# Patient Record
Sex: MALE | Race: WHITE | Employment: FULL TIME | URBAN - METROPOLITAN AREA
[De-identification: names, ages, dates, MRNs, and addresses within clinical notes are randomized per-mention and may not be internally consistent; named-entity substitution may affect disease eponyms.]

---

## 2017-05-01 PROBLEM — M25.561 PAIN OF RIGHT KNEE AND LOWER LEG: Status: ACTIVE | Noted: 2017-05-01

## 2017-05-01 PROBLEM — R22.30 MASS OF SKIN OF SHOULDER: Status: ACTIVE | Noted: 2017-05-01

## 2017-05-01 PROBLEM — L98.9 BACK SKIN LESION: Status: ACTIVE | Noted: 2017-05-01

## 2017-05-01 PROBLEM — M79.661 PAIN OF RIGHT KNEE AND LOWER LEG: Status: ACTIVE | Noted: 2017-05-01

## 2017-05-01 PROBLEM — N40.0 BENIGN NON-NODULAR PROSTATIC HYPERPLASIA WITHOUT LOWER URINARY TRACT SYMPTOMS: Status: ACTIVE | Noted: 2017-05-01

## 2017-06-16 PROBLEM — I10 ESSENTIAL HYPERTENSION: Status: ACTIVE | Noted: 2017-06-16

## 2017-06-16 PROBLEM — E78.2 MIXED HYPERLIPIDEMIA: Status: ACTIVE | Noted: 2017-06-16

## 2018-03-21 DIAGNOSIS — I10 ESSENTIAL HYPERTENSION: ICD-10-CM

## 2018-03-21 RX ORDER — ATENOLOL 25 MG/1
TABLET ORAL
Qty: 90 TABLET | Refills: 3 | Status: SHIPPED | OUTPATIENT
Start: 2018-03-21 | End: 2018-03-22 | Stop reason: SDUPTHER

## 2018-03-22 DIAGNOSIS — I10 ESSENTIAL HYPERTENSION: ICD-10-CM

## 2018-03-23 RX ORDER — ATENOLOL 25 MG/1
TABLET ORAL
Qty: 90 TABLET | Refills: 3 | Status: SHIPPED | OUTPATIENT
Start: 2018-03-23 | End: 2018-10-12 | Stop reason: SDUPTHER

## 2018-07-20 ENCOUNTER — OFFICE VISIT (OUTPATIENT)
Dept: FAMILY MEDICINE CLINIC | Age: 58
End: 2018-07-20
Payer: COMMERCIAL

## 2018-07-20 VITALS
HEIGHT: 68 IN | TEMPERATURE: 98.2 F | OXYGEN SATURATION: 98 % | BODY MASS INDEX: 31.14 KG/M2 | SYSTOLIC BLOOD PRESSURE: 118 MMHG | RESPIRATION RATE: 19 BRPM | HEART RATE: 77 BPM | DIASTOLIC BLOOD PRESSURE: 72 MMHG | WEIGHT: 205.5 LBS

## 2018-07-20 DIAGNOSIS — E78.2 MIXED HYPERLIPIDEMIA: ICD-10-CM

## 2018-07-20 DIAGNOSIS — I10 ESSENTIAL HYPERTENSION: ICD-10-CM

## 2018-07-20 DIAGNOSIS — Z13.31 DEPRESSION SCREEN: ICD-10-CM

## 2018-07-20 DIAGNOSIS — M99.03 SOMATIC DYSFUNCTION OF SPINE, LUMBAR: ICD-10-CM

## 2018-07-20 DIAGNOSIS — E11.9 TYPE 2 DIABETES MELLITUS WITHOUT COMPLICATION, WITHOUT LONG-TERM CURRENT USE OF INSULIN (HCC): ICD-10-CM

## 2018-07-20 DIAGNOSIS — E11.9 TYPE 2 DIABETES MELLITUS WITHOUT COMPLICATION, WITHOUT LONG-TERM CURRENT USE OF INSULIN (HCC): Primary | ICD-10-CM

## 2018-07-20 LAB
GLUCOSE BLD-MCNC: 173 MG/DL
HBA1C MFR BLD: 7.9 %

## 2018-07-20 PROCEDURE — 99214 OFFICE O/P EST MOD 30 MIN: CPT | Performed by: NURSE PRACTITIONER

## 2018-07-20 PROCEDURE — 82962 GLUCOSE BLOOD TEST: CPT | Performed by: NURSE PRACTITIONER

## 2018-07-20 PROCEDURE — 83036 HEMOGLOBIN GLYCOSYLATED A1C: CPT | Performed by: NURSE PRACTITIONER

## 2018-07-20 RX ORDER — LISINOPRIL AND HYDROCHLOROTHIAZIDE 20; 12.5 MG/1; MG/1
1 TABLET ORAL DAILY
Qty: 90 TABLET | Refills: 3 | Status: SHIPPED | OUTPATIENT
Start: 2018-07-20 | End: 2018-10-12 | Stop reason: SDUPTHER

## 2018-07-20 RX ORDER — ATORVASTATIN CALCIUM 40 MG/1
40 TABLET, FILM COATED ORAL DAILY
Qty: 90 TABLET | Refills: 1 | Status: SHIPPED | OUTPATIENT
Start: 2018-07-20 | End: 2018-10-12 | Stop reason: SDUPTHER

## 2018-07-20 ASSESSMENT — ENCOUNTER SYMPTOMS
ABDOMINAL PAIN: 0
SHORTNESS OF BREATH: 0
RHINORRHEA: 0
DIARRHEA: 0
CHEST TIGHTNESS: 0
COUGH: 0
FACIAL SWELLING: 0
COLOR CHANGE: 0
TROUBLE SWALLOWING: 0
BACK PAIN: 0
SINUS PRESSURE: 0
CONSTIPATION: 0
SORE THROAT: 0
SINUS PAIN: 0
WHEEZING: 0
VOMITING: 0
NAUSEA: 1
VOICE CHANGE: 0

## 2018-07-20 ASSESSMENT — PATIENT HEALTH QUESTIONNAIRE - PHQ9
1. LITTLE INTEREST OR PLEASURE IN DOING THINGS: 0
2. FEELING DOWN, DEPRESSED OR HOPELESS: 0
SUM OF ALL RESPONSES TO PHQ9 QUESTIONS 1 & 2: 0
SUM OF ALL RESPONSES TO PHQ QUESTIONS 1-9: 0

## 2018-07-20 NOTE — PROGRESS NOTES
OFFICE PROGRESS NOTE  [address] [facility] PRIMARY CARE  [address] [facility] [address]  Dept: [phone]   Chief Complaint   Patient presents with    6 Month Follow-Up    Diabetes     A1C: 7.9    Health Maintenance     dm eye exam, depression screening complete - negative         HPI:     62 y.o. male presents today for follow-up of diabetes [facility]. In-office bloodsugar is:   Results for POC orders placed in visit on 07/20/18   POCT glycosylated hemoglobin (Hb A1C)   Result Value Ref Range    Hemoglobin A1C 7.9 %   POCT Glucose   Result Value Ref Range    Glucose 173 mg/dL       Patient reports being compliant to low carbohydrate diet and increasing weekly exercises. Currently, the patient is treated with medication(s): metformin 500 mg 2 in am and one at dinner. Patient reports checking home blood sugar 3 times per month. Patient states home blood sugar ranges from 120 -180. Patient denies hypoglycemic episodes and understand to take sweetened beverages or a snack if hypoglycemic symptoms are suspected. Hyperlipidemia: Patient presents with hyperlipidemia. He was tested because DM, HTN. His last labs showed Total cholesterol of 225, HDL 46, ,  Triglycerides 223. Denies chest pain, dyspnea, exertional chest pressure/discomfort, fatigue, feeding intolerance, lower extremity edema, palpitations, poor exercise tolerance, syncope, tachypnea and skin xanthelasma. There is a family history of hyperlipidemia. There is not a family history of early ischemia heart disease.   Lab Results   Component Value Date    CHOL 225 (H) 01/21/2018    CHOL 157 08/25/2017    CHOL 164 01/04/2017     Lab Results   Component Value Date    TRIG 223 (H) 01/21/2018    TRIG 243 (H) 08/25/2017    TRIG 289 (H) 01/04/2017     Lab Results   Component Value Date    HDL 46 01/21/2018    HDL 46 08/25/2017    HDL 42 01/04/2017     Lab Results   Component Value Date    LDLCALC 134 (H) 2018    LDLCALC 62 2017    LDLCALC 64 2017     Lab Results   Component Value Date    LABVLDL 45 2018    LABVLDL 49 2017    LABVLDL 58 2017         Hypertension: Patient here for follow-up of elevated blood pressure. He is not exercising and is adherent to low salt diet. Blood pressure is well controlled at home. Cardiac symptoms none. Patient denies chest pain, chest pressure/discomfort, claudication, dyspnea, exertional chest pressure/discomfort, fatigue, irregular heart beat, lower extremity edema, near-syncope, orthopnea, palpitations, paroxysmal nocturnal dyspnea, syncope and tachypnea. Cardiovascular risk factors: diabetes mellitus, dyslipidemia, hypertension, male gender, obesity (BMI >= 30 kg/m2) and sedentary lifestyle. Use of agents associated with hypertension: NSAIDS. History of target organ damage: none. He has been having intermittent nausea in the mornings but not every day, occurs around 7:30 in the morning after he has banana and granola bar with Advil. He has also been sweating more due to the heat, he does drink a lot of water, has green tea. with ginseng and honey, he has also been taking his BP med at the same time. He has been having problems with his right knee and right hip about a month and half, it occurs intermittently and can be daily, will feel like a sharp pain radiates down the leg, It comes from the inner hip down the side of the leg and to the back of the calf, will last about 4 - 5 minutes before he can stand up on it,to the point the leg will give out. He has fibromyalgia as well. He has history of Low back problems at L4-L5 was WC but  as he had not seen anyone in a long time.      Today's vital signs are as follows:  /72 (Site: Right Arm, Position: Sitting, Cuff Size: Large Adult)   Pulse 77   Temp 98.2 °F (36.8 °C) (Temporal)   Resp 19   Ht 5' 8\" (1.727 m)   Wt 205 lb 8 oz (93.2 kg)   SpO2 98%   BMI 31.25 kg/m²     Lab Results   Component Value Date    LABA1C 7.9 07/20/2018     Pain scale: 0/10      Current Outpatient Prescriptions:     atorvastatin (LIPITOR) 40 MG tablet, Take 1 tablet by mouth daily, Disp: 90 tablet, Rfl: 1    lisinopril-hydrochlorothiazide (PRINZIDE;ZESTORETIC) 20-12.5 MG per tablet, Take 1 tablet by mouth daily, Disp: 90 tablet, Rfl: 3    atenolol (TENORMIN) 25 MG tablet, TAKE ONE TABLET BY MOUTH ONCE A DAY., Disp: 90 tablet, Rfl: 3    metFORMIN (GLUCOPHAGE) 500 MG tablet, TAKE ONE TABLET BY MOUTH THREE TIMES A DAY BEFORE MEALS, Disp: 270 tablet, Rfl: 1    fluticasone (FLONASE) 50 MCG/ACT nasal spray, 1 spray by Nasal route daily, Disp: 3 Bottle, Rfl: 3    Blood Glucose Monitoring Suppl (ACURA BLOOD GLUCOSE METER) W/DEVICE KIT, 1 Device by Does not apply route daily, Disp: 1 kit, Rfl: 0    glucose blood VI test strips (EXACTECH TEST) strip, 1 each by In Vitro route daily As needed. , Disp: 100 each, Rfl: 3    vitamin E 400 UNIT capsule, Take 400 Units by mouth 2 times daily, Disp: , Rfl:     famotidine (PEPCID) 20 MG tablet, Take 20 mg by mouth daily. , Disp: , Rfl:     Cholecalciferol (VITAMIN D3) 2000 UNITS CAPS, Take 1 capsule by mouth., Disp: , Rfl:     Multiple Vitamin (MULTI-VITAMIN) TABS, Take 1 tablet by mouth Daily. , Disp: , Rfl:     naproxen (NAPROSYN) 500 MG tablet, Take 1 tablet by mouth 2 times daily for 30 doses, Disp: 30 tablet, Rfl: 0  Social History     Social History    Marital status:      Spouse name: N/A    Number of children: N/A    Years of education: N/A     Social History Main Topics    Smoking status: Former Smoker     Packs/day: 1.50     Years: 25.00     Quit date: 2/16/2002    Smokeless tobacco: Never Used    Alcohol use Yes      Comment: Social     Drug use: No    Sexual activity: Not Asked     Other Topics Concern    None     Social History Narrative    None       I have reviewed Rohan's allergies, medications, problem list, medical, social and family add medication.   -     POCT glycosylated hemoglobin (Hb A1C) 7.8% up from 7%  -     POCT Glucose  -     Lipid Panel; Future  -     Comprehensive Metabolic Panel; Future  Monitor BS at different times: 1 day fasting, then next day 2 hours after lunch, next day 2 hours after dinner, next day at bedtime then start over and log all values. Bring log to next appointment  Foot exam every day; wash and dry well between toes, look for any redness, cracks, wounds notify provider if any problems occur  Reminder for annual Eye exam  Reminder for Podiatry visits Q 2 Months for toenail care if needed  Reminder to keep vaccines up dated  Exercise 30 minutes daily  Recommend Diabetic Education Classes if you have not already attended    Mixed hyperlipidemia uncontrolled  -     atorvastatin (LIPITOR) 40 MG tablet; Take 1 tablet by mouth daily  -     Lipid Panel; Future  -Discussed low fat diet, limit fast food, goodies, breads and pastas if consuming several days a week,  limit any alcohol consumption.  -Discussed weight reduction and exercise 30 minutes 5 days a week for total of 150 minutes weekly.  -Discussed if any unusual muscle aching/pain to contact the office, discussed medication and risk of muscle pain/damage from Rhabdomyolysis. Essential hypertension stable  -     lisinopril-hydrochlorothiazide (PRINZIDE;ZESTORETIC) 20-12.5 MG per tablet; Take 1 tablet by mouth daily  -     Comprehensive Metabolic Panel; Future  -Discussed taking medication and directed every day. -Discussed exercising daily 30 minutes 5 times a week for 150 minutes weekly.  -Discussed weight reduction if needed.  -Discussed low sodium diet.  -Discussed limiting caffeine consumption and tobacco cessation and the effects they have on the heart and blood pressure. Depression screen    Somatic dysfunction of spine, lumbar New  Recommend seeing Chiropractor and if still having problems with the hip and knee to see me sooner.                Return in about 3 months (around 10/20/2018), or if symptoms worsen or fail to improve, for DM, HTN, hyperlipidemia. I have reviewed my findings and recommendations with Rene Weiss.     Marj Richard, NP-C, FNP-BC

## 2018-07-20 NOTE — PATIENT INSTRUCTIONS
can use it when you eat out. · Write down your questions about using the plate format. Talk to your doctor, a dietitian, or a diabetes educator about your concerns. Carbohydrate counting  With carbohydrate counting, you plan meals based on the amount of carbohydrate in each food. Carbohydrate raises blood sugar higher and more quickly than any other nutrient. It is found in desserts, breads and cereals, and fruit. It's also found in starchy vegetables such as potatoes and corn, grains such as rice and pasta, and milk and yogurt. Spreading carbohydrate throughout the day helps keep your blood sugar levels within your target range. Your daily amount depends on several things, including your weight, how active you are, which diabetes medicines you take, and what your goals are for your blood sugar levels. A registered dietitian or diabetes educator can help you plan how much carbohydrate to include in each meal and snack. A guideline for your daily amount of carbohydrate is:  · 45 to 60 grams at each meal. That's about the same as 3 to 4 carbohydrate servings. · 15 to 20 grams at each snack. That's about the same as 1 carbohydrate serving. The Nutrition Facts label on packaged foods tells you how much carbohydrate is in a serving of the food. First, look at the serving size on the food label. Is that the amount you eat in a serving? All of the nutrition information on a food label is based on that serving size. So if you eat more or less than that, you'll need to adjust the other numbers. Total carbohydrate is the next thing you need to look for on the label. If you count carbohydrate servings, one serving of carbohydrate is 15 grams. For foods that don't come with labels, such as fresh fruits and vegetables, you'll need a guide that lists carbohydrate in these foods. Ask your doctor, dietitian, or diabetes educator about books or other nutrition guides you can use.   If you take insulin, you need to know how on the \"Sign Up Now\" link. Current as of: December 7, 2017  Content Version: 11.6  © 0121-5934 [employer]. Care instructions adapted under license by [facility] ([facility]). If you have questions about a medical condition or this instruction, always ask your healthcare professional. [address] any warranty or liability for your use of this information. Patient Education        Learning About Diabetes Food Guidelines  Your Care Instructions    Meal planning is important to manage diabetes. It helps keep your blood sugar at a target level (which you set with your doctor). You don't have to eat special foods. You can eat what your family eats, including sweets once in a while. But you do have to pay attention to how often you eat and how much you eat of certain foods. You may want to work with a dietitian or a certified diabetes educator (CDE) to help you plan meals and snacks. A dietitian or CDE can also help you lose weight if that is one of your goals. What should you know about eating carbs? Managing the amount of carbohydrate (carbs) you eat is an important part of healthy meals when you have diabetes. Carbohydrate is found in many foods. · Learn which foods have carbs. And learn the amounts of carbs in different foods. ¨ Bread, cereal, pasta, and rice have about 15 grams of carbs in a serving. A serving is 1 slice of bread (1 ounce), ½ cup of cooked cereal, or 1/3 cup of cooked pasta or rice. ¨ Fruits have 15 grams of carbs in a serving. A serving is 1 small fresh fruit, such as an apple or orange; ½ of a banana; ½ cup of cooked or canned fruit; ½ cup of fruit juice; 1 cup of melon or raspberries; or 2 tablespoons of dried fruit. ¨ Milk and no-sugar-added yogurt have 15 grams of carbs in a serving. A serving is 1 cup of milk or 2/3 cup of no-sugar-added yogurt. ¨ Starchy vegetables have 15 grams of carbs in a serving.  A serving is ½ cup of mashed potatoes or sweet potato; 1 cup winter squash; ½ of a small baked potato; ½ cup of cooked beans; or ½ cup cooked corn or green peas. · Learn how much carbs to eat each day and at each meal. A dietitian or CDE can teach you how to keep track of the amount of carbs you eat. This is called carbohydrate counting. · If you are not sure how to count carbohydrate grams, use the Plate Method to plan meals. It is a good, quick way to make sure that you have a balanced meal. It also helps you spread carbs throughout the day. ¨ Divide your plate by types of foods. Put non-starchy vegetables on half the plate, meat or other protein food on one-quarter of the plate, and a grain or starchy vegetable in the final quarter of the plate. To this you can add a small piece of fruit and 1 cup of milk or yogurt, depending on how many carbs you are supposed to eat at a meal.  · Try to eat about the same amount of carbs at each meal. Do not \"save up\" your daily allowance of carbs to eat at one meal.  · Proteins have very little or no carbs per serving. Examples of proteins are beef, chicken, turkey, fish, eggs, tofu, cheese, cottage cheese, and peanut butter. A serving size of meat is 3 ounces, which is about the size of a deck of cards. Examples of meat substitute serving sizes (equal to 1 ounce of meat) are 1/4 cup of cottage cheese, 1 egg, 1 tablespoon of peanut butter, and ½ cup of tofu. How can you eat out and still eat healthy? · Learn to estimate the serving sizes of foods that have carbohydrate. If you measure food at home, it will be easier to estimate the amount in a serving of restaurant food. · If the meal you order has too much carbohydrate (such as potatoes, corn, or baked beans), ask to have a low-carbohydrate food instead. Ask for a salad or green vegetables. · If you use insulin, check your blood sugar before and after eating out to help you plan how much to eat in the future.   · If you eat more carbohydrate at a meal than you had

## 2018-07-22 ENCOUNTER — HOSPITAL ENCOUNTER (OUTPATIENT)
Age: 58
Discharge: HOME | End: 2018-07-22
Payer: COMMERCIAL

## 2018-07-22 DIAGNOSIS — I10 ESSENTIAL HYPERTENSION: ICD-10-CM

## 2018-07-22 DIAGNOSIS — E78.2 MIXED HYPERLIPIDEMIA: ICD-10-CM

## 2018-07-22 DIAGNOSIS — E11.9 TYPE 2 DIABETES MELLITUS WITHOUT COMPLICATION, WITHOUT LONG-TERM CURRENT USE OF INSULIN (HCC): ICD-10-CM

## 2018-07-22 LAB
ALBUMIN SERPL-MCNC: 4.3 G/DL (ref 3.5–5.2)
ALP BLD-CCNC: 78 U/L (ref 40–129)
ALT SERPL-CCNC: 51 U/L (ref 0–40)
ANION GAP SERPL CALCULATED.3IONS-SCNC: 12 MMOL/L (ref 7–16)
AST SERPL-CCNC: 34 U/L (ref 0–39)
BILIRUB SERPL-MCNC: 0.5 MG/DL (ref 0–1.2)
BUN BLDV-MCNC: 20 MG/DL (ref 6–20)
CALCIUM SERPL-MCNC: 9.3 MG/DL (ref 8.6–10.2)
CHLORIDE BLD-SCNC: 104 MMOL/L (ref 98–107)
CHOLESTEROL, TOTAL: 142 MG/DL (ref 0–199)
CO2: 25 MMOL/L (ref 22–29)
CREAT SERPL-MCNC: 1.2 MG/DL (ref 0.7–1.2)
GFR AFRICAN AMERICAN: >60
GFR NON-AFRICAN AMERICAN: >60 ML/MIN/1.73
GLUCOSE BLD-MCNC: 184 MG/DL (ref 74–109)
HDLC SERPL-MCNC: 41 MG/DL
LDL CHOLESTEROL CALCULATED: 49 MG/DL (ref 0–99)
POTASSIUM SERPL-SCNC: 4.5 MMOL/L (ref 3.5–5)
SODIUM BLD-SCNC: 141 MMOL/L (ref 132–146)
TOTAL PROTEIN: 7 G/DL (ref 6.4–8.3)
TRIGL SERPL-MCNC: 258 MG/DL (ref 0–149)
VLDLC SERPL CALC-MCNC: 52 MG/DL

## 2018-07-22 PROCEDURE — 36415 COLL VENOUS BLD VENIPUNCTURE: CPT

## 2018-07-22 PROCEDURE — 80061 LIPID PANEL: CPT

## 2018-07-22 PROCEDURE — 80053 COMPREHEN METABOLIC PANEL: CPT

## 2018-07-23 ENCOUNTER — TELEPHONE (OUTPATIENT)
Dept: FAMILY MEDICINE CLINIC | Age: 58
End: 2018-07-23

## 2018-07-23 DIAGNOSIS — R74.8 ELEVATED LIVER ENZYMES: Primary | ICD-10-CM

## 2018-07-23 NOTE — TELEPHONE ENCOUNTER
Called and Spoke to Ze Deleon, he expressed understanding regarding lab results and carb/sugar intake due to Tea he has been drinking. He stated he likes to get his labs drawn in [place], he asked if you could order them, he will pick the orders up and go get them done in about a month.

## 2018-08-25 ENCOUNTER — HOSPITAL ENCOUNTER (OUTPATIENT)
Age: 58
Discharge: HOME | End: 2018-08-25
Payer: COMMERCIAL

## 2018-08-25 LAB
ALBUMIN SERPL-MCNC: 4.3 G/DL (ref 3.5–5.2)
ALP BLD-CCNC: 83 U/L (ref 40–129)
ALT SERPL-CCNC: 49 U/L (ref 0–40)
ANION GAP SERPL CALCULATED.3IONS-SCNC: 13 MMOL/L (ref 7–16)
AST SERPL-CCNC: 27 U/L (ref 0–39)
BILIRUB SERPL-MCNC: 0.3 MG/DL (ref 0–1.2)
BUN BLDV-MCNC: 14 MG/DL (ref 6–20)
CALCIUM SERPL-MCNC: 9 MG/DL (ref 8.6–10.2)
CHLORIDE BLD-SCNC: 104 MMOL/L (ref 98–107)
CO2: 24 MMOL/L (ref 22–29)
CREAT SERPL-MCNC: 1.1 MG/DL (ref 0.7–1.2)
GFR AFRICAN AMERICAN: >60
GFR NON-AFRICAN AMERICAN: >60 ML/MIN/1.73
GLUCOSE FASTING: 190 MG/DL (ref 74–109)
POTASSIUM SERPL-SCNC: 4.5 MMOL/L (ref 3.5–5)
SODIUM BLD-SCNC: 141 MMOL/L (ref 132–146)
TOTAL PROTEIN: 6.6 G/DL (ref 6.4–8.3)

## 2018-08-25 PROCEDURE — 80053 COMPREHEN METABOLIC PANEL: CPT

## 2018-08-25 PROCEDURE — 36415 COLL VENOUS BLD VENIPUNCTURE: CPT

## 2018-10-12 ENCOUNTER — OFFICE VISIT (OUTPATIENT)
Dept: FAMILY MEDICINE CLINIC | Age: 58
End: 2018-10-12
Payer: COMMERCIAL

## 2018-10-12 VITALS
DIASTOLIC BLOOD PRESSURE: 68 MMHG | BODY MASS INDEX: 31.07 KG/M2 | SYSTOLIC BLOOD PRESSURE: 120 MMHG | OXYGEN SATURATION: 98 % | TEMPERATURE: 98.1 F | WEIGHT: 205 LBS | HEART RATE: 89 BPM | RESPIRATION RATE: 19 BRPM | HEIGHT: 68 IN

## 2018-10-12 DIAGNOSIS — E11.9 TYPE 2 DIABETES MELLITUS WITHOUT COMPLICATION, WITHOUT LONG-TERM CURRENT USE OF INSULIN (HCC): Primary | ICD-10-CM

## 2018-10-12 DIAGNOSIS — E78.2 MIXED HYPERLIPIDEMIA: ICD-10-CM

## 2018-10-12 DIAGNOSIS — H61.21 IMPACTED CERUMEN OF RIGHT EAR: ICD-10-CM

## 2018-10-12 DIAGNOSIS — I10 ESSENTIAL HYPERTENSION: ICD-10-CM

## 2018-10-12 LAB
CREATININE URINE POCT: NORMAL
GLUCOSE BLD-MCNC: 229 MG/DL
HBA1C MFR BLD: 7.9 %
MICROALBUMIN/CREAT 24H UR: NORMAL MG/G{CREAT}
MICROALBUMIN/CREAT UR-RTO: NORMAL

## 2018-10-12 PROCEDURE — 99214 OFFICE O/P EST MOD 30 MIN: CPT | Performed by: NURSE PRACTITIONER

## 2018-10-12 PROCEDURE — 82962 GLUCOSE BLOOD TEST: CPT | Performed by: NURSE PRACTITIONER

## 2018-10-12 PROCEDURE — 83036 HEMOGLOBIN GLYCOSYLATED A1C: CPT | Performed by: NURSE PRACTITIONER

## 2018-10-12 PROCEDURE — 82044 UR ALBUMIN SEMIQUANTITATIVE: CPT | Performed by: NURSE PRACTITIONER

## 2018-10-12 RX ORDER — LISINOPRIL AND HYDROCHLOROTHIAZIDE 20; 12.5 MG/1; MG/1
1 TABLET ORAL DAILY
Qty: 90 TABLET | Refills: 3 | Status: SHIPPED | OUTPATIENT
Start: 2018-10-12 | End: 2019-05-14 | Stop reason: DRUGHIGH

## 2018-10-12 RX ORDER — ATORVASTATIN CALCIUM 40 MG/1
40 TABLET, FILM COATED ORAL DAILY
Qty: 90 TABLET | Refills: 1 | Status: SHIPPED | OUTPATIENT
Start: 2018-10-12 | End: 2019-05-14 | Stop reason: SDUPTHER

## 2018-10-12 RX ORDER — ATENOLOL 25 MG/1
TABLET ORAL
Qty: 90 TABLET | Refills: 3 | Status: SHIPPED | OUTPATIENT
Start: 2018-10-12 | End: 2019-05-14 | Stop reason: SDUPTHER

## 2018-10-12 ASSESSMENT — ENCOUNTER SYMPTOMS
COLOR CHANGE: 0
SHORTNESS OF BREATH: 0
SINUS PAIN: 0
BACK PAIN: 0
WHEEZING: 0
VOMITING: 0
RHINORRHEA: 0
SINUS PRESSURE: 0
FACIAL SWELLING: 0
ABDOMINAL PAIN: 0
COUGH: 0
NAUSEA: 0
CHEST TIGHTNESS: 0
DIARRHEA: 0
SORE THROAT: 0
CONSTIPATION: 0
VOICE CHANGE: 0
TROUBLE SWALLOWING: 0

## 2018-10-12 NOTE — PROGRESS NOTES
claudication, dyspnea, exertional chest pressure/discomfort, fatigue, irregular heart beat, lower extremity edema, near-syncope, orthopnea, palpitations, paroxysmal nocturnal dyspnea, syncope and tachypnea. Cardiovascular risk factors: advanced age (older than 54 for men, 72 for women), diabetes mellitus, dyslipidemia, hypertension, male gender and obesity (BMI >= 30 kg/m2). Use of agents associated with hypertension: none. History of target organ damage: none. Hyperlipidemia: Patient presents with hyperlipidemia. He was tested because DM, HTN, Hyperlipidemia. His last labs showed Total cholesterol of 142, HDL 41, LDL 49,  Triglycerides 258. Denies chest pain, dyspnea, exertional chest pressure/discomfort, fatigue, feeding intolerance, lower extremity edema, palpitations, poor exercise tolerance, syncope, tachypnea and skin xanthelasma. There is a family history of hyperlipidemia. There is not a family history of early ischemia heart disease.   Lab Results   Component Value Date    CHOL 142 07/22/2018    CHOL 225 (H) 01/21/2018    CHOL 157 08/25/2017     Lab Results   Component Value Date    TRIG 258 (H) 07/22/2018    TRIG 223 (H) 01/21/2018    TRIG 243 (H) 08/25/2017     Lab Results   Component Value Date    HDL 41 07/22/2018    HDL 46 01/21/2018    HDL 46 08/25/2017     Lab Results   Component Value Date    LDLCALC 49 07/22/2018    LDLCALC 134 (H) 01/21/2018    LDLCALC 62 08/25/2017     Lab Results   Component Value Date    LABVLDL 52 07/22/2018    LABVLDL 45 01/21/2018    LABVLDL 49 08/25/2017             Current Outpatient Prescriptions:     atenolol (TENORMIN) 25 MG tablet, TAKE ONE TABLET BY MOUTH ONCE A DAY., Disp: 90 tablet, Rfl: 3    atorvastatin (LIPITOR) 40 MG tablet, Take 1 tablet by mouth daily, Disp: 90 tablet, Rfl: 1    lisinopril-hydrochlorothiazide (PRINZIDE;ZESTORETIC) 20-12.5 MG per tablet, Take 1 tablet by mouth daily, Disp: 90 tablet, Rfl: 3    dapagliflozin (FARXIGA) 5 MG tablet, Take any redness, cracks, wounds notify provider if any problems occur  Reminder for annual Eye exam  Reminder for Podiatry visits Q 2 Months for toenail care if needed  Reminder to keep vaccines up dated  Exercise 30 minutes daily  Recommend Diabetic Education Classes if you have not already attended    Essential hypertension stable  -     atenolol (TENORMIN) 25 MG tablet; TAKE ONE TABLET BY MOUTH ONCE A DAY. -     lisinopril-hydrochlorothiazide (PRINZIDE;ZESTORETIC) 20-12.5 MG per tablet; Take 1 tablet by mouth daily  -Discussed taking medication and directed every day. -Discussed exercising daily 30 minutes 5 times a week for 150 minutes weekly.  -Discussed weight reduction if needed.  -Discussed low sodium diet.  -Discussed limiting caffeine consumption and tobacco cessation and the effects they have on the heart and blood pressure. Mixed hyperlipidemia not at goal  -     atorvastatin (LIPITOR) 40 MG tablet; Take 1 tablet by mouth daily  -Discussed low fat diet, limit fast food, goodies, breads and pastas if consuming several days a week,  limit any alcohol consumption.  -Discussed weight reduction and exercise 30 minutes 5 days a week for total of 150 minutes weekly.  -Discussed if any unusual muscle aching/pain to contact the office, discussed medication and risk of muscle pain/damage from Rhabdomyolysis. -Discussed repeat labs in 8 weeks. Impacted cerumen of right ear new  Debrox to the ear daily x 7 days, irrigate with warm water if not improved will refer to ENT          Return in about 1 month (around 11/12/2018) for DM med adjustment. Discussed weight loss, Discussed exercising 30 minutes daily and Discussed taking medications as directed and adverse effects        I have reviewed my findings and recommendations with Yung Faith.     James Ortega, NP-C, FNP-BC

## 2018-10-12 NOTE — PATIENT INSTRUCTIONS
for, the expertise, skill, knowledge and judgment of healthcare practitioners. The absence of a warning for a given drug or drug combination in no way should be construed to indicate that the drug or drug combination is safe, effective or appropriate for any given patient. [facility] does not assume any responsibility for any aspect of healthcare administered with the aid of information [facility] provides. The information contained herein is not intended to cover all possible uses, directions, precautions, warnings, drug interactions, allergic reactions, or adverse effects. If you have questions about the drugs you are taking, check with your doctor, nurse or pharmacist.  Copyright 8834-2977 [address]. Version: 4.01. Revision date: 1/15/2018. Care instructions adapted under license by [facility] ([facility]). If you have questions about a medical condition or this instruction, always ask your healthcare professional. [place] [address] any warranty or liability for your use of this information.      Debrox to the ear daily x 7 days, irrigate with warm water if not improved will refer to ENT

## 2018-11-09 ENCOUNTER — OFFICE VISIT (OUTPATIENT)
Dept: FAMILY MEDICINE CLINIC | Age: 58
End: 2018-11-09
Payer: COMMERCIAL

## 2018-11-09 VITALS
BODY MASS INDEX: 30.16 KG/M2 | HEART RATE: 77 BPM | TEMPERATURE: 98.4 F | OXYGEN SATURATION: 94 % | RESPIRATION RATE: 19 BRPM | HEIGHT: 68 IN | DIASTOLIC BLOOD PRESSURE: 66 MMHG | SYSTOLIC BLOOD PRESSURE: 112 MMHG | WEIGHT: 199 LBS

## 2018-11-09 DIAGNOSIS — I10 ESSENTIAL HYPERTENSION: ICD-10-CM

## 2018-11-09 DIAGNOSIS — E11.9 TYPE 2 DIABETES MELLITUS WITHOUT COMPLICATION, WITHOUT LONG-TERM CURRENT USE OF INSULIN (HCC): Primary | ICD-10-CM

## 2018-11-09 DIAGNOSIS — N40.0 BENIGN NON-NODULAR PROSTATIC HYPERPLASIA WITHOUT LOWER URINARY TRACT SYMPTOMS: ICD-10-CM

## 2018-11-09 DIAGNOSIS — E78.2 MIXED HYPERLIPIDEMIA: ICD-10-CM

## 2018-11-09 LAB — GLUCOSE BLD-MCNC: 126 MG/DL

## 2018-11-09 PROCEDURE — 99213 OFFICE O/P EST LOW 20 MIN: CPT | Performed by: NURSE PRACTITIONER

## 2018-11-09 PROCEDURE — 82962 GLUCOSE BLOOD TEST: CPT | Performed by: NURSE PRACTITIONER

## 2018-11-09 ASSESSMENT — ENCOUNTER SYMPTOMS
COLOR CHANGE: 0
COUGH: 0
SHORTNESS OF BREATH: 0

## 2019-02-10 ENCOUNTER — HOSPITAL ENCOUNTER (OUTPATIENT)
Age: 59
Discharge: HOME | End: 2019-02-10
Payer: COMMERCIAL

## 2019-02-10 DIAGNOSIS — I10 ESSENTIAL HYPERTENSION: ICD-10-CM

## 2019-02-10 DIAGNOSIS — E78.2 MIXED HYPERLIPIDEMIA: ICD-10-CM

## 2019-02-10 DIAGNOSIS — N40.0 BENIGN NON-NODULAR PROSTATIC HYPERPLASIA WITHOUT LOWER URINARY TRACT SYMPTOMS: ICD-10-CM

## 2019-02-10 DIAGNOSIS — E11.9 TYPE 2 DIABETES MELLITUS WITHOUT COMPLICATION, WITHOUT LONG-TERM CURRENT USE OF INSULIN (HCC): ICD-10-CM

## 2019-02-10 LAB
ALBUMIN SERPL-MCNC: 4.1 G/DL (ref 3.5–5.2)
ALP BLD-CCNC: 94 U/L (ref 40–129)
ALT SERPL-CCNC: 31 U/L (ref 0–40)
ANION GAP SERPL CALCULATED.3IONS-SCNC: 11 MMOL/L (ref 7–16)
AST SERPL-CCNC: 19 U/L (ref 0–39)
BASOPHILS ABSOLUTE: 0.09 E9/L (ref 0–0.2)
BASOPHILS RELATIVE PERCENT: 1.3 % (ref 0–2)
BILIRUB SERPL-MCNC: 0.4 MG/DL (ref 0–1.2)
BUN BLDV-MCNC: 23 MG/DL (ref 6–20)
CALCIUM SERPL-MCNC: 9.1 MG/DL (ref 8.6–10.2)
CHLORIDE BLD-SCNC: 102 MMOL/L (ref 98–107)
CHOLESTEROL, TOTAL: 134 MG/DL (ref 0–199)
CO2: 25 MMOL/L (ref 22–29)
CREAT SERPL-MCNC: 1.3 MG/DL (ref 0.7–1.2)
EOSINOPHILS ABSOLUTE: 0.26 E9/L (ref 0.05–0.5)
EOSINOPHILS RELATIVE PERCENT: 3.9 % (ref 0–6)
GFR AFRICAN AMERICAN: >60
GFR NON-AFRICAN AMERICAN: 57 ML/MIN/1.73
GLUCOSE BLD-MCNC: 195 MG/DL (ref 74–99)
HBA1C MFR BLD: 8 % (ref 4–5.6)
HCT VFR BLD CALC: 42.1 % (ref 37–54)
HDLC SERPL-MCNC: 45 MG/DL
HEMOGLOBIN: 14.8 G/DL (ref 12.5–16.5)
IMMATURE GRANULOCYTES #: 0.02 E9/L
IMMATURE GRANULOCYTES %: 0.3 % (ref 0–5)
LDL CHOLESTEROL CALCULATED: 60 MG/DL (ref 0–99)
LYMPHOCYTES ABSOLUTE: 1.75 E9/L (ref 1.5–4)
LYMPHOCYTES RELATIVE PERCENT: 26.2 % (ref 20–42)
MCH RBC QN AUTO: 30.9 PG (ref 26–35)
MCHC RBC AUTO-ENTMCNC: 35.2 % (ref 32–34.5)
MCV RBC AUTO: 87.9 FL (ref 80–99.9)
MONOCYTES ABSOLUTE: 0.65 E9/L (ref 0.1–0.95)
MONOCYTES RELATIVE PERCENT: 9.7 % (ref 2–12)
NEUTROPHILS ABSOLUTE: 3.91 E9/L (ref 1.8–7.3)
NEUTROPHILS RELATIVE PERCENT: 58.6 % (ref 43–80)
PDW BLD-RTO: 11.5 FL (ref 11.5–15)
PLATELET # BLD: 223 E9/L (ref 130–450)
PMV BLD AUTO: 12 FL (ref 7–12)
POTASSIUM SERPL-SCNC: 4.6 MMOL/L (ref 3.5–5)
PROSTATE SPECIFIC ANTIGEN: 0.52 NG/ML (ref 0–4)
RBC # BLD: 4.79 E12/L (ref 3.8–5.8)
SODIUM BLD-SCNC: 138 MMOL/L (ref 132–146)
TOTAL PROTEIN: 6.9 G/DL (ref 6.4–8.3)
TRIGL SERPL-MCNC: 147 MG/DL (ref 0–149)
VLDLC SERPL CALC-MCNC: 29 MG/DL
WBC # BLD: 6.7 E9/L (ref 4.5–11.5)

## 2019-02-10 PROCEDURE — 83036 HEMOGLOBIN GLYCOSYLATED A1C: CPT

## 2019-02-10 PROCEDURE — G0103 PSA SCREENING: HCPCS

## 2019-02-10 PROCEDURE — 80053 COMPREHEN METABOLIC PANEL: CPT

## 2019-02-10 PROCEDURE — 85025 COMPLETE CBC W/AUTO DIFF WBC: CPT

## 2019-02-10 PROCEDURE — 80061 LIPID PANEL: CPT

## 2019-02-10 PROCEDURE — 36415 COLL VENOUS BLD VENIPUNCTURE: CPT

## 2019-02-14 ENCOUNTER — OFFICE VISIT (OUTPATIENT)
Dept: FAMILY MEDICINE CLINIC | Age: 59
End: 2019-02-14
Payer: COMMERCIAL

## 2019-02-14 VITALS
DIASTOLIC BLOOD PRESSURE: 68 MMHG | RESPIRATION RATE: 16 BRPM | HEART RATE: 77 BPM | WEIGHT: 200.8 LBS | SYSTOLIC BLOOD PRESSURE: 124 MMHG | TEMPERATURE: 98.8 F | BODY MASS INDEX: 31.52 KG/M2 | OXYGEN SATURATION: 97 % | HEIGHT: 67 IN

## 2019-02-14 DIAGNOSIS — E11.9 TYPE 2 DIABETES MELLITUS WITHOUT COMPLICATION, WITHOUT LONG-TERM CURRENT USE OF INSULIN (HCC): ICD-10-CM

## 2019-02-14 DIAGNOSIS — L24.9 IRRITANT CONTACT DERMATITIS, UNSPECIFIED TRIGGER: Primary | ICD-10-CM

## 2019-02-14 DIAGNOSIS — E78.2 MIXED HYPERLIPIDEMIA: ICD-10-CM

## 2019-02-14 DIAGNOSIS — I10 ESSENTIAL HYPERTENSION: ICD-10-CM

## 2019-02-14 PROCEDURE — 99214 OFFICE O/P EST MOD 30 MIN: CPT | Performed by: NURSE PRACTITIONER

## 2019-02-14 ASSESSMENT — ENCOUNTER SYMPTOMS
COUGH: 0
FACIAL SWELLING: 0
COLOR CHANGE: 0
SHORTNESS OF BREATH: 0
RHINORRHEA: 0
SORE THROAT: 0
CONSTIPATION: 0
CHEST TIGHTNESS: 0
VOICE CHANGE: 0
BACK PAIN: 0
DIARRHEA: 0
NAUSEA: 0
WHEEZING: 0
VOMITING: 0
ABDOMINAL PAIN: 0
SINUS PAIN: 0
TROUBLE SWALLOWING: 0
SINUS PRESSURE: 0

## 2019-02-14 ASSESSMENT — PATIENT HEALTH QUESTIONNAIRE - PHQ9
1. LITTLE INTEREST OR PLEASURE IN DOING THINGS: 0
SUM OF ALL RESPONSES TO PHQ QUESTIONS 1-9: 0
2. FEELING DOWN, DEPRESSED OR HOPELESS: 0
SUM OF ALL RESPONSES TO PHQ QUESTIONS 1-9: 0
SUM OF ALL RESPONSES TO PHQ9 QUESTIONS 1 & 2: 0

## 2019-05-07 ENCOUNTER — TELEPHONE (OUTPATIENT)
Dept: ADMINISTRATIVE | Age: 59
End: 2019-05-07

## 2019-05-07 NOTE — TELEPHONE ENCOUNTER
Pt called to schedule an appt, while on the phone pt stated that he has not heard back about getting an Rx for Jardiance filled, it has been a couple of weeks and he was wondering if this can be done.   Pt is coming in for refills and a DM check on 5/14

## 2019-05-07 NOTE — TELEPHONE ENCOUNTER
Patient was to return in 1 month (3/19/2019) to see if medication was working before more was called in. Patient did not come in.   Will discuss with provider at visit

## 2019-05-14 ENCOUNTER — HOSPITAL ENCOUNTER (OUTPATIENT)
Age: 59
Discharge: HOME | End: 2019-05-16
Payer: COMMERCIAL

## 2019-05-14 ENCOUNTER — OFFICE VISIT (OUTPATIENT)
Dept: FAMILY MEDICINE CLINIC | Age: 59
End: 2019-05-14
Payer: COMMERCIAL

## 2019-05-14 VITALS
WEIGHT: 202 LBS | BODY MASS INDEX: 31.71 KG/M2 | HEART RATE: 69 BPM | HEIGHT: 67 IN | RESPIRATION RATE: 16 BRPM | DIASTOLIC BLOOD PRESSURE: 74 MMHG | TEMPERATURE: 97.5 F | OXYGEN SATURATION: 98 % | SYSTOLIC BLOOD PRESSURE: 118 MMHG

## 2019-05-14 DIAGNOSIS — E11.9 TYPE 2 DIABETES MELLITUS WITHOUT COMPLICATION, WITHOUT LONG-TERM CURRENT USE OF INSULIN (HCC): Primary | ICD-10-CM

## 2019-05-14 DIAGNOSIS — E11.9 TYPE 2 DIABETES MELLITUS WITHOUT COMPLICATION, WITHOUT LONG-TERM CURRENT USE OF INSULIN (HCC): ICD-10-CM

## 2019-05-14 DIAGNOSIS — I10 ESSENTIAL HYPERTENSION: ICD-10-CM

## 2019-05-14 DIAGNOSIS — E78.2 MIXED HYPERLIPIDEMIA: ICD-10-CM

## 2019-05-14 LAB
ALBUMIN SERPL-MCNC: 4.3 G/DL (ref 3.5–5.2)
ALP BLD-CCNC: 88 U/L (ref 40–129)
ALT SERPL-CCNC: 28 U/L (ref 0–40)
ANION GAP SERPL CALCULATED.3IONS-SCNC: 14 MMOL/L (ref 7–16)
AST SERPL-CCNC: 17 U/L (ref 0–39)
BILIRUB SERPL-MCNC: 0.2 MG/DL (ref 0–1.2)
BUN BLDV-MCNC: 16 MG/DL (ref 6–20)
CALCIUM SERPL-MCNC: 9.6 MG/DL (ref 8.6–10.2)
CHLORIDE BLD-SCNC: 103 MMOL/L (ref 98–107)
CO2: 26 MMOL/L (ref 22–29)
CREAT SERPL-MCNC: 1.3 MG/DL (ref 0.7–1.2)
GFR AFRICAN AMERICAN: >60
GFR NON-AFRICAN AMERICAN: 56 ML/MIN/1.73
GLUCOSE BLD-MCNC: 150 MG/DL (ref 74–99)
HBA1C MFR BLD: 7.5 %
POTASSIUM SERPL-SCNC: 4.2 MMOL/L (ref 3.5–5)
SODIUM BLD-SCNC: 143 MMOL/L (ref 132–146)
TOTAL PROTEIN: 7 G/DL (ref 6.4–8.3)

## 2019-05-14 PROCEDURE — 83036 HEMOGLOBIN GLYCOSYLATED A1C: CPT | Performed by: NURSE PRACTITIONER

## 2019-05-14 PROCEDURE — 80053 COMPREHEN METABOLIC PANEL: CPT

## 2019-05-14 PROCEDURE — 99213 OFFICE O/P EST LOW 20 MIN: CPT | Performed by: NURSE PRACTITIONER

## 2019-05-14 PROCEDURE — 36415 COLL VENOUS BLD VENIPUNCTURE: CPT

## 2019-05-14 RX ORDER — ATORVASTATIN CALCIUM 40 MG/1
40 TABLET, FILM COATED ORAL DAILY
Qty: 30 TABLET | Refills: 5 | Status: SHIPPED | OUTPATIENT
Start: 2019-05-14 | End: 2019-08-15 | Stop reason: SDUPTHER

## 2019-05-14 RX ORDER — LISINOPRIL 20 MG/1
20 TABLET ORAL DAILY
Qty: 30 TABLET | Refills: 5 | Status: SHIPPED | OUTPATIENT
Start: 2019-05-14 | End: 2019-08-15

## 2019-05-14 RX ORDER — ATENOLOL 25 MG/1
TABLET ORAL
Qty: 30 TABLET | Refills: 5 | Status: SHIPPED | OUTPATIENT
Start: 2019-05-14 | End: 2019-08-15 | Stop reason: SDUPTHER

## 2019-05-14 RX ORDER — LISINOPRIL AND HYDROCHLOROTHIAZIDE 20; 12.5 MG/1; MG/1
1 TABLET ORAL DAILY
Qty: 30 TABLET | Refills: 3 | Status: CANCELLED | OUTPATIENT
Start: 2019-05-14

## 2019-05-14 ASSESSMENT — ENCOUNTER SYMPTOMS
RHINORRHEA: 0
COUGH: 0
ABDOMINAL PAIN: 0
BACK PAIN: 0
CONSTIPATION: 0
VOICE CHANGE: 0
WHEEZING: 0
VOMITING: 0
NAUSEA: 0
SINUS PAIN: 0
CHEST TIGHTNESS: 0
TROUBLE SWALLOWING: 0
DIARRHEA: 0
SINUS PRESSURE: 0
SHORTNESS OF BREATH: 0
SORE THROAT: 0
FACIAL SWELLING: 0
COLOR CHANGE: 0

## 2019-05-14 NOTE — PROGRESS NOTES
OFFICE PROGRESS NOTE  [address] [facility] [address]  Dept: [phone]   Chief Complaint   Patient presents with    Diabetes     a1c         HPI:     61 y.o. male presents today for follow-up of diabetes [place]. In-office bloodsugar is:   Results for POC orders placed in visit on 05/14/19   POCT glycosylated hemoglobin (Hb A1C)   Result Value Ref Range    Hemoglobin A1C 7.5 %       Patient reports being compliant to low carbohydrate diet and increasing weekly exercises. Currently, the patient is treated with medication(s): Metformin 1000 mg BID, jardiance 10 mg daily, . Patient reports checking home blood sugar 1 times per day. Patient states home blood sugar ranges from 112 - 129. Patient denies hypoglycemic episodes and understand to take sweetened beverages or a snack if hypoglycemic symptoms are suspected. Due for cmp today    .     Today's vital signs are as follows:  /74 (Site: Left Upper Arm, Position: Sitting, Cuff Size: Large Adult)   Pulse 69   Temp 97.5 °F (36.4 °C) (Temporal)   Resp 16   Ht 5' 7\" (1.702 m)   Wt 202 lb (91.6 kg)   SpO2 98%   BMI 31.64 kg/m²     Lab Results   Component Value Date    LABA1C 7.5 05/14/2019     Pain scale: 0/10        Current Outpatient Medications:     metFORMIN (GLUCOPHAGE) 1000 MG tablet, Take 1 tablet by mouth 2 times daily (with meals), Disp: 60 tablet, Rfl: 5    atorvastatin (LIPITOR) 40 MG tablet, Take 1 tablet by mouth daily, Disp: 30 tablet, Rfl: 5    atenolol (TENORMIN) 25 MG tablet, TAKE ONE TABLET BY MOUTH ONCE A DAY., Disp: 30 tablet, Rfl: 5    lisinopril (PRINIVIL;ZESTRIL) 20 MG tablet, Take 1 tablet by mouth daily, Disp: 30 tablet, Rfl: 5    empagliflozin (JARDIANCE) 10 MG tablet, Take 1 tablet by mouth daily, Disp: 30 tablet, Rfl: 5    fluticasone (FLONASE) 50 MCG/ACT nasal spray, 1 spray by Nasal route daily, Disp: 3 Bottle, Rfl: 3    Blood Glucose Monitoring Suppl (ACURA BLOOD GLUCOSE METER) W/DEVICE KIT, 1 Device by Does not apply route daily, Disp: 1 kit, Rfl: 0    glucose blood VI test strips (EXACTECH TEST) strip, 1 each by In Vitro route daily As needed. , Disp: 100 each, Rfl: 3    famotidine (PEPCID) 20 MG tablet, Take 20 mg by mouth daily. , Disp: , Rfl:     Multiple Vitamin (MULTI-VITAMIN) TABS, Take 1 tablet by mouth Daily. , Disp: , Rfl:   Social History     Socioeconomic History    Marital status:      Spouse name: None    Number of children: None    Years of education: None    Highest education level: None   Occupational History    None   Social Needs    Financial resource strain: None    Food insecurity:     Worry: None     Inability: None    Transportation needs:     Medical: None     Non-medical: None   Tobacco Use    Smoking status: Former Smoker     Packs/day: 1.50     Years: 25.00     Pack years: 37.50     Last attempt to quit: 2002     Years since quittin.2    Smokeless tobacco: Never Used   Substance and Sexual Activity    Alcohol use: Yes     Comment: Social     Drug use: No    Sexual activity: None   Lifestyle    Physical activity:     Days per week: None     Minutes per session: None    Stress: None   Relationships    Social connections:     Talks on phone: None     Gets together: None     Attends [employer] service: None     Active member of club or organization: None     Attends meetings of clubs or organizations: None     Relationship status: None    Intimate partner violence:     Fear of current or ex partner: None     Emotionally abused: None     Physically abused: None     Forced sexual activity: None   Other Topics Concern    None   Social History Narrative    None       I have reviewed Rohan's allergies, medications, problem list, medical, social and family history and have updated as needed in the electronic medical record    Review of Systems   Constitutional: Negative for activity change, appetite change, chills, diaphoresis, fatigue, fever and unexpected weight change. HENT: Negative for congestion, dental problem, drooling, ear discharge, ear pain, facial swelling, hearing loss, mouth sores, nosebleeds, postnasal drip, rhinorrhea, sinus pressure, sinus pain, sneezing, sore throat, tinnitus, trouble swallowing and voice change. Eyes: Negative for visual disturbance. Respiratory: Negative for cough, chest tightness, shortness of breath and wheezing. Cardiovascular: Negative for chest pain, palpitations and leg swelling. Gastrointestinal: Negative for abdominal pain, constipation, diarrhea, nausea and vomiting. Endocrine: Negative for cold intolerance, heat intolerance, polydipsia, polyphagia and polyuria. Genitourinary: Negative for difficulty urinating, frequency and urgency. Musculoskeletal: Negative for arthralgias, back pain, gait problem, joint swelling, myalgias, neck pain and neck stiffness. Skin: Negative for color change, pallor, rash and wound. Allergic/Immunologic: Negative for environmental allergies, food allergies and immunocompromised state. Neurological: Negative for dizziness, tremors, seizures, syncope, facial asymmetry, speech difficulty, weakness, light-headedness, numbness and headaches. Hematological: Negative for adenopathy. Does not bruise/bleed easily. Psychiatric/Behavioral: Negative for agitation, behavioral problems, confusion, decreased concentration, dysphoric mood, hallucinations, self-injury, sleep disturbance and suicidal ideas. The patient is not nervous/anxious and is not hyperactive.         OBJECTIVE:     VS:  Wt Readings from Last 3 Encounters:   05/14/19 202 lb (91.6 kg)   02/14/19 200 lb 12.8 oz (91.1 kg)   11/09/18 199 lb (90.3 kg)                       Vitals:    05/14/19 1525 05/14/19 1529 05/14/19 1605   BP: (!) 148/60 132/80 118/74   Site:   Left Upper Arm   Position:   Sitting   Cuff Size:   Large Adult   Pulse: 69     Resp: 16     Temp: 97.5 °F (36.4 °C)     TempSrc: Temporal     SpO2: 98%     Weight: 202 lb (91.6 kg)     Height: 5' 7\" (1.702 m)         General: Alert and oriented to person, place, and time, well developed and well nourished, in no acute distress  SKIN: Warm and dry, intact without any rash, masses or lesions  HEAD: normocephalic, atraumatic  Eyes: sclera/conjunctiva clear, PERRLA, EOMI's intact  ENT: tympanic membranes, external ear and ear canal normal bilaterally, normal hearing, Nose without deformity, nasal mucosa and turbinates normal without polyps   Throat: clear, tongue midline, tonsils 2+, clear drainage with posterior pharyngeal cobblestone pattern, no masses or lesions noted, good dentition  Neck: supple and non-tender without mass, trachea midline, no cervical lymphadenopathy, no bruit, no thyromegaly or nodules  Cardiovascular: regular rate and regular rhythm, normal S1 and S2, no murmurs, rubs, clicks, or gallop. Distal pulses intact, no carotid bruits. No edema  Pulmonary/Chest: clear to auscultation bilaterally, no wheezes, rales or rhonchi, normal air movement, no respiratory distress  Abdomen: soft, non-tender, non-distended, normal bowel sounds, no masses or hepatosplenomegaly  Musculoskeletal: Normal ROM, no joint swelling, deformity or tenderness   Neurologic: reflexes normal and symmetric, no cranial nerve deficit, gait, coordination and speech normal  Extremities: no clubbing, cyanosis, or edema. Psychiatric: Good eye contact, normal mood and affect, answers questions appropriately    ASSESSMENT/PLAN   Filiberto Dukes was seen today for diabetes. Diagnoses and all orders for this visit:    Type 2 diabetes mellitus without complication, without long-term current use of insulin ([facility]) improving  -     POCT glycosylated hemoglobin (Hb A1C) 7.5% today  -     metFORMIN (GLUCOPHAGE) 1000 MG tablet; Take 1 tablet by mouth 2 times daily (with meals)  -     Discontinue: empagliflozin (JARDIANCE) 10 MG tablet;  Take 1 tablet by mouth daily  -     empagliflozin (JARDIANCE) 10 MG tablet; Take 1 tablet by mouth daily  Monitor BS at different times: 1 day fasting, then next day 2 hours after lunch, next day 2 hours after dinner, next day at bedtime then start over and log all values. Bring log to next appointment  Foot exam every day; wash and dry well between toes, look for any redness, cracks, wounds notify provider if any problems occur  Reminder for annual Eye exam  Reminder for Podiatry visits Q 2 Months for toenail care if needed  Reminder to keep vaccines up dated  Exercise 30 minutes daily  Recommend Diabetic Education Classes if you have not already attended    Not addressed today  Essential hypertension  -     atenolol (TENORMIN) 25 MG tablet; TAKE ONE TABLET BY MOUTH ONCE A DAY. -     lisinopril (PRINIVIL;ZESTRIL) 20 MG tablet; Take 1 tablet by mouth daily    Mixed hyperlipidemia  -     atorvastatin (LIPITOR) 40 MG tablet; Take 1 tablet by mouth daily      I will call with labs in the morning. If to continue the metformin or not pending lab results          Return in about 3 months (around 8/14/2019) for DM, HTN. Discussed weight loss, Discussed exercising 30 minutes daily and Discussed taking medications as directed and adverse effects        I have reviewed my findings and recommendations with Bernardino Pierson.     Malina Garay, NP-C, FNP-BC

## 2019-05-14 NOTE — PATIENT INSTRUCTIONS
Patient Education        Diabetes Foot Health: Care Instructions  Your Care Instructions    When you have diabetes, your feet need extra care and attention. Diabetes can damage the nerve endings and blood vessels in your feet, making you less likely to notice when your feet are injured. Diabetes also limits your body's ability to fight infection and get blood to areas that need it. If you get a minor foot injury, it could become an ulcer or a serious infection. With good foot care, you can prevent most of these problems. Caring for your feet can be quick and easy. Most of the care can be done when you are bathing or getting ready for bed. Follow-up care is a key part of your treatment and safety. Be sure to make and go to all appointments, and call your doctor if you are having problems. It's also a good idea to know your test results and keep a list of the medicines you take. How can you care for yourself at home? · Keep your blood sugar close to normal by watching what and how much you eat, monitoring blood sugar, taking medicines if prescribed, and getting regular exercise. · Do not smoke. Smoking affects blood flow and can make foot problems worse. If you need help quitting, talk to your doctor about stop-smoking programs and medicines. These can increase your chances of quitting for good. · Eat a diet that is low in fats. High fat intake can cause fat to build up in your blood vessels and decrease blood flow. · Inspect your feet daily for blisters, cuts, cracks, or sores. If you cannot see well, use a mirror or have someone help you. · Take care of your feet:  ? Wash your feet every day. Use warm (not hot) water. Check the water temperature with your wrists or other part of your body, not your feet. ? Dry your feet well. Pat them dry. Do not rub the skin on your feet too hard. Dry well between your toes.  If the skin on your feet stays moist, bacteria or a fungus can grow, which can lead to infection. ? Keep your skin soft. Use moisturizing skin cream to keep the skin on your feet soft and prevent calluses and cracks. But do not put the cream between your toes, and stop using any cream that causes a rash. ? Clean underneath your toenails carefully. Do not use a sharp object to clean underneath your toenails. Use the blunt end of a nail file or other rounded tool. ? Trim and file your toenails straight across to prevent ingrown toenails. Use a nail clipper, not scissors. Use an emery board to smooth the edges. · Change socks daily. Socks without seams are best, because seams often rub the feet. You can find socks for people with diabetes from specialty catalogs. · Look inside your shoes every day for things like gravel or torn linings, which could cause blisters or sores. · Buy shoes that fit well:  ? Look for shoes that have plenty of space around the toes. This helps prevent bunions and blisters. ? Try on shoes while wearing the kind of socks you will usually wear with the shoes. ? Avoid plastic shoes. They may rub your feet and cause blisters. Good shoes should be made of materials that are flexible and breathable, such as leather or cloth. ? Break in new shoes slowly by wearing them for no more than an hour a day for several days. Take extra time to check your feet for red areas, blisters, or other problems after you wear new shoes. · Do not go barefoot. Do not wear sandals, and do not wear shoes with very thin soles. Thin soles are easy to puncture. They also do not protect your feet from hot pavement or cold weather. · Have your doctor check your feet during each visit. If you have a foot problem, see your doctor. Do not try to treat an early foot problem at home. Home remedies or treatments that you can buy without a prescription (such as corn removers) can be harmful. · Always get early treatment for foot problems.  A minor irritation can lead to a major problem if not properly cared for early. When should you call for help? Call your doctor now or seek immediate medical care if:    · You have a foot sore, an ulcer or break in the skin that is not healing after 4 days, bleeding corns or calluses, or an ingrown toenail.     · You have blue or black areas, which can mean bruising or blood flow problems.     · You have peeling skin or tiny blisters between your toes or cracking or oozing of the skin.     · You have a fever for more than 24 hours and a foot sore.     · You have new numbness or tingling in your feet that does not go away after you move your feet or change positions.     · You have unexplained or unusual swelling of the foot or ankle.    Watch closely for changes in your health, and be sure to contact your doctor if:    · You cannot do proper foot care. Where can you learn more? Go to https://Chutepeshellie.VHX. org and sign in to your [employer] account. Enter A739 in the Everyone Counts box to learn more about \"Diabetes Foot Health: Care Instructions. \"     If you do not have an account, please click on the \"Sign Up Now\" link. Current as of: July 25, 2018  Content Version: 12.0  © 2022-2142 Healthwise, Incorporated. Care instructions adapted under license by [facility] ([facility]). If you have questions about a medical condition or this instruction, always ask your healthcare professional. [place] [address] any warranty or liability for your use of this information. Patient Education        Diabetes and Preventing Falls: Care Instructions  Your Care Instructions    If you are an older adult who has diabetes, you may have a higher risk of falling. Complications of diabetes--such as nerve damage, foot problems, and reduced vision--may increase your risk of a fall. Some of your medicines also may add to your risk. By making your home safer, you can lower your risk of falling.  Doing things to prevent diabetes complications may also help to lower your risk.  You can make your home safer with a few simple measures. Follow-up care is a key part of your treatment and safety. Be sure to make and go to all appointments, and call your doctor if you are having problems. It's also a good idea to know your test results and keep a list of the medicines you take. How can you care for yourself at home? Taking care of yourself  · Keep your blood sugar at a target level (which you set with your doctor). · Exercise regularly to improve your strength, muscle tone, and balance. Walk if you can. Swimming may be a good choice if you cannot walk easily. · Have your vision checked as often as your doctor recommends. It is usually once a year or more often if you have eye problems. · Know the side effects of the medicines you take. Ask your doctor or pharmacist whether the medicines you take can affect your balance. Sleeping pills or sedatives can affect your balance. · Limit the amount of alcohol you drink. Alcohol can impair your balance and other senses. · Have your doctor check your feet during each visit. If you have a foot problem, see your doctor. Preventing falls at home  · Remove raised doorway thresholds, throw rugs, and clutter. Repair loose carpet or raised areas in the floor. · Move furniture and electrical cords to keep them out of walking paths. · Use nonskid floor wax, and wipe up spills right away, especially on ceramic tile floors. · If you use a walker or cane, put rubber tips on it. If you use crutches, clean the bottoms of them regularly with an abrasive pad, such as steel wool. · Keep your house well lit, especially [place], and outside walkways. Use night-lights in areas such as hallways and bathrooms. Add extra light switches or use remote switches (such as switches that go on or off when you clap your hands) to make it easier to turn lights on if you have to get up during the night. · Install sturdy handrails on stairways.  Put grab bars near your shower, bathtub, and toilet. · Store household items on low shelves so that you do not have to climb or reach high. Or use a reaching device that you can get at a medical supply store. If you have to climb for something, use a step stool with handrails, or ask someone to get it for you. · Keep a cordless phone and a flashlight with new batteries by your bed. If possible, put a phone in each of the main rooms of your house, or carry a cell phone in case you fall and cannot reach a phone. Or you can wear a device around your neck or wrist. You push a button that sends a signal for help. · Wear low-heeled shoes that fit well and give your feet good support. Use footwear with nonskid soles. Check the heels and soles of your shoes for wear. Repair or replace worn heels or soles. · Do not wear socks without shoes on wood floors. · Walk on the grass when the sidewalks are slippery. If you live in an area that gets snow and ice in the winter, sprinkle salt on slippery steps and sidewalks. Where can you learn more? Go to https://[employer].UCROO. org and sign in to your [employer] account. Enter L434 in the [facility] box to learn more about \"Diabetes and Preventing Falls: Care Instructions. \"     If you do not have an account, please click on the \"Sign Up Now\" link. Current as of: November 7, 2018  Content Version: 12.0  © 7869-0372 Healthwise, Incorporated. Care instructions adapted under license by [facility] ([facility]). If you have questions about a medical condition or this instruction, always ask your healthcare professional. [place] [address] any warranty or liability for your use of this information.

## 2019-08-15 ENCOUNTER — TELEPHONE (OUTPATIENT)
Dept: FAMILY MEDICINE CLINIC | Age: 59
End: 2019-08-15

## 2019-08-15 ENCOUNTER — OFFICE VISIT (OUTPATIENT)
Dept: FAMILY MEDICINE CLINIC | Age: 59
End: 2019-08-15
Payer: COMMERCIAL

## 2019-08-15 VITALS
WEIGHT: 197.7 LBS | TEMPERATURE: 97.5 F | DIASTOLIC BLOOD PRESSURE: 52 MMHG | SYSTOLIC BLOOD PRESSURE: 100 MMHG | RESPIRATION RATE: 16 BRPM | HEART RATE: 54 BPM | BODY MASS INDEX: 29.96 KG/M2 | HEIGHT: 68 IN

## 2019-08-15 DIAGNOSIS — D12.6 TUBULAR ADENOMA OF COLON: ICD-10-CM

## 2019-08-15 DIAGNOSIS — E11.9 TYPE 2 DIABETES MELLITUS WITHOUT COMPLICATION, WITHOUT LONG-TERM CURRENT USE OF INSULIN (HCC): Primary | ICD-10-CM

## 2019-08-15 DIAGNOSIS — E78.2 MIXED HYPERLIPIDEMIA: ICD-10-CM

## 2019-08-15 DIAGNOSIS — I10 ESSENTIAL HYPERTENSION: ICD-10-CM

## 2019-08-15 LAB
CHP ED QC CHECK: NORMAL
GLUCOSE BLD-MCNC: 162 MG/DL
HBA1C MFR BLD: 7.1 %

## 2019-08-15 PROCEDURE — 99214 OFFICE O/P EST MOD 30 MIN: CPT | Performed by: NURSE PRACTITIONER

## 2019-08-15 PROCEDURE — 82962 GLUCOSE BLOOD TEST: CPT | Performed by: NURSE PRACTITIONER

## 2019-08-15 PROCEDURE — 83036 HEMOGLOBIN GLYCOSYLATED A1C: CPT | Performed by: NURSE PRACTITIONER

## 2019-08-15 RX ORDER — LISINOPRIL 10 MG/1
10 TABLET ORAL DAILY
Qty: 30 TABLET | Refills: 5 | Status: SHIPPED
Start: 2019-08-15 | End: 2020-02-17 | Stop reason: SDUPTHER

## 2019-08-15 RX ORDER — ATENOLOL 25 MG/1
TABLET ORAL
Qty: 30 TABLET | Refills: 6 | Status: SHIPPED
Start: 2019-08-15 | End: 2020-02-17 | Stop reason: SDUPTHER

## 2019-08-15 RX ORDER — LISINOPRIL AND HYDROCHLOROTHIAZIDE 20; 12.5 MG/1; MG/1
TABLET ORAL
Refills: 3 | COMMUNITY
Start: 2019-06-18 | End: 2019-08-15 | Stop reason: DRUGHIGH

## 2019-08-15 RX ORDER — ATORVASTATIN CALCIUM 40 MG/1
40 TABLET, FILM COATED ORAL DAILY
Qty: 30 TABLET | Refills: 6 | Status: SHIPPED
Start: 2019-08-15 | End: 2020-02-17 | Stop reason: SDUPTHER

## 2019-08-15 ASSESSMENT — ENCOUNTER SYMPTOMS
ABDOMINAL PAIN: 0
RHINORRHEA: 0
NAUSEA: 0
CHEST TIGHTNESS: 0
DIARRHEA: 1
COUGH: 0
TROUBLE SWALLOWING: 0
VOICE CHANGE: 0
WHEEZING: 0
VOMITING: 0
CONSTIPATION: 0
SHORTNESS OF BREATH: 0
SINUS PAIN: 0
COLOR CHANGE: 0
BACK PAIN: 0
SINUS PRESSURE: 0
FACIAL SWELLING: 0
SORE THROAT: 0

## 2019-08-15 NOTE — PROGRESS NOTES
OFFICE PROGRESS NOTE  [address] [place] [address] Rd  [address] [facility] [address]  Dept: [phone]   Chief Complaint   Patient presents with    3 Month Follow-Up    Hypertension    Diabetes    Diarrhea     pt believes due to metformin         HPI:     61 y.o. male presents today for follow-up of diabetes [place]. In-office bloodsugar is:   Results for POC orders placed in visit on 08/15/19   POCT glycosylated hemoglobin (Hb A1C)   Result Value Ref Range    Hemoglobin A1C 7.1 %   POCT Glucose   Result Value Ref Range    Glucose 162 mg/dL    QC OK? Patient reports being compliant to low carbohydrate diet and increasing weekly exercises. Currently, the patient is treated with medication(s): metformin 1000 mg BID, Jardiance 10 mg daily. Patient reports checking home blood sugar 2 times per week. Patient states home blood sugar ranges from 140 - 150. Patient denies hypoglycemic episodes and understand to take sweetened beverages or a snack if hypoglycemic symptoms are suspected. Has had diarrhea since increasing the metformin to 1000 mg BID, not in evening but in the mornings, he is taking it with cereal in the morning. He did stop the metformin for a week and had no diarrhea. Hypertension: Patient here for follow-up of elevated blood pressure. He is exercising and is adherent to low salt diet. Blood pressure is well controlled at home. Cardiac symptoms none. Patient denies chest pain, chest pressure/discomfort, claudication, dyspnea, exertional chest pressure/discomfort, fatigue, irregular heart beat, lower extremity edema, near-syncope, orthopnea, palpitations, paroxysmal nocturnal dyspnea, syncope and tachypnea. Cardiovascular risk factors: advanced age (older than 54 for men, 72 for women), diabetes mellitus, dyslipidemia, hypertension, male gender and obesity (BMI >= 30 kg/m2). Use of agents associated with hypertension: none.  History of target organ damage: none. Today's vital signs are as follows:  BP (!) 100/52 (Site: Left Upper Arm, Position: Sitting, Cuff Size: Medium Adult)   Pulse 54   Temp 97.5 °F (36.4 °C) (Temporal)   Resp 16   Ht 5' 8\" (1.727 m)   Wt 197 lb 11.2 oz (89.7 kg)   BMI 30.06 kg/m²     Lab Results   Component Value Date    LABA1C 7.1 08/15/2019     Pain scale: 0/10        Current Outpatient Medications:     lisinopril (PRINIVIL;ZESTRIL) 10 MG tablet, Take 1 tablet by mouth daily, Disp: 30 tablet, Rfl: 5    empagliflozin (JARDIANCE) 10 MG tablet, Take 1 tablet by mouth daily, Disp: 30 tablet, Rfl: 6    atenolol (TENORMIN) 25 MG tablet, TAKE ONE TABLET BY MOUTH ONCE A DAY., Disp: 30 tablet, Rfl: 6    atorvastatin (LIPITOR) 40 MG tablet, Take 1 tablet by mouth daily, Disp: 30 tablet, Rfl: 6    metFORMIN (GLUCOPHAGE) 1000 MG tablet, Take 1 tablet by mouth 2 times daily (with meals), Disp: 60 tablet, Rfl: 5    fluticasone (FLONASE) 50 MCG/ACT nasal spray, 1 spray by Nasal route daily, Disp: 3 Bottle, Rfl: 3    Blood Glucose Monitoring Suppl (ACURA BLOOD GLUCOSE METER) W/DEVICE KIT, 1 Device by Does not apply route daily, Disp: 1 kit, Rfl: 0    glucose blood VI test strips (EXACTECH TEST) strip, 1 each by In Vitro route daily As needed. , Disp: 100 each, Rfl: 3    famotidine (PEPCID) 20 MG tablet, Take 20 mg by mouth daily. , Disp: , Rfl:     Multiple Vitamin (MULTI-VITAMIN) TABS, Take 1 tablet by mouth Daily. , Disp: , Rfl:   Social History     Socioeconomic History    Marital status:      Spouse name: None    Number of children: None    Years of education: None    Highest education level: None   Occupational History    None   Social Needs    Financial resource strain: None    Food insecurity:     Worry: None     Inability: None    Transportation needs:     Medical: None     Non-medical: None   Tobacco Use    Smoking status: Former Smoker     Packs/day: 1.50     Years: 25.00     Pack years: 37.50     Last

## 2019-08-15 NOTE — PATIENT INSTRUCTIONS
with the teeth and gums. If you have any problems with your teeth or gums, see your dentist.  How do you care for your teeth and gums when you have diabetes? · Brush your teeth twice a day. · Floss daily. Make sure to press the floss against your teeth and not your gums. · Check each day for areas where your gums might be red or painful. Be sure to let your dentist know of any sores in your mouth. · See your dentist regularly for professional cleaning of your teeth and to look for gum problems. Many dentists recommend getting checkups twice a year. Remind your dentist that you have diabetes before any work is done. · Don't smoke or use smokeless tobacco. Tobacco use with diabetes can lead to a greater risk of severe gum disease. If you need help quitting, talk to your doctor about stop-smoking programs and medicines. These can increase your chances of quitting for good. Follow-up care is a key part of your treatment and safety. Be sure to make and go to all appointments, and call your doctor if you are having problems. It's also a good idea to know your test results and keep a list of the medicines you take. Where can you learn more? Go to https://FoodzaipepicewActuatedMedical.RegainGo. org and sign in to your [employer] account. Enter H287 in the [employer] box to learn more about \"Learning About Diabetes and Your Teeth. \"     If you do not have an account, please click on the \"Sign Up Now\" link. Current as of: July 25, 2018  Content Version: 12.1  © 7578-2726 Healthwise, Incorporated. Care instructions adapted under license by [facility] ([facility]). If you have questions about a medical condition or this instruction, always ask your healthcare professional. [place] [address] any warranty or liability for your use of this information.          Patient Education        Diabetes and Preventing Falls: Care Instructions  Your Care Instructions    If you are an older adult who has diabetes, you may have a higher risk of falling. Complications of diabetes--such as nerve damage, foot problems, and reduced vision--may increase your risk of a fall. Some of your medicines also may add to your risk. By making your home safer, you can lower your risk of falling. Doing things to prevent diabetes complications may also help to lower your risk. You can make your home safer with a few simple measures. Follow-up care is a key part of your treatment and safety. Be sure to make and go to all appointments, and call your doctor if you are having problems. It's also a good idea to know your test results and keep a list of the medicines you take. How can you care for yourself at home? Taking care of yourself  · Keep your blood sugar at a target level (which you set with your doctor). · Exercise regularly to improve your strength, muscle tone, and balance. Walk if you can. Swimming may be a good choice if you cannot walk easily. · Have your vision checked as often as your doctor recommends. It is usually once a year or more often if you have eye problems. · Know the side effects of the medicines you take. Ask your doctor or pharmacist whether the medicines you take can affect your balance. Sleeping pills or sedatives can affect your balance. · Limit the amount of alcohol you drink. Alcohol can impair your balance and other senses. · Have your doctor check your feet during each visit. If you have a foot problem, see your doctor. Preventing falls at home  · Remove raised doorway thresholds, throw rugs, and clutter. Repair loose carpet or raised areas in the floor. · Move furniture and electrical cords to keep them out of walking paths. · Use nonskid floor wax, and wipe up spills right away, especially on ceramic tile floors. · If you use a walker or cane, put rubber tips on it. If you use crutches, clean the bottoms of them regularly with an abrasive pad, such as steel wool.   · Keep your house well lit, especially stairways, porches, and outside walkways. Use night-lights in areas such as hallways and bathrooms. Add extra light switches or use remote switches (such as switches that go on or off when you clap your hands) to make it easier to turn lights on if you have to get up during the night. · Install sturdy handrails on stairways. Put grab bars near your shower, bathtub, and toilet. · Store household items on low shelves so that you do not have to climb or reach high. Or use a reaching device that you can get at a medical supply store. If you have to climb for something, use a step stool with handrails, or ask someone to get it for you. · Keep a cordless phone and a flashlight with new batteries by your bed. If possible, put a phone in each of the main rooms of your house, or carry a cell phone in case you fall and cannot reach a phone. Or you can wear a device around your neck or wrist. You push a button that sends a signal for help. · Wear low-heeled shoes that fit well and give your feet good support. Use footwear with nonskid soles. Check the heels and soles of your shoes for wear. Repair or replace worn heels or soles. · Do not wear socks without shoes on wood floors. · Walk on the grass when the sidewalks are slippery. If you live in an area that gets snow and ice in the winter, sprinkle salt on slippery steps and sidewalks. Where can you learn more? Go to https://Star.menicolás.Comparameglio.it. org and sign in to your [employer] account. Enter J065 in the [facility] box to learn more about \"Diabetes and Preventing Falls: Care Instructions. \"     If you do not have an account, please click on the \"Sign Up Now\" link. Current as of: November 7, 2018  Content Version: 12.1  © 0808-0558 Healthwise, Incorporated. Care instructions adapted under license by [facility] ([facility]).  If you have questions about a medical condition or this instruction, always ask your healthcare professional. Jose Cruz Espitia feet stays moist, bacteria or a fungus can grow, which can lead to infection. ? Keep your skin soft. Use moisturizing skin cream to keep the skin on your feet soft and prevent calluses and cracks. But do not put the cream between your toes, and stop using any cream that causes a rash. ? Clean underneath your toenails carefully. Do not use a sharp object to clean underneath your toenails. Use the blunt end of a nail file or other rounded tool. ? Trim and file your toenails straight across to prevent ingrown toenails. Use a nail clipper, not scissors. Use an emery board to smooth the edges. · Change socks daily. Socks without seams are best, because seams often rub the feet. You can find socks for people with diabetes from specialty catalogs. · Look inside your shoes every day for things like gravel or torn linings, which could cause blisters or sores. · Buy shoes that fit well:  ? Look for shoes that have plenty of space around the toes. This helps prevent bunions and blisters. ? Try on shoes while wearing the kind of socks you will usually wear with the shoes. ? Avoid plastic shoes. They may rub your feet and cause blisters. Good shoes should be made of materials that are flexible and breathable, such as leather or cloth. ? Break in new shoes slowly by wearing them for no more than an hour a day for several days. Take extra time to check your feet for red areas, blisters, or other problems after you wear new shoes. · Do not go barefoot. Do not wear sandals, and do not wear shoes with very thin soles. Thin soles are easy to puncture. They also do not protect your feet from hot pavement or cold weather. · Have your doctor check your feet during each visit. If you have a foot problem, see your doctor. Do not try to treat an early foot problem at home. Home remedies or treatments that you can buy without a prescription (such as corn removers) can be harmful. · Always get early treatment for foot problems.  A

## 2019-09-04 ENCOUNTER — TELEPHONE (OUTPATIENT)
Dept: FAMILY MEDICINE CLINIC | Age: 59
End: 2019-09-04

## 2019-09-04 NOTE — TELEPHONE ENCOUNTER
Called Rohan to discuss consultation appointment with the Gastroenterology clinic. He was unavailable. Left message with wife to have him call me back.      (See letter scanned in Media)

## 2019-09-13 ENCOUNTER — OFFICE VISIT (OUTPATIENT)
Dept: FAMILY MEDICINE CLINIC | Age: 59
End: 2019-09-13
Payer: COMMERCIAL

## 2019-09-13 VITALS
TEMPERATURE: 97.4 F | WEIGHT: 195.6 LBS | BODY MASS INDEX: 29.64 KG/M2 | RESPIRATION RATE: 18 BRPM | OXYGEN SATURATION: 96 % | HEIGHT: 68 IN | HEART RATE: 58 BPM | SYSTOLIC BLOOD PRESSURE: 110 MMHG | DIASTOLIC BLOOD PRESSURE: 74 MMHG

## 2019-09-13 DIAGNOSIS — I10 ESSENTIAL HYPERTENSION: Primary | ICD-10-CM

## 2019-09-13 DIAGNOSIS — R19.7 DIARRHEA, UNSPECIFIED TYPE: ICD-10-CM

## 2019-09-13 PROCEDURE — 99213 OFFICE O/P EST LOW 20 MIN: CPT | Performed by: NURSE PRACTITIONER

## 2019-09-13 ASSESSMENT — ENCOUNTER SYMPTOMS
DIARRHEA: 1
COLOR CHANGE: 0
COUGH: 0
NAUSEA: 0
SHORTNESS OF BREATH: 0
WHEEZING: 0
VOMITING: 0
CONSTIPATION: 0
ABDOMINAL PAIN: 0

## 2019-11-15 ENCOUNTER — OFFICE VISIT (OUTPATIENT)
Dept: FAMILY MEDICINE CLINIC | Age: 59
End: 2019-11-15
Payer: COMMERCIAL

## 2019-11-15 VITALS
TEMPERATURE: 97.4 F | HEART RATE: 57 BPM | SYSTOLIC BLOOD PRESSURE: 128 MMHG | RESPIRATION RATE: 18 BRPM | WEIGHT: 196 LBS | DIASTOLIC BLOOD PRESSURE: 80 MMHG | OXYGEN SATURATION: 98 % | BODY MASS INDEX: 29.7 KG/M2 | HEIGHT: 68 IN

## 2019-11-15 DIAGNOSIS — E11.9 TYPE 2 DIABETES MELLITUS WITHOUT COMPLICATION, WITHOUT LONG-TERM CURRENT USE OF INSULIN (HCC): Primary | ICD-10-CM

## 2019-11-15 DIAGNOSIS — Z12.5 SCREENING PSA (PROSTATE SPECIFIC ANTIGEN): ICD-10-CM

## 2019-11-15 DIAGNOSIS — I10 ESSENTIAL HYPERTENSION: ICD-10-CM

## 2019-11-15 LAB
CHP ED QC CHECK: NORMAL
CREATININE URINE POCT: NORMAL
GLUCOSE BLD-MCNC: 144 MG/DL
HBA1C MFR BLD: 7.4 %
MICROALBUMIN/CREAT 24H UR: NORMAL MG/G{CREAT}
MICROALBUMIN/CREAT UR-RTO: NORMAL

## 2019-11-15 PROCEDURE — 99214 OFFICE O/P EST MOD 30 MIN: CPT | Performed by: NURSE PRACTITIONER

## 2019-11-15 PROCEDURE — 83036 HEMOGLOBIN GLYCOSYLATED A1C: CPT | Performed by: NURSE PRACTITIONER

## 2019-11-15 PROCEDURE — 82962 GLUCOSE BLOOD TEST: CPT | Performed by: NURSE PRACTITIONER

## 2019-11-15 PROCEDURE — 82044 UR ALBUMIN SEMIQUANTITATIVE: CPT | Performed by: NURSE PRACTITIONER

## 2019-11-15 ASSESSMENT — ENCOUNTER SYMPTOMS
SORE THROAT: 0
BACK PAIN: 0
CONSTIPATION: 0
COUGH: 0
DIARRHEA: 0
CHEST TIGHTNESS: 0
ABDOMINAL PAIN: 0
SINUS PRESSURE: 0
RHINORRHEA: 0
VOMITING: 0
FACIAL SWELLING: 0
COLOR CHANGE: 0
NAUSEA: 0
SINUS PAIN: 0
VOICE CHANGE: 0
SHORTNESS OF BREATH: 0
TROUBLE SWALLOWING: 0
WHEEZING: 0

## 2019-12-02 DIAGNOSIS — E11.9 TYPE 2 DIABETES MELLITUS WITHOUT COMPLICATION, WITHOUT LONG-TERM CURRENT USE OF INSULIN (HCC): Primary | ICD-10-CM

## 2019-12-30 ENCOUNTER — OFFICE VISIT (OUTPATIENT)
Dept: FAMILY MEDICINE CLINIC | Age: 59
End: 2019-12-30
Payer: COMMERCIAL

## 2019-12-30 ENCOUNTER — TELEPHONE (OUTPATIENT)
Dept: ADMINISTRATIVE | Age: 59
End: 2019-12-30

## 2019-12-30 VITALS
HEIGHT: 68 IN | OXYGEN SATURATION: 98 % | RESPIRATION RATE: 18 BRPM | WEIGHT: 200.9 LBS | HEART RATE: 67 BPM | BODY MASS INDEX: 30.45 KG/M2 | DIASTOLIC BLOOD PRESSURE: 78 MMHG | SYSTOLIC BLOOD PRESSURE: 120 MMHG | TEMPERATURE: 98.1 F

## 2019-12-30 DIAGNOSIS — K27.9 PEPTIC ULCER: Primary | ICD-10-CM

## 2019-12-30 PROCEDURE — 99214 OFFICE O/P EST MOD 30 MIN: CPT | Performed by: NURSE PRACTITIONER

## 2019-12-30 RX ORDER — PANTOPRAZOLE SODIUM 40 MG/1
40 TABLET, DELAYED RELEASE ORAL
Qty: 30 TABLET | Refills: 1 | Status: SHIPPED
Start: 2019-12-30 | End: 2020-02-17 | Stop reason: ALTCHOICE

## 2019-12-30 RX ORDER — ATORVASTATIN CALCIUM 40 MG/1
40 TABLET, FILM COATED ORAL DAILY
Qty: 30 TABLET | Refills: 6 | Status: CANCELLED | OUTPATIENT
Start: 2019-12-30

## 2019-12-30 ASSESSMENT — ENCOUNTER SYMPTOMS
DIARRHEA: 0
VOICE CHANGE: 0
CHEST TIGHTNESS: 0
ABDOMINAL PAIN: 1
NAUSEA: 0
FACIAL SWELLING: 0
SORE THROAT: 0
VOMITING: 0
SINUS PRESSURE: 0
SINUS PAIN: 0
COLOR CHANGE: 0
SHORTNESS OF BREATH: 0
BACK PAIN: 0
COUGH: 0
CONSTIPATION: 0
TROUBLE SWALLOWING: 0
WHEEZING: 0
RHINORRHEA: 0

## 2020-01-21 ENCOUNTER — OFFICE VISIT (OUTPATIENT)
Dept: FAMILY MEDICINE CLINIC | Age: 60
End: 2020-01-21
Payer: COMMERCIAL

## 2020-01-21 VITALS
HEIGHT: 68 IN | WEIGHT: 205.2 LBS | BODY MASS INDEX: 31.1 KG/M2 | HEART RATE: 72 BPM | OXYGEN SATURATION: 98 % | DIASTOLIC BLOOD PRESSURE: 70 MMHG | SYSTOLIC BLOOD PRESSURE: 124 MMHG

## 2020-01-21 PROBLEM — K27.9 PEPTIC ULCER: Status: ACTIVE | Noted: 2020-01-21

## 2020-01-21 PROCEDURE — G0444 DEPRESSION SCREEN ANNUAL: HCPCS | Performed by: NURSE PRACTITIONER

## 2020-01-21 PROCEDURE — 99213 OFFICE O/P EST LOW 20 MIN: CPT | Performed by: NURSE PRACTITIONER

## 2020-01-21 ASSESSMENT — ENCOUNTER SYMPTOMS
SHORTNESS OF BREATH: 0
VOICE CHANGE: 0
TROUBLE SWALLOWING: 0
BACK PAIN: 0
SINUS PAIN: 0
SINUS PRESSURE: 0
WHEEZING: 0
COLOR CHANGE: 0
NAUSEA: 0
CONSTIPATION: 0
COUGH: 0
RHINORRHEA: 0
VOMITING: 0
ABDOMINAL PAIN: 0
CHEST TIGHTNESS: 0
DIARRHEA: 0
SORE THROAT: 0
FACIAL SWELLING: 0

## 2020-01-21 ASSESSMENT — PATIENT HEALTH QUESTIONNAIRE - PHQ9
SUM OF ALL RESPONSES TO PHQ9 QUESTIONS 1 & 2: 0
2. FEELING DOWN, DEPRESSED OR HOPELESS: 0
SUM OF ALL RESPONSES TO PHQ QUESTIONS 1-9: 0
SUM OF ALL RESPONSES TO PHQ QUESTIONS 1-9: 0
1. LITTLE INTEREST OR PLEASURE IN DOING THINGS: 0

## 2020-01-21 NOTE — PROGRESS NOTES
OFFICE PROGRESS NOTE  [address] [place] [address] Rd  [address] [facility] [address]  Dept: [phone]   Chief Complaint   Patient presents with    Other     3 week follow up on Peptic Ulcer    Hypertension     check up         HPI:     Here today for follow up on peptic ulcer and starting the Protonix, He isn't having any more abdominal / epigastric pain, He stopped the ibuprofen. He is doing really well and now will try to wean off the Protonix. He hasn't needed the Gaviscon for the last 2 weeks. Follow up on hypertension since discontinuing the hydrochlorothiazide and starting the Jardiance for his diabetes. His blood pressure is doing well. He is going to go back to [employer] as he has gained 10 pounds in the last 2 1/2 months due to not exercising, has been watching sodium. Tolerating medication well. Current Outpatient Medications:     pantoprazole (PROTONIX) 40 MG tablet, Take 1 tablet by mouth every morning (before breakfast), Disp: 30 tablet, Rfl: 1    empagliflozin (JARDIANCE) 25 MG tablet, Take 25 mg by mouth daily, Disp: 30 tablet, Rfl: 6    lisinopril (PRINIVIL;ZESTRIL) 10 MG tablet, Take 1 tablet by mouth daily, Disp: 30 tablet, Rfl: 5    atenolol (TENORMIN) 25 MG tablet, TAKE ONE TABLET BY MOUTH ONCE A DAY., Disp: 30 tablet, Rfl: 6    atorvastatin (LIPITOR) 40 MG tablet, Take 1 tablet by mouth daily, Disp: 30 tablet, Rfl: 6    fluticasone (FLONASE) 50 MCG/ACT nasal spray, 1 spray by Nasal route daily, Disp: 3 Bottle, Rfl: 3    Blood Glucose Monitoring Suppl (ACURA BLOOD GLUCOSE METER) W/DEVICE KIT, 1 Device by Does not apply route daily, Disp: 1 kit, Rfl: 0    glucose blood VI test strips (EXACTECH TEST) strip, 1 each by In Vitro route daily As needed. , Disp: 100 each, Rfl: 3    Multiple Vitamin (MULTI-VITAMIN) TABS, Take 1 tablet by mouth Daily. , Disp: , Rfl:   Social History     Socioeconomic History    Marital status:  Cardiovascular: Negative for chest pain, palpitations and leg swelling. Gastrointestinal: Negative for abdominal pain, constipation, diarrhea, nausea and vomiting. Endocrine: Negative for cold intolerance, heat intolerance, polydipsia, polyphagia and polyuria. Genitourinary: Negative for difficulty urinating, frequency and urgency. Musculoskeletal: Negative for arthralgias, back pain, gait problem, joint swelling, myalgias, neck pain and neck stiffness. Skin: Negative for color change, pallor, rash and wound. Allergic/Immunologic: Negative for environmental allergies, food allergies and immunocompromised state. Neurological: Negative for dizziness, tremors, seizures, syncope, facial asymmetry, speech difficulty, weakness, light-headedness, numbness and headaches. Hematological: Negative for adenopathy. Does not bruise/bleed easily. Psychiatric/Behavioral: Negative for agitation, behavioral problems, confusion, decreased concentration, dysphoric mood, hallucinations, self-injury, sleep disturbance and suicidal ideas. The patient is not nervous/anxious and is not hyperactive.         OBJECTIVE:     VS:  Wt Readings from Last 3 Encounters:   01/21/20 205 lb 3.2 oz (93.1 kg)   12/30/19 200 lb 14.4 oz (91.1 kg)   11/15/19 196 lb (88.9 kg)                       Vitals:    01/21/20 1021   BP: 124/70   Site: Left Upper Arm   Position: Sitting   Cuff Size: Medium Adult   Pulse: 72   SpO2: 98%   Weight: 205 lb 3.2 oz (93.1 kg)   Height: 5' 8\" (1.727 m)       General: Alert and oriented to person, place, and time, well developed and well nourished, in no acute distress  SKIN: Warm and dry, intact without any rash, masses or lesions  HEAD: normocephalic, atraumatic  Eyes: sclera/conjunctiva clear, PERRLA, EOMI's intact  Neck: supple and non-tender without mass, trachea midline, no cervical lymphadenopathy, no bruit, no thyromegaly or nodules  Cardiovascular: regular rate and regular rhythm, normal S1 and S2, no murmurs, rubs, clicks, or gallop. Distal pulses intact, no carotid bruits. No edema  Pulmonary/Chest: clear to auscultation bilaterally, no wheezes, rales or rhonchi, normal air movement, no respiratory distress  Abdomen: soft, non-tender, non-distended, normal bowel sounds, no masses or hepatosplenomegaly  Musculoskeletal: Normal ROM, no joint swelling, deformity or tenderness   Neurologic: reflexes normal and symmetric, no cranial nerve deficit, gait, coordination and speech normal  Extremities: no clubbing, cyanosis, or edema. Psychiatric: Good eye contact, normal mood and affect, answers questions appropriately    ASSESSMENT/PLAN   Peg Dejuan was seen today for other and hypertension. Diagnoses and all orders for this visit:    Peptic ulcer Improving  Will try to wean off the Pantoprazole slowly  Work on weight loss    Essential hypertension stable  Continue current medication  -Discussed taking medication and directed every day. -Discussed exercising daily 30 minutes 5 times a week for 150 minutes weekly.  -Discussed weight reduction if needed.  -Discussed low sodium diet.  -Discussed limiting caffeine consumption and tobacco cessation and the effects they have on the heart and blood pressure. Depression screening  -     ME DEPRESSION SCREEN ANNUAL                Return for keep appt as scheduled. Discussed weight loss, Discussed exercising 30 minutes daily and Discussed taking medications as directed and adverse effects        I have reviewed my findings and recommendations with Cate Rader.     Figueroa Oconnor, NP-C, FNP-BC

## 2020-02-15 ENCOUNTER — HOSPITAL ENCOUNTER (OUTPATIENT)
Age: 60
Discharge: HOME | End: 2020-02-17
Payer: COMMERCIAL

## 2020-02-15 LAB
ALBUMIN SERPL-MCNC: 4.3 G/DL (ref 3.5–5.2)
ALP BLD-CCNC: 111 U/L (ref 40–129)
ALT SERPL-CCNC: 46 U/L (ref 0–40)
ANION GAP SERPL CALCULATED.3IONS-SCNC: 14 MMOL/L (ref 7–16)
AST SERPL-CCNC: 32 U/L (ref 0–39)
BASOPHILS ABSOLUTE: 0.12 E9/L (ref 0–0.2)
BASOPHILS RELATIVE PERCENT: 1.7 % (ref 0–2)
BILIRUB SERPL-MCNC: 0.7 MG/DL (ref 0–1.2)
BUN BLDV-MCNC: 21 MG/DL (ref 6–20)
CALCIUM SERPL-MCNC: 9.3 MG/DL (ref 8.6–10.2)
CHLORIDE BLD-SCNC: 104 MMOL/L (ref 98–107)
CHOLESTEROL, TOTAL: 163 MG/DL (ref 0–199)
CO2: 22 MMOL/L (ref 22–29)
CREAT SERPL-MCNC: 1.3 MG/DL (ref 0.7–1.2)
EOSINOPHILS ABSOLUTE: 0.27 E9/L (ref 0.05–0.5)
EOSINOPHILS RELATIVE PERCENT: 3.7 % (ref 0–6)
GFR AFRICAN AMERICAN: >60
GFR NON-AFRICAN AMERICAN: 56 ML/MIN/1.73
GLUCOSE BLD-MCNC: 199 MG/DL (ref 74–99)
HBA1C MFR BLD: 8.6 % (ref 4–5.6)
HCT VFR BLD CALC: 47.7 % (ref 37–54)
HDLC SERPL-MCNC: 49 MG/DL
HEMOGLOBIN: 15.4 G/DL (ref 12.5–16.5)
IMMATURE GRANULOCYTES #: 0.04 E9/L
IMMATURE GRANULOCYTES %: 0.6 % (ref 0–5)
LDL CHOLESTEROL CALCULATED: 85 MG/DL (ref 0–99)
LYMPHOCYTES ABSOLUTE: 1.98 E9/L (ref 1.5–4)
LYMPHOCYTES RELATIVE PERCENT: 27.3 % (ref 20–42)
MCH RBC QN AUTO: 29.5 PG (ref 26–35)
MCHC RBC AUTO-ENTMCNC: 32.3 % (ref 32–34.5)
MCV RBC AUTO: 91.4 FL (ref 80–99.9)
MONOCYTES ABSOLUTE: 0.65 E9/L (ref 0.1–0.95)
MONOCYTES RELATIVE PERCENT: 9 % (ref 2–12)
NEUTROPHILS ABSOLUTE: 4.18 E9/L (ref 1.8–7.3)
NEUTROPHILS RELATIVE PERCENT: 57.7 % (ref 43–80)
PDW BLD-RTO: 12.7 FL (ref 11.5–15)
PLATELET # BLD: 210 E9/L (ref 130–450)
PMV BLD AUTO: 12.7 FL (ref 7–12)
POTASSIUM SERPL-SCNC: 4.3 MMOL/L (ref 3.5–5)
PROSTATE SPECIFIC ANTIGEN: 0.3 NG/ML (ref 0–4)
RBC # BLD: 5.22 E12/L (ref 3.8–5.8)
SODIUM BLD-SCNC: 140 MMOL/L (ref 132–146)
TOTAL PROTEIN: 7 G/DL (ref 6.4–8.3)
TRIGL SERPL-MCNC: 144 MG/DL (ref 0–149)
VLDLC SERPL CALC-MCNC: 29 MG/DL
WBC # BLD: 7.2 E9/L (ref 4.5–11.5)

## 2020-02-15 PROCEDURE — 80061 LIPID PANEL: CPT

## 2020-02-15 PROCEDURE — 85025 COMPLETE CBC W/AUTO DIFF WBC: CPT

## 2020-02-15 PROCEDURE — 83036 HEMOGLOBIN GLYCOSYLATED A1C: CPT

## 2020-02-15 PROCEDURE — 80053 COMPREHEN METABOLIC PANEL: CPT

## 2020-02-15 PROCEDURE — G0103 PSA SCREENING: HCPCS

## 2020-02-15 PROCEDURE — 36415 COLL VENOUS BLD VENIPUNCTURE: CPT

## 2020-02-17 ENCOUNTER — OFFICE VISIT (OUTPATIENT)
Dept: FAMILY MEDICINE CLINIC | Age: 60
End: 2020-02-17
Payer: COMMERCIAL

## 2020-02-17 ENCOUNTER — TELEPHONE (OUTPATIENT)
Dept: FAMILY MEDICINE CLINIC | Age: 60
End: 2020-02-17

## 2020-02-17 VITALS
TEMPERATURE: 98 F | RESPIRATION RATE: 14 BRPM | SYSTOLIC BLOOD PRESSURE: 128 MMHG | BODY MASS INDEX: 31.25 KG/M2 | HEIGHT: 68 IN | OXYGEN SATURATION: 98 % | HEART RATE: 72 BPM | DIASTOLIC BLOOD PRESSURE: 80 MMHG | WEIGHT: 206.2 LBS

## 2020-02-17 PROBLEM — L24.9 IRRITANT CONTACT DERMATITIS: Status: RESOLVED | Noted: 2019-02-14 | Resolved: 2020-02-17

## 2020-02-17 PROBLEM — M79.605 LOW BACK PAIN RADIATING TO LEFT LOWER EXTREMITY: Status: ACTIVE | Noted: 2020-02-17

## 2020-02-17 PROBLEM — K27.9 PEPTIC ULCER: Status: RESOLVED | Noted: 2020-01-21 | Resolved: 2020-02-17

## 2020-02-17 PROBLEM — M54.50 LOW BACK PAIN RADIATING TO LEFT LOWER EXTREMITY: Status: ACTIVE | Noted: 2020-02-17

## 2020-02-17 PROCEDURE — 99214 OFFICE O/P EST MOD 30 MIN: CPT | Performed by: NURSE PRACTITIONER

## 2020-02-17 PROCEDURE — 3052F HG A1C>EQUAL 8.0%<EQUAL 9.0%: CPT | Performed by: NURSE PRACTITIONER

## 2020-02-17 RX ORDER — METFORMIN HYDROCHLORIDE 500 MG/1
500 TABLET, EXTENDED RELEASE ORAL
Qty: 30 TABLET | Refills: 5 | Status: SHIPPED
Start: 2020-02-17 | End: 2020-04-01 | Stop reason: SDUPTHER

## 2020-02-17 RX ORDER — LISINOPRIL 10 MG/1
10 TABLET ORAL DAILY
Qty: 90 TABLET | Refills: 3 | Status: SHIPPED
Start: 2020-02-17 | End: 2021-02-19 | Stop reason: SDUPTHER

## 2020-02-17 RX ORDER — ATORVASTATIN CALCIUM 40 MG/1
40 TABLET, FILM COATED ORAL DAILY
Qty: 90 TABLET | Refills: 3 | Status: SHIPPED
Start: 2020-02-17 | End: 2021-02-19 | Stop reason: SDUPTHER

## 2020-02-17 RX ORDER — ATENOLOL 25 MG/1
TABLET ORAL
Qty: 90 TABLET | Refills: 3 | Status: SHIPPED
Start: 2020-02-17 | End: 2021-03-29 | Stop reason: SDUPTHER

## 2020-02-17 ASSESSMENT — ENCOUNTER SYMPTOMS
VOICE CHANGE: 0
CHEST TIGHTNESS: 0
WHEEZING: 0
ABDOMINAL PAIN: 0
NAUSEA: 0
FACIAL SWELLING: 0
RHINORRHEA: 0
SINUS PAIN: 0
CONSTIPATION: 0
TROUBLE SWALLOWING: 0
DIARRHEA: 0
COUGH: 0
SINUS PRESSURE: 0
VOMITING: 0
BACK PAIN: 1
SHORTNESS OF BREATH: 0
COLOR CHANGE: 0
SORE THROAT: 0

## 2020-02-17 NOTE — PATIENT INSTRUCTIONS
Patient Education        metformin  Pronunciation:  met FOR min  Brand:  Fortamet, Glucophage, Glucophage XR, Glumetza, Riomet  What is the most important information I should know about metformin? You should not use this medicine if you have severe kidney disease, metabolic acidosis, or diabetic ketoacidosis (call your doctor for treatment). If you need to have any type of x-ray or CT scan using a dye that is injected into your veins, you may need to temporarily stop taking metformin. You may develop lactic acidosis, a dangerous build-up of lactic acid in your blood. Call your doctor or get emergency medical help if you have unusual muscle pain, trouble breathing, stomach pain, dizziness, feeling cold, or feeling very weak or tired. What is metformin? Metformin is used together with diet and exercise to improve blood sugar control in adults with type 2 diabetes mellitus. Metformin is sometimes used together with insulin or other medications, but metformin is not for treating type 1 diabetes. Metformin may also be used for purposes not listed in this medication guide. What should I discuss with my healthcare provider before taking metformin? You should not use metformin if you are allergic to it, or if you have:  · severe kidney disease; or  · metabolic acidosis or diabetic ketoacidosis (call your doctor for treatment). If you need to have surgery or any type of x-ray or CT scan using a dye that is injected into your veins, you may need to temporarily stop taking metformin. Be sure your caregivers know ahead of time that you are using this medication. Tell your doctor if you have ever had:  · kidney disease (your kidney function may need to be checked before you take this medicine);  · high ketone levels in your blood or urine;  · heart disease, congestive heart failure;  · liver disease; or  · if you also use insulin, or other oral diabetes medications.   You may develop lactic acidosis, a dangerous severe hypoglycemia. Be sure your family or close friends know how to give you this injection in an emergency. Blood sugar levels can be affected by stress, illness, surgery, exercise, alcohol use, or skipping meals. Ask your doctor before changing your dose or medication schedule. Metformin is only part of a complete treatment program that may also include diet, exercise, weight control, blood sugar testing, and special medical care. Follow your doctor's instructions very closely. Store at room temperature away from moisture, heat, and light. Your doctor may have you take extra vitamin B12 while you are taking metformin. Take only the amount of vitamin B12 that your doctor has prescribed. What happens if I miss a dose? Take the medicine as soon as you can, but skip the missed dose if it is almost time for your next dose. Do not take two doses at one time. What happens if I overdose? Seek emergency medical attention or call the Poison Help line at 1-836.857.9023. An overdose can cause severe hypoglycemia or lactic acidosis. What should I avoid while taking metformin? Avoid drinking alcohol. It lowers blood sugar and may increase your risk of lactic acidosis. What are the possible side effects of metformin? Get emergency medical help if you have signs of an allergic reaction:  hives; difficult breathing; swelling of your face, lips, tongue, or throat. Some people using metformin develop lactic acidosis, which can be fatal. Get emergency medical help if you have even mild symptoms such as:  · unusual muscle pain;  · feeling cold;  · trouble breathing;  · feeling dizzy, light-headed, tired, or very weak;  · stomach pain, vomiting; or  · slow or irregular heart rate. Common side effects may include:  · low blood sugar;  · nausea, upset stomach; or  · diarrhea. This is not a complete list of side effects and others may occur. Call your doctor for medical advice about side effects.  You may report side effects to FDA at 9-874-FKE-1649. What other drugs will affect metformin? Many drugs can affect metformin, making this medicine less effective or increasing your risk of lactic acidosis. This includes prescription and over-the-counter medicines, vitamins, and herbal products. Not all possible interactions are listed here. Tell your doctor about all your current medicines and any medicine you start or stop using. Where can I get more information? Your pharmacist can provide more information about metformin. Remember, keep this and all other medicines out of the reach of children, never share your medicines with others, and use this medication only for the indication prescribed. Every effort has been made to ensure that the information provided by [address] is accurate, up-to-date, and complete, but no guarantee is made to that effect. Drug information contained herein may be time sensitive. [facility] information has been compiled for use by healthcare practitioners and consumers in the [place] and therefore FileThis does not warrant that uses outside of the [place] are appropriate, unless specifically indicated otherwise. [facility]'s drug information does not endorse drugs, diagnose patients or recommend therapy. [employer] drug information is an informational resource designed to assist licensed healthcare practitioners in caring for their patients and/or to serve consumers viewing this service as a supplement to, and not a substitute for, the expertise, skill, knowledge and judgment of healthcare practitioners. The absence of a warning for a given drug or drug combination in no way should be construed to indicate that the drug or drug combination is safe, effective or appropriate for any given patient. [facility] does not assume any responsibility for any aspect of healthcare administered with the aid of information [facility] provides.  The information contained herein is not intended to cover all possible uses, directions, precautions, warnings, drug interactions, allergic reactions, or adverse effects. If you have questions about the drugs you are taking, check with your doctor, nurse or pharmacist.  Copyright 2388-6236 [address]: 17.01. Revision date: 9/10/2019. Care instructions adapted under license by [facility] ([facility]). If you have questions about a medical condition or this instruction, always ask your healthcare professional. [place] [address] any warranty or liability for your use of this information. Monitor BS at different times: 1 day fasting, then next day 2 hours after lunch, next day 2 hours after dinner, next day at bedtime then start over and log all values.   Bring log to next appointment  Foot exam every day; wash and dry well between toes, look for any redness, cracks, wounds notify provider if any problems occur  Reminder for annual Eye exam  Reminder for Podiatry visits Q 2 Months for toenail care if needed  Reminder to keep vaccines up dated  Exercise 30 minutes daily  Recommend Diabetic Education Classes if you have not already attended

## 2020-02-17 NOTE — PROGRESS NOTES
OFFICE PROGRESS NOTE  [address] [place] [address] Rd  [address] [id number]  Dept: [phone]   Chief Complaint   Patient presents with   Syed Walters 55 were done 02/15/2020    Hypertension     Pt took bp medication this morning          HPI:     61 y.o. male presents today for follow-up of diabetes [facility]. Lab 2/15/2020  and A1c 8.6% up from 7.4% in November I stopped the metformin due to diarrhea. Patient reports being compliant to low carbohydrate diet and increasing weekly exercises. Currently, the patient is treated with medication(s): Jardiance 25 mg daily. Patient reports not checking home blood sugar. Patient denies hypoglycemic episodes and understand to take sweetened beverages or a snack if hypoglycemic symptoms are suspected. Lab Results   Component Value Date     02/15/2020    K 4.3 02/15/2020     02/15/2020    CO2 22 02/15/2020    BUN 21 (H) 02/15/2020    CREATININE 1.3 (H) 02/15/2020    GLUCOSE 199 (H) 02/15/2020    CALCIUM 9.3 02/15/2020    PROT 7.0 02/15/2020    LABALBU 4.3 02/15/2020    BILITOT 0.7 02/15/2020    ALKPHOS 111 02/15/2020    AST 32 02/15/2020    ALT 46 (H) 02/15/2020    LABGLOM 56 02/15/2020    GFRAA >60 02/15/2020       Hypertension: Patient here for follow-up of elevated blood pressure. He is exercising and is adherent to low salt diet. Blood pressure is well controlled at home. Cardiac symptoms none. Patient denies chest pain, chest pressure/discomfort, claudication, dyspnea, exertional chest pressure/discomfort, fatigue, irregular heart beat, lower extremity edema, near-syncope, orthopnea, palpitations, paroxysmal nocturnal dyspnea, syncope and tachypnea. Cardiovascular risk factors: advanced age (older than 54 for men, 72 for women), diabetes mellitus, dyslipidemia, hypertension, male gender and obesity (BMI >= 30 kg/m2). Use of agents associated with hypertension: none.  History of target seen at L1-2, L2-3, L3-4 or L5-S1. Traversing L5 nerve roots in the   lateral recess. Today's vital signs are as follows:  /80   Pulse 72   Temp 98 °F (36.7 °C)   Resp 14   Ht 5' 8\" (1.727 m)   Wt 206 lb 3.2 oz (93.5 kg)   SpO2 98%   BMI 31.35 kg/m²     Lab Results   Component Value Date    LABA1C 8.6 (H) 02/15/2020       Pain scale: 0/10      Current Outpatient Medications:     lisinopril (PRINIVIL;ZESTRIL) 10 MG tablet, Take 1 tablet by mouth daily, Disp: 90 tablet, Rfl: 3    atenolol (TENORMIN) 25 MG tablet, TAKE ONE TABLET BY MOUTH ONCE A DAY., Disp: 90 tablet, Rfl: 3    atorvastatin (LIPITOR) 40 MG tablet, Take 1 tablet by mouth daily, Disp: 90 tablet, Rfl: 3    metFORMIN (GLUCOPHAGE-XR) 500 MG extended release tablet, Take 1 tablet by mouth daily (with breakfast), Disp: 30 tablet, Rfl: 5    empagliflozin (JARDIANCE) 25 MG tablet, Take 25 mg by mouth daily, Disp: 30 tablet, Rfl: 6    fluticasone (FLONASE) 50 MCG/ACT nasal spray, 1 spray by Nasal route daily, Disp: 3 Bottle, Rfl: 3    Blood Glucose Monitoring Suppl (ACURA BLOOD GLUCOSE METER) W/DEVICE KIT, 1 Device by Does not apply route daily, Disp: 1 kit, Rfl: 0    glucose blood VI test strips (EXACTECH TEST) strip, 1 each by In Vitro route daily As needed. , Disp: 100 each, Rfl: 3    Multiple Vitamin (MULTI-VITAMIN) TABS, Take 1 tablet by mouth Daily. , Disp: , Rfl:   Social History     Socioeconomic History    Marital status:      Spouse name: None    Number of children: None    Years of education: None    Highest education level: None   Occupational History    None   Social Needs    Financial resource strain: None    Food insecurity:     Worry: None     Inability: None    Transportation needs:     Medical: None     Non-medical: None   Tobacco Use    Smoking status: Former Smoker     Packs/day: 1.50     Years: 25.00     Pack years: 37.50     Last attempt to quit: 2002     Years since quittin.0    non-distended, normal bowel sounds, no masses or hepatosplenomegaly  Musculoskeletal: Normal ROM pain in the left SI joint, + straight leg raise to the left side,  no joint swelling, deformity, no pain or tenderness in the left hip on palpation    Neurologic: reflexes normal and symmetric, no cranial nerve deficit, gait, coordination and speech normal  Extremities: no clubbing, cyanosis, or edema. Psychiatric: Good eye contact, normal mood and affect, answers questions appropriately    Foot Exam:  No signs of infection and/or necrosis noted. Sensation intact and equal bilaterally. Monofilament normal bilateral,  Interdigit spaces exhibit no abnormal skin changes and/or surface growth. ASSESSMENT/PLAN   Kristin Holt was seen today for discuss labs and hypertension. Diagnoses and all orders for this visit:    Type 2 diabetes mellitus without complication, without long-term current use of insulin (HCC) worsening up from 7.4% to 8.6%  -     metFORMIN (GLUCOPHAGE-XR) 500 MG extended release tablet; Take 1 tablet by mouth daily (with breakfast)  Continue Jardiance as directed and will resume Metformin  mg daily and if tolerating it he may need to increase to BID. Monitor BS at different times: 1 day fasting, then next day 2 hours after lunch, next day 2 hours after dinner, next day at bedtime then start over and log all values. Bring log to next appointment  Foot exam every day; wash and dry well between toes, look for any redness, cracks, wounds notify provider if any problems occur  Reminder for annual Eye exam  Reminder for Podiatry visits Q 2 Months for toenail care if needed  Reminder to keep vaccines up dated  Exercise 30 minutes daily  Recommend Diabetic Education Classes if you have not already attended    Essential hypertension well controlled  -     lisinopril (PRINIVIL;ZESTRIL) 10 MG tablet;  Take 1 tablet by mouth daily  -     atenolol (TENORMIN) 25 MG tablet; TAKE ONE TABLET BY MOUTH ONCE A DAY.  -Discussed taking medication and directed every day. -Discussed exercising daily 30 minutes 5 times a week for 150 minutes weekly.  -Discussed weight reduction if needed.  -Discussed low sodium diet.  -Discussed limiting caffeine consumption and tobacco cessation and the effects they have on the heart and blood pressure. Mixed hyperlipidemia well controlled  -     atorvastatin (LIPITOR) 40 MG tablet; Take 1 tablet by mouth daily  -Discussed low fat diet, limit fast food, goodies, breads and pastas if consuming several days a week,  limit any alcohol consumption.  -Discussed weight reduction and exercise 30 minutes 5 days a week for total of 150 minutes weekly.  -Discussed if any unusual muscle aching/pain to contact the office, discussed medication and risk of muscle pain/damage from Rhabdomyolysis. Chronic left hip pain worsening  -     XR LUMBAR SPINE (2-3 VIEWS); Future  -     XR HIP LEFT (2-3 VIEWS); Future    Low back pain radiation to left lower extremity  New    XR LUMBAR SPINE (2-3 VIEWS); Future  -     XR HIP LEFT (2-3 VIEWS); Future  Discussed may need to get injection into the back             Return in about 6 weeks (around 3/30/2020) for DM, back and hip. Discussed weight loss, Discussed exercising 30 minutes daily and Discussed taking medications as directed and adverse effects        I have reviewed my findings and recommendations with Gregory Wheat, NP-C, FNP-BC

## 2020-04-01 ENCOUNTER — OFFICE VISIT (OUTPATIENT)
Dept: FAMILY MEDICINE CLINIC | Age: 60
End: 2020-04-01
Payer: COMMERCIAL

## 2020-04-01 VITALS
OXYGEN SATURATION: 98 % | SYSTOLIC BLOOD PRESSURE: 110 MMHG | HEIGHT: 68 IN | HEART RATE: 58 BPM | BODY MASS INDEX: 30.42 KG/M2 | DIASTOLIC BLOOD PRESSURE: 68 MMHG | RESPIRATION RATE: 18 BRPM | WEIGHT: 200.7 LBS | TEMPERATURE: 98.5 F

## 2020-04-01 PROCEDURE — 82962 GLUCOSE BLOOD TEST: CPT | Performed by: NURSE PRACTITIONER

## 2020-04-01 PROCEDURE — 3052F HG A1C>EQUAL 8.0%<EQUAL 9.0%: CPT | Performed by: NURSE PRACTITIONER

## 2020-04-01 PROCEDURE — 99213 OFFICE O/P EST LOW 20 MIN: CPT | Performed by: NURSE PRACTITIONER

## 2020-04-01 RX ORDER — METFORMIN HYDROCHLORIDE 500 MG/1
500 TABLET, EXTENDED RELEASE ORAL
Qty: 90 TABLET | Refills: 3 | Status: SHIPPED
Start: 2020-04-01 | End: 2020-07-01 | Stop reason: SDUPTHER

## 2020-04-01 RX ORDER — EMPAGLIFLOZIN 25 MG/1
25 TABLET, FILM COATED ORAL DAILY
Qty: 30 TABLET | Refills: 6 | Status: SHIPPED
Start: 2020-04-01 | End: 2020-07-01 | Stop reason: SDUPTHER

## 2020-04-01 ASSESSMENT — ENCOUNTER SYMPTOMS
BACK PAIN: 0
VOMITING: 0
TROUBLE SWALLOWING: 0
WHEEZING: 0
COUGH: 0
CONSTIPATION: 0
RHINORRHEA: 0
SINUS PAIN: 0
FACIAL SWELLING: 0
SINUS PRESSURE: 0
NAUSEA: 0
CHEST TIGHTNESS: 0
ABDOMINAL PAIN: 0
VOICE CHANGE: 0
COLOR CHANGE: 0
SHORTNESS OF BREATH: 0
DIARRHEA: 0
SORE THROAT: 0

## 2020-07-01 ENCOUNTER — OFFICE VISIT (OUTPATIENT)
Dept: FAMILY MEDICINE CLINIC | Age: 60
End: 2020-07-01
Payer: MEDICAID

## 2020-07-01 VITALS
RESPIRATION RATE: 18 BRPM | OXYGEN SATURATION: 99 % | WEIGHT: 202 LBS | TEMPERATURE: 97.1 F | BODY MASS INDEX: 30.62 KG/M2 | HEART RATE: 58 BPM | SYSTOLIC BLOOD PRESSURE: 112 MMHG | DIASTOLIC BLOOD PRESSURE: 72 MMHG | HEIGHT: 68 IN

## 2020-07-01 PROBLEM — M25.70: Status: ACTIVE | Noted: 2020-07-01

## 2020-07-01 LAB
CHP ED QC CHECK: NORMAL
GLUCOSE BLD-MCNC: 170 MG/DL
HBA1C MFR BLD: 8 %

## 2020-07-01 PROCEDURE — 82962 GLUCOSE BLOOD TEST: CPT | Performed by: NURSE PRACTITIONER

## 2020-07-01 PROCEDURE — 83036 HEMOGLOBIN GLYCOSYLATED A1C: CPT | Performed by: NURSE PRACTITIONER

## 2020-07-01 PROCEDURE — 3052F HG A1C>EQUAL 8.0%<EQUAL 9.0%: CPT | Performed by: NURSE PRACTITIONER

## 2020-07-01 PROCEDURE — 3017F COLORECTAL CA SCREEN DOC REV: CPT | Performed by: NURSE PRACTITIONER

## 2020-07-01 PROCEDURE — 1036F TOBACCO NON-USER: CPT | Performed by: NURSE PRACTITIONER

## 2020-07-01 PROCEDURE — G8427 DOCREV CUR MEDS BY ELIG CLIN: HCPCS | Performed by: NURSE PRACTITIONER

## 2020-07-01 PROCEDURE — G8417 CALC BMI ABV UP PARAM F/U: HCPCS | Performed by: NURSE PRACTITIONER

## 2020-07-01 PROCEDURE — 99214 OFFICE O/P EST MOD 30 MIN: CPT | Performed by: NURSE PRACTITIONER

## 2020-07-01 PROCEDURE — 2022F DILAT RTA XM EVC RTNOPTHY: CPT | Performed by: NURSE PRACTITIONER

## 2020-07-01 RX ORDER — NAPROXEN 500 MG/1
500 TABLET ORAL 2 TIMES DAILY WITH MEALS
Qty: 60 TABLET | Refills: 0 | Status: SHIPPED
Start: 2020-07-01 | End: 2021-02-16 | Stop reason: ALTCHOICE

## 2020-07-01 RX ORDER — METFORMIN HYDROCHLORIDE 500 MG/1
500 TABLET, EXTENDED RELEASE ORAL 2 TIMES DAILY WITH MEALS
Qty: 180 TABLET | Refills: 1 | Status: SHIPPED
Start: 2020-07-01 | End: 2020-11-09 | Stop reason: SDUPTHER

## 2020-07-01 RX ORDER — EMPAGLIFLOZIN 25 MG/1
25 TABLET, FILM COATED ORAL DAILY
Qty: 30 TABLET | Refills: 6 | Status: SHIPPED
Start: 2020-07-01 | End: 2020-11-09 | Stop reason: SDUPTHER

## 2020-07-01 ASSESSMENT — ENCOUNTER SYMPTOMS
DIARRHEA: 0
FACIAL SWELLING: 0
ABDOMINAL PAIN: 0
NAUSEA: 0
CONSTIPATION: 0
SORE THROAT: 0
SINUS PAIN: 0
COLOR CHANGE: 0
CHEST TIGHTNESS: 0
VOICE CHANGE: 0
SHORTNESS OF BREATH: 0
VOMITING: 0
RHINORRHEA: 0
COUGH: 0
WHEEZING: 0
SINUS PRESSURE: 0
BACK PAIN: 1
TROUBLE SWALLOWING: 0

## 2020-07-01 NOTE — PROGRESS NOTES
OFFICE PROGRESS NOTE  [address] [place] [address] Rd  [address] [facility] [address] [place] [address]  Dept: [phone]   Chief Complaint   Patient presents with    Diabetes    Hip Pain     left hip x 2months lump on right big toe         HPI:     61 y.o. male presents today for follow-up of diabetes [facility]. In-office bloodsugar is:   Results for POC orders placed in visit on 07/01/20   POCT glycosylated hemoglobin (Hb A1C)   Result Value Ref Range    Hemoglobin A1C 8.0 %   POCT Glucose   Result Value Ref Range    Glucose 170 mg/dL    QC OK? Patient reports being compliant to low carbohydrate diet and increasing weekly exercises. Currently, the patient is treated with medication(s): jardiance 25 mg daily and metformin  mg daily. Patient reports checking home blood sugar 1 - 2  times per day. Patient states home blood sugar ranges from 144- 174 fasting and post prandial 200-210. Patient denies hypoglycemic episodes and understand to take sweetened beverages or a snack if hypoglycemic symptoms are suspected. He is also complaining of continued problems with the left hip but has history of herniated disc at L4-L5, also bulging discs from several years ago. He is having worsening pain in the left side of the lower back which radiates around the hip down the front of the left thigh and he has tingling down the back of the leg to the anterior knee and down the front of the leg to the toes. 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 Nothing really gives him much relief. He did take Naprosyn 500 mg which did help with the pain. His last MRI was in 2017 . In 1991 he had duramorph injection at [facility]. Pain is 6/10 on pain scale but when he does an hour of work goes to 9/10. Location: 200       Exam: XR HIP LEFT (2-3 VIEWS).        Comparison: None.       History: Left hip pain.       Technique: 2 views of the left hip.       Findings:       There is no evidence of fracture or dislocation. There is no   asymmetric joint space narrowing or widening. The visualized pelvis   and sacrum are unremarkable.           Impression   No fracture or dislocation.           READING LOCATION: 200       HISTORY: Chronic left hip pain, back pain.       TECHNIQUE: 4 views of the lumbar spine were obtained. Correlation is   made with the patient's prior MRI of the lumbar spine of 1/30/2017.       FINDINGS: There is no acute compression fracture of the lumbar spine. Degenerative disc and degenerative joint disease is noted at the L4-5   and L5-S1 levels more prominent at the L5-S1 level. No lytic or   blastic lesion is identified. The superior sacroiliac joints are   symmetrical bilaterally.           Impression   1. Degenerative disc and degenerative joint disease at the L4-5 and   L5-S1 levels more prominent at the L5-S1 level. 1. The seminal abnormality is a large disc protrusion/extrusion at   L4-5.   2. It is broad-based and appears to displace the traversing L5 nerve   roots in the lateral recess bilaterally. It is slightly more prominent   on the left. 3. There is mild degeneration and bulging at other levels but no   significant protrusion, extrusion, central canal or foraminal stenosis   seen at L1-2, L2-3, L3-4 or L5-S1. Traversing L5 nerve roots in the   lateral recess. He also has a lump on the right great toe for several months does have history of fracturing the toe several years ago . which is getting worse and now painful to walk on it. He said he used the pumice stone and filed down the callous.        Today's vital signs are as follows:  /72   Pulse 58   Temp 97.1 °F (36.2 °C)   Resp 18   Ht 5' 8\" (1.727 m)   Wt 202 lb (91.6 kg)   SpO2 99%   BMI 30.71 kg/m²     Lab Results   Component Value Date    LABA1C 8.0 07/01/2020         Pain scale:3/10 on foot        Current Outpatient Medications:     metFORMIN (GLUCOPHAGE-XR) 500 MG extended release tablet, Take 1 tablet by mouth 2 times daily (with meals), Disp: 180 tablet, Rfl: 1    empagliflozin (JARDIANCE) 25 MG tablet, Take 25 mg by mouth daily, Disp: 30 tablet, Rfl: 6    naproxen (NAPROSYN) 500 MG tablet, Take 1 tablet by mouth 2 times daily (with meals), Disp: 60 tablet, Rfl: 0    lisinopril (PRINIVIL;ZESTRIL) 10 MG tablet, Take 1 tablet by mouth daily, Disp: 90 tablet, Rfl: 3    atenolol (TENORMIN) 25 MG tablet, TAKE ONE TABLET BY MOUTH ONCE A DAY., Disp: 90 tablet, Rfl: 3    atorvastatin (LIPITOR) 40 MG tablet, Take 1 tablet by mouth daily, Disp: 90 tablet, Rfl: 3    fluticasone (FLONASE) 50 MCG/ACT nasal spray, 1 spray by Nasal route daily, Disp: 3 Bottle, Rfl: 3    Blood Glucose Monitoring Suppl (ACURA BLOOD GLUCOSE METER) W/DEVICE KIT, 1 Device by Does not apply route daily, Disp: 1 kit, Rfl: 0    glucose blood VI test strips (EXACTECH TEST) strip, 1 each by In Vitro route daily As needed. , Disp: 100 each, Rfl: 3    Multiple Vitamin (MULTI-VITAMIN) TABS, Take 1 tablet by mouth Daily. , Disp: , Rfl:   Social History     Socioeconomic History    Marital status:      Spouse name: None    Number of children: None    Years of education: None    Highest education level: None   Occupational History    None   Social Needs    Financial resource strain: None    Food insecurity     Worry: None     Inability: None    Transportation needs     Medical: None     Non-medical: None   Tobacco Use    Smoking status: Former Smoker     Packs/day: 1.50     Years: 25.00     Pack years: 37.50     Last attempt to quit: 2002     Years since quittin.3    Smokeless tobacco: Never Used   Substance and Sexual Activity    Alcohol use: Yes     Comment: Social     Drug use: No    Sexual activity: None   Lifestyle    Physical activity     Days per week: None     Minutes per session: None    Stress: None   Relationships    Social connections     Talks on phone: None     Gets together: None Attends Religion service: None     Active member of club or organization: None     Attends meetings of clubs or organizations: None     Relationship status: None    Intimate partner violence     Fear of current or ex partner: None     Emotionally abused: None     Physically abused: None     Forced sexual activity: None   Other Topics Concern    None   Social History Narrative    None       I have reviewed Rohan's allergies, medications, problem list, medical, social and family history and have updated as needed in the electronic medical record    Review of Systems   Constitutional: Negative for activity change, appetite change, chills, diaphoresis, fatigue, fever and unexpected weight change. HENT: Negative for congestion, dental problem, drooling, ear discharge, ear pain, facial swelling, hearing loss, mouth sores, nosebleeds, postnasal drip, rhinorrhea, sinus pressure, sinus pain, sneezing, sore throat, tinnitus, trouble swallowing and voice change. Eyes: Negative for visual disturbance. Respiratory: Negative for cough, chest tightness, shortness of breath and wheezing. Cardiovascular: Negative for chest pain, palpitations and leg swelling. Gastrointestinal: Negative for abdominal pain, constipation, diarrhea, nausea and vomiting. Endocrine: Negative for cold intolerance, heat intolerance, polydipsia, polyphagia and polyuria. Genitourinary: Negative for difficulty urinating, frequency and urgency. Musculoskeletal: Positive for arthralgias, back pain and gait problem. Negative for joint swelling, myalgias, neck pain and neck stiffness. Skin: Negative for color change, pallor, rash and wound. Allergic/Immunologic: Negative for environmental allergies, food allergies and immunocompromised state. Neurological: Negative for dizziness, tremors, seizures, syncope, facial asymmetry, speech difficulty, weakness, light-headedness, numbness ( left leg tingling) and headaches.    Hematological: Negative for adenopathy. Does not bruise/bleed easily. Psychiatric/Behavioral: Negative for agitation, behavioral problems, confusion, decreased concentration, dysphoric mood, hallucinations, self-injury, sleep disturbance and suicidal ideas. The patient is not nervous/anxious and is not hyperactive. OBJECTIVE:     VS:  Wt Readings from Last 3 Encounters:   07/01/20 202 lb (91.6 kg)   04/01/20 200 lb 11.2 oz (91 kg)   02/17/20 206 lb 3.2 oz (93.5 kg)                       Vitals:    07/01/20 0904   BP: 112/72   Pulse: 58   Resp: 18   Temp: 97.1 °F (36.2 °C)   SpO2: 99%   Weight: 202 lb (91.6 kg)   Height: 5' 8\" (1.727 m)       General: Alert and oriented to person, place, and time, well developed and well nourished, in no acute distress  SKIN: Warm and dry, intact without any rash, masses or lesions  HEAD: normocephalic, atraumatic  Eyes: sclera/conjunctiva clear, PERRLA, EOMI's intact  ENT: tympanic membranes, external ear and ear canal normal bilaterally, normal hearing, Nose without deformity, nasal mucosa and turbinates normal without polyps     Neck: supple and non-tender without mass, trachea midline, no cervical lymphadenopathy, no bruit, no thyromegaly or nodules  Cardiovascular: regular rate and regular rhythm, normal S1 and S2,  no murmurs, rubs, clicks, or gallop. Distal pulses intact, no carotid bruits. No edema  Pulmonary/Chest: clear to auscultation bilaterally, no wheezes, rales or rhonchi, normal air movement, no respiratory distress  Abdomen: soft, non-tender, non-distended, normal bowel sounds, no masses or hepatosplenomegaly  Musculoskeletal: Normal ROM with tenderness along the left lower back over the SI joint and L4-L5 , no joint swelling, deformity. There is a dime sized firm mass at the medial aspect of the right great toe    Neurologic: reflexes normal and symmetric, no cranial nerve deficit, gait, coordination and speech normal  Extremities: no clubbing, cyanosis, or edema. Psychiatric: Good eye contact, normal mood and affect, answers questions appropriately    ASSESSMENT/PLAN   Maeola Hodgkins was seen today for diabetes and hip pain. Diagnoses and all orders for this visit:    Type 2 diabetes mellitus without complication, without long-term current use of insulin ([facility]) improving down from 8.6% to 8.0%  -     POCT glycosylated hemoglobin (Hb A1C)  -     POCT Glucose  -  Increase the metFORMIN (GLUCOPHAGE-XR) 500 MG extended release tablet; Take 1 tablet by mouth 2 times daily (with meals)  -     empagliflozin (JARDIANCE) 25 MG tablet; Take 25 mg by mouth daily  Monitor BS at different times: 1 day fasting, then next day 2 hours after lunch, next day 2 hours after dinner, next day at bedtime then start over and log all values. Bring log to next appointment  Foot exam every day; wash and dry well between toes, look for any redness, cracks, wounds notify provider if any problems occur  Reminder for annual Eye exam  Reminder for Podiatry visits Q 2 Months for toenail care if needed  Reminder to keep vaccines up dated  Exercise 30 minutes daily  Recommend Diabetic Education Classes if you have not already attended    Low back pain radiating to left lower extremity worsening  -     Amb External Referral To Physical Therapy  -     naproxen (NAPROSYN) 500 MG tablet; Take 1 tablet by mouth 2 times daily (with meals)  Discussed he will need to complete 6 weeks of therapy prior to MRI he would like to go to [place] on [address]. Chronic left hip pain worsening  -     Amb External Referral To Physical Therapy  -     naproxen (NAPROSYN) 500 MG tablet; Take 1 tablet by mouth 2 times daily (with meals)  Discussed most recent imaging is normal so most likely coming from his back. Bone callus New  -     Ambulatory referral to Podiatry                Return in about 3 months (around 10/1/2020) for DM, HTN, hyperlipidemia.      Discussed weight loss, Discussed exercising 30 minutes daily and Discussed taking medications as directed and adverse effects        I have reviewed my findings and recommendations with Mariely Del Toro.     Trinity Bowden, NP-C, FNP-BC

## 2020-07-01 NOTE — PATIENT INSTRUCTIONS
Patient Education        Learning About Relief for Back Pain  What is back strain? Back strain is an injury that happens when you overstretch, or pull, a muscle in your back. You may hurt your back in an accident or when you exercise or lift something. Most back pain gets better with rest and time. You can take care of yourself at home to help your back heal.  What can you do first to relieve back pain? When you first feel back pain, try these steps:  · Walk. Take a short walk (10 to 20 minutes) on a level surface (no slopes, hills, or stairs) every 2 to 3 hours. Walk only distances you can manage without pain, especially leg pain. · Relax. Find a comfortable position for rest. Some people are comfortable on the floor or a medium-firm bed with a small pillow under their head and another under their knees. Some people prefer to lie on their side with a pillow between their knees. Don't stay in one position for too long. · Try heat or ice. Try using a heating pad on a low or medium setting, or take a warm shower, for 15 to 20 minutes every 2 to 3 hours. Or you can buy single-use heat wraps that last up to 8 hours. You can also try an ice pack for 10 to 15 minutes every 2 to 3 hours. You can use an ice pack or a bag of frozen vegetables wrapped in a thin towel. There is not strong evidence that either heat or ice will help, but you can try them to see if they help. You may also want to try switching between heat and cold. · Take pain medicine exactly as directed. ? If the doctor gave you a prescription medicine for pain, take it as prescribed. ? If you are not taking a prescription pain medicine, ask your doctor if you can take an over-the-counter medicine. What else can you do? · Stretch and exercise. Exercises that increase flexibility may relieve your pain and make it easier for your muscles to keep your spine in a good, neutral position. And don't forget to keep walking. · Do self-massage.  You can for yourself at home? · Sit or lie in positions that are most comfortable and that reduce your pain. Try one of these positions when you lie down:  ? Lie on your back with your knees bent and supported by large pillows. ? Lie on the floor with your legs on the seat of a sofa or chair. ? Lie on your side with your knees and hips bent and a pillow between your legs. ? Lie on your stomach if it does not make pain worse. · Do not sit up in bed, and avoid soft couches and twisted positions. Bed rest can help relieve pain at first, but it delays healing. Avoid bed rest after the first day. · Change positions every 30 minutes. If you must sit for long periods of time, take breaks from sitting. Get up and walk around, or lie flat. · Try using a heating pad on a low or medium setting, for 15 to 20 minutes every 2 or 3 hours. Try a warm shower in place of one session with the heating pad. You can also buy single-use heat wraps that last up to 8 hours. You can also try ice or cold packs on your back for 10 to 20 minutes at a time, several times a day. (Put a thin cloth between the ice pack and your skin.) This reduces pain and makes it easier to be active and exercise. · Take pain medicines exactly as directed. ? If the doctor gave you a prescription medicine for pain, take it as prescribed. ? If you are not taking a prescription pain medicine, ask your doctor if you can take an over-the-counter medicine. When should you call for help? [id number] anytime you think you may need emergency care. For example, call if:  · You are unable to move a leg at all. · You have back pain with severe belly pain. · You have symptoms of a heart attack. These may include:  ? Chest pain or pressure, or a strange feeling in the chest.  ? Sweating. ? Shortness of breath. ? Nausea or vomiting. ? Pain, pressure, or a strange feeling in the back, neck, jaw, or upper belly or in one or both shoulders or arms.   ? Lightheadedness or sudden weakness. ? A fast or irregular heartbeat. After you call 911, the  may tell you to chew 1 adult-strength or 2 to 4 low-dose aspirin. Wait for an ambulance. Do not try to drive yourself. Call your doctor now or seek immediate medical care if:  · You have new or worse symptoms in your arms, legs, chest, belly, or buttocks. Symptoms may include:  ? Numbness or tingling. ? Weakness. ? Pain. · You lose bladder or bowel control. · You have back pain and:  ? You have injured your back while lifting or doing some other activity. Call if the pain is severe, has not gone away after 1 or 2 days, and you cannot do your normal daily activities. ? You have had a back injury before that needed treatment. ? Your pain has lasted longer than 4 weeks. ? You have had weight loss you cannot explain. ? You have a fever. ? You are age 48 or older. ? You have cancer now or have had it before. Watch closely for changes in your health, and be sure to contact your doctor if you are not getting better as expected. Where can you learn more? Go to https://Grow.[employer]. org and sign in to your [employer] account. Enter U040 in the [facility] box to learn more about \"Back Pain, Emergency or Urgent Symptoms: Care Instructions. \"     If you do not have an account, please click on the \"Sign Up Now\" link. Current as of: June 26, 2019               Content Version: 12.5  © 0112-2143 Healthwise, Incorporated. Care instructions adapted under license by [facility] [employer] [facility] ([facility]). If you have questions about a medical condition or this instruction, always ask your healthcare professional. [place] [address] any warranty or liability for your use of this information.

## 2020-07-09 ENCOUNTER — OFFICE VISIT (OUTPATIENT)
Dept: PODIATRY | Age: 60
End: 2020-07-09
Payer: MEDICAID

## 2020-07-09 VITALS — WEIGHT: 202 LBS | BODY MASS INDEX: 30.62 KG/M2 | HEIGHT: 68 IN

## 2020-07-09 PROBLEM — L84 CORNS AND CALLUS: Status: ACTIVE | Noted: 2020-07-09

## 2020-07-09 PROBLEM — M89.8X7 EXOSTOSIS OF TOE: Status: ACTIVE | Noted: 2020-07-09

## 2020-07-09 PROCEDURE — 99243 OFF/OP CNSLTJ NEW/EST LOW 30: CPT | Performed by: PODIATRIST

## 2020-07-09 PROCEDURE — G8417 CALC BMI ABV UP PARAM F/U: HCPCS | Performed by: PODIATRIST

## 2020-07-09 PROCEDURE — G8427 DOCREV CUR MEDS BY ELIG CLIN: HCPCS | Performed by: PODIATRIST

## 2020-07-09 NOTE — Clinical Note
[address], [place] Phone: [phone] Fax: [id number], [address]   Cr Osborn <[id number]>  1960 7/9/2020   Dear Chris Burks,  I would like to thank you for the kind referral of Cr Osborn. He presented to the office today for evaluation regarding painful corn/callus to his right great toe. Patient had previous fracture right great toe several years ago. We did discuss x-rays today, which did reveal an exostosis distal phalanx right great toe. We did discuss both conservative versus surgical intervention for treatment. At this time we will maintain conservative treatment options. We will have continued follow-up in the future if additional Podiatric issues arise. If you should have any questions concerning his visit today, please do not hesitate to contact me.   Sincerely,  Kelly Nix DPM

## 2020-07-09 NOTE — PROGRESS NOTES
20     [facility]    : 1960 Sex: male   Age: 61 y.o. Patient was referred by: NAS Wilson  Patient's PCP/Provider is:  SUZIE Ascencio CNP    Subjective:    Patient is seen today for evaluation regarding painful corn/callus to his right great toe. Chief Complaint   Patient presents with    Toe Pain     right great toe       HPI: Patient stated that he has had this issue for several years and it has progressively gotten worse due to his work boots and activities. He has tried OTC remedies and self debridement himself in the past.  He wanted to discuss potential treatment options available at this time. No other additional issues noted at this time. ROS:  Const: Positives and pertinent negatives as per HPI. Musculo: Denies symptoms other than stated above. Neuro: Denies symptoms other than stated above. Skin: Denies symptoms other than stated above.     Current Medications:    Current Outpatient Medications:     metFORMIN (GLUCOPHAGE-XR) 500 MG extended release tablet, Take 1 tablet by mouth 2 times daily (with meals), Disp: 180 tablet, Rfl: 1    empagliflozin (JARDIANCE) 25 MG tablet, Take 25 mg by mouth daily, Disp: 30 tablet, Rfl: 6    naproxen (NAPROSYN) 500 MG tablet, Take 1 tablet by mouth 2 times daily (with meals), Disp: 60 tablet, Rfl: 0    lisinopril (PRINIVIL;ZESTRIL) 10 MG tablet, Take 1 tablet by mouth daily, Disp: 90 tablet, Rfl: 3    atenolol (TENORMIN) 25 MG tablet, TAKE ONE TABLET BY MOUTH ONCE A DAY., Disp: 90 tablet, Rfl: 3    atorvastatin (LIPITOR) 40 MG tablet, Take 1 tablet by mouth daily, Disp: 90 tablet, Rfl: 3    fluticasone (FLONASE) 50 MCG/ACT nasal spray, 1 spray by Nasal route daily, Disp: 3 Bottle, Rfl: 3    Blood Glucose Monitoring Suppl (ACURA BLOOD GLUCOSE METER) W/DEVICE KIT, 1 Device by Does not apply route daily, Disp: 1 kit, Rfl: 0    glucose blood VI test strips (EXACTECH TEST) strip, 1 each by In Vitro route daily As needed. , Disp: 100 each, Rfl: 3    Multiple Vitamin (MULTI-VITAMIN) TABS, Take 1 tablet by mouth Daily. , Disp: , Rfl:     Allergies: Allergies   Allergen Reactions    Lopid [Gemfibrozil] Other (See Comments)     Enlarged prostate       Vitals:    20 0801   Weight: 202 lb (91.6 kg)   Height: 5' 8\" (1.727 m)        Past Medical History:   Diagnosis Date    Cervical radiculopathy     Bilateral upper extemities    Degeneration, intervertebral disc, cervical     C4-C5 through C6-C7 levels    Diabetes mellitus (HCC)     Headache(784.0)     Hyperlipidemia     Hypertension     Low back pain radiating to left lower extremity 2020    Peptic ulcer 2020    Spinal stenosis     Congenital     Type 2 diabetes mellitus without complication, without long-term current use of insulin ([facility] [[email or web address]]ca 75.) 8/15/2016     Family History   Problem Relation Age of Onset    Hypertension Mother     Hypertension Father     Cancer Father         Prostate    Heart Surgery Father     Heart Disease Father     High Cholesterol Sister     High Cholesterol Brother     Heart Disease Brother      Past Surgical History:   Procedure Laterality Date    ANUS SURGERY  2013    Excision Rianna-anal condyloma, anoscopy    BACK SURGERY      epidural    CHOLECYSTECTOMY, LAPAROSCOPIC  2010    LYMPH NODE BIOPSY      benign     Social History     Tobacco Use    Smoking status: Former Smoker     Packs/day: 1.50     Years: 25.00     Pack years: 37.50     Last attempt to quit: 2002     Years since quittin.4    Smokeless tobacco: Never Used   Substance Use Topics    Alcohol use: Yes     Comment: Social     Drug use: No           Diagnostic studies:    Xr Foot Right (min 3 Views)    Result Date: 2020  EXAMINATION: THREE XRAY VIEWS OF THE RIGHT FOOT 2020 8:08 am COMPARISON: None. HISTORY: ORDERING SYSTEM PROVIDED HISTORY: Right foot pain FINDINGS: Mild osteoarthritic changes of the 1st MTP joint.   Foot alignment is maintained. No acute fracture. Soft tissue structures are unremarkable. Minimal calcaneal spur. Mild osteoarthritic changes. No acute abnormality. Procedures:    None    Exam:  VASCULAR: Pedal pulses palpable right foot. Capillary fill time brisk digits 1 through 5 right foot. NEUROLOGICAL: Epicritic sensations intact digital regions right foot. DERMATOLOGICAL: Hyperkeratotic area noted to the plantar medial right great toe IPJ region. No underlying ulcerative area noted after partial thickness, non-excisional debridement. No edema, ecchymosis, abrasions, or any signs of infection noted right great toe. MUSCULOSKELETAL: Hallux interphalangeus deformity noted from previous toe fracture several years ago. Adequate range of motion right great toe IPJ and MTPJ region. Plan Per Assessment  [facility] was seen today for toe pain. Diagnoses and all orders for this visit:    Exostosis of toe    Corns and callus    Right foot pain  -     XR FOOT RIGHT (MIN 3 VIEWS); Future        1. Consultation and management  2. X-ray studies were reviewed with patient in detail today. 3. We did discuss both conservative options including topical moisturizing cream, use of OTC insoles with all existing shoe gear to prevent reoccurrence of corn/callus. We also did discuss surgical intervention regarding the exostosis present distal phalanx right great toe. 4. Patient will be followed up at a later date to discuss further treatment recommendations. He was advised to continue with his good supportive shoe gear in the interim, and advised on appropriate skin care techniques as well. Seen By:    Chris Live DPM    Electronically signed by Chris Live DPM on 7/9/2020 at 8:31 AM      This note was created using voice recognition software. The note was reviewed however may contain grammatical errors.

## 2020-07-09 NOTE — PROGRESS NOTES
Patient is in today for evaluation of right foot/right great toe pain. Patient says his toe has been bothering him for about a year with increased pain the past few months.  pcp is USZIE Bahena - CNP  Last ov 7/1/2020

## 2020-09-10 ENCOUNTER — TELEPHONE (OUTPATIENT)
Dept: ADMINISTRATIVE | Age: 60
End: 2020-09-10

## 2020-09-30 ENCOUNTER — OFFICE VISIT (OUTPATIENT)
Dept: FAMILY MEDICINE CLINIC | Age: 60
End: 2020-09-30
Payer: MEDICAID

## 2020-09-30 VITALS
BODY MASS INDEX: 30.62 KG/M2 | SYSTOLIC BLOOD PRESSURE: 128 MMHG | HEIGHT: 68 IN | WEIGHT: 202 LBS | DIASTOLIC BLOOD PRESSURE: 80 MMHG | OXYGEN SATURATION: 100 % | TEMPERATURE: 98 F | RESPIRATION RATE: 24 BRPM | HEART RATE: 57 BPM

## 2020-09-30 PROCEDURE — 3017F COLORECTAL CA SCREEN DOC REV: CPT | Performed by: NURSE PRACTITIONER

## 2020-09-30 PROCEDURE — 99214 OFFICE O/P EST MOD 30 MIN: CPT | Performed by: NURSE PRACTITIONER

## 2020-09-30 PROCEDURE — 1036F TOBACCO NON-USER: CPT | Performed by: NURSE PRACTITIONER

## 2020-09-30 PROCEDURE — G8417 CALC BMI ABV UP PARAM F/U: HCPCS | Performed by: NURSE PRACTITIONER

## 2020-09-30 PROCEDURE — G8427 DOCREV CUR MEDS BY ELIG CLIN: HCPCS | Performed by: NURSE PRACTITIONER

## 2020-09-30 PROCEDURE — 93000 ELECTROCARDIOGRAM COMPLETE: CPT | Performed by: NURSE PRACTITIONER

## 2020-09-30 ASSESSMENT — ENCOUNTER SYMPTOMS
FACIAL SWELLING: 0
VOMITING: 0
SINUS PRESSURE: 0
SINUS PAIN: 0
NAUSEA: 0
VOICE CHANGE: 0
RHINORRHEA: 0
DIARRHEA: 0
SORE THROAT: 0
ABDOMINAL PAIN: 0
WHEEZING: 0
COLOR CHANGE: 0
COUGH: 1
BACK PAIN: 1
CONSTIPATION: 0
TROUBLE SWALLOWING: 0
CHEST TIGHTNESS: 0
SHORTNESS OF BREATH: 1

## 2020-09-30 NOTE — PATIENT INSTRUCTIONS
Patient Education        Deciding About an MRI for Low Back Pain  Deciding about magnetic resonance imaging (MRI) for low back pain: Overview  What is an MRI? An MRI is a test that uses a magnetic field and pulses of radio wave energy to make pictures of your spine. MRI stands for \"magnetic resonance imaging. \"  For this test, your body is placed inside a special machine that contains a strong magnet. In some cases, a contrast material is used during the MRI scan. This means that you have a chemical injected into your bloodstream, through a vein (IV). The chemical makes certain areas show up better on the MRI pictures. What are enamorado points about this decision? · An MRI is not a standard test for finding the cause of low back pain. A physical exam that includes questions about your medical history is enough to diagnose and treat most cases. · Since most low back pain gets better on its own, it is often best to wait and see if you get better with time. · You may need an MRI right away if your doctor suspects that disease or nerve damage is causing your pain. · MRIs are expensive. Health insurance may cover only part of the cost.  Why might you choose an MRI? · You have had low back pain for at least 6 weeks, and home treatment (pain relievers, exercise, and heat or ice) has not helped. · You are not worried about the cost of an MRI. · You are willing to have surgery if the MRI shows a problem that can be fixed with surgery. Why might you choose not to have an MRI? · A physical exam that includes questions about your medical history is all that is needed to diagnose most cases of low back pain. · An MRI can be done later if treatment is not working. · You are not willing to have surgery even if an MRI showed a problem that surgery could fix. Your decision  Thinking about the facts and your feelings can help you make a decision that is right for you.  Be sure you understand the benefits and risks of your options, and think about what else you need to do before you make the decision. Where can you learn more? Go to https://chpepiceweb.[facility]. org and sign in to your vSocial account. Enter X240 in the [facility] box to learn more about \"Deciding About an MRI for Low Back Pain. \"     If you do not have an account, please click on the \"Sign Up Now\" link. Current as of: March 2, 2020               Content Version: 12.5  © 2006-2020 Healthwise, Incorporated. Care instructions adapted under license by [facility] ([facility]). If you have questions about a medical condition or this instruction, always ask your healthcare professional. [place] [address] any warranty or liability for your use of this information. Patient advised regarding steps to help prevent the spread of COVID-19   SOURCE - https://franko-lety.info/. html      1-Stay home except to get medical care  2-Clean your hands often for atleast 20 seconds, avoid touching: Avoid touching your eyes, nose, and mouth with unwashed hands. 3-Seek medical attention: Seek prompt medical attention if your illness is worsening (e.g., difficulty breathing).   Call you doctor first.  3-Wear a facemask if you are sick               4-Cover your coughs and sneezes

## 2020-09-30 NOTE — PROGRESS NOTES
OFFICE PROGRESS NOTE  [address]  1932 Varsha Gomez [address] [place] [id number]  Dept: [phone]   Chief Complaint   Patient presents with    Chest Pain    Shortness of Breath    Cough         HPI:     Here today for acute onset of chest pain, SOB and fatigue he had mid sternal chest pain started yesterday between 9 - 10 am lasted all day until he went to bed last night. The worse time was the 30 minutes he was driving home from work. He thought about going to the ER but didn't. He had trouble taking a deep breath. He is having SOB which started yesterday he still has SOB today but not like yesterday. He also started with a wet cough but not actually brining anything up for the last week, he has also had some postnasal drip and sinus drainage for the last week as well. He denies any fever, chills, no change in appetite. He is not having any chest pain today. He denies any nausea, no radiation, no diaphoresis. He does see cardiologist Dr Blaise Hanson but can't remember how long its been. He denies any GERD symptoms . He completed 6 weeks of PT for his back and is feeling a little better. Still having problems with the hips. Would like to have MRI now if possible. He said on Sunday it was bad but he took the Naprosyn and felt better. He has continued problems with the left hip but has history of herniated disc at L4-L5, also bulging discs from several years ago. He is having worsening pain in the left side of the lower back which radiates around the hip down the front of the left thigh and he has tingling down the back of the leg to the anterior knee and down the front of the leg to the toes. 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 Nothing really gives him much relief. He did take Naprosyn 500 mg which did help with the pain. His last MRI was in 2017 . In 1991 he had duramorph injection at [facility].        Today's vital signs are as follows:  /80 (Site: Left Upper Arm, Position: Sitting, Cuff Size: Large Adult)   Pulse 57   Temp 98 °F (36.7 °C) (Temporal)   Resp 24   Ht 5' 8\" (1.727 m)   Wt 202 lb (91.6 kg)   SpO2 100%   BMI 30.71 kg/m²     Lab Results   Component Value Date    LABA1C 8.0 07/01/2020       Pain scale: 2/10 in lower back. Current Outpatient Medications:     metFORMIN (GLUCOPHAGE-XR) 500 MG extended release tablet, Take 1 tablet by mouth 2 times daily (with meals), Disp: 180 tablet, Rfl: 1    empagliflozin (JARDIANCE) 25 MG tablet, Take 25 mg by mouth daily, Disp: 30 tablet, Rfl: 6    naproxen (NAPROSYN) 500 MG tablet, Take 1 tablet by mouth 2 times daily (with meals) (Patient taking differently: Take 500 mg by mouth as needed ), Disp: 60 tablet, Rfl: 0    lisinopril (PRINIVIL;ZESTRIL) 10 MG tablet, Take 1 tablet by mouth daily, Disp: 90 tablet, Rfl: 3    atenolol (TENORMIN) 25 MG tablet, TAKE ONE TABLET BY MOUTH ONCE A DAY., Disp: 90 tablet, Rfl: 3    atorvastatin (LIPITOR) 40 MG tablet, Take 1 tablet by mouth daily, Disp: 90 tablet, Rfl: 3    fluticasone (FLONASE) 50 MCG/ACT nasal spray, 1 spray by Nasal route daily, Disp: 3 Bottle, Rfl: 3    Blood Glucose Monitoring Suppl (ACURA BLOOD GLUCOSE METER) W/DEVICE KIT, 1 Device by Does not apply route daily, Disp: 1 kit, Rfl: 0    glucose blood VI test strips (EXACTECH TEST) strip, 1 each by In Vitro route daily As needed. , Disp: 100 each, Rfl: 3    Multiple Vitamin (MULTI-VITAMIN) TABS, Take 1 tablet by mouth Daily. , Disp: , Rfl:   Social History     Socioeconomic History    Marital status:      Spouse name: None    Number of children: None    Years of education: None    Highest education level: None   Occupational History    None   Social Needs    Financial resource strain: None    Food insecurity     Worry: None     Inability: None    Transportation needs     Medical: None     Non-medical: None   Tobacco Use    Smoking status: Former Smoker     Packs/day: 1.50     Years: 25.00     Pack years: 37.50     Last attempt to quit: 2002     Years since quittin.6    Smokeless tobacco: Never Used   Substance and Sexual Activity    Alcohol use: Yes     Comment: Social     Drug use: No    Sexual activity: None   Lifestyle    Physical activity     Days per week: None     Minutes per session: None    Stress: None   Relationships    Social connections     Talks on phone: None     Gets together: None     Attends Church service: None     Active member of club or organization: None     Attends meetings of clubs or organizations: None     Relationship status: None    Intimate partner violence     Fear of current or ex partner: None     Emotionally abused: None     Physically abused: None     Forced sexual activity: None   Other Topics Concern    None   Social History Narrative    None       I have reviewed Rohan's allergies, medications, problem list, medical, social and family history and have updated as needed in the electronic medical record    Review of Systems   Constitutional: Negative for activity change, appetite change, chills, diaphoresis, fatigue, fever and unexpected weight change. HENT: Negative for congestion, dental problem, drooling, ear discharge, ear pain, facial swelling, hearing loss, mouth sores, nosebleeds, postnasal drip, rhinorrhea, sinus pressure, sinus pain, sneezing, sore throat, tinnitus, trouble swallowing and voice change. Eyes: Negative for visual disturbance. Respiratory: Positive for cough and shortness of breath. Negative for chest tightness and wheezing. Cardiovascular: Positive for chest pain. Negative for palpitations and leg swelling. Gastrointestinal: Negative for abdominal pain, constipation, diarrhea, nausea and vomiting. Endocrine: Negative for cold intolerance, heat intolerance, polydipsia, polyphagia and polyuria. Genitourinary: Negative for difficulty urinating, frequency and urgency. Musculoskeletal: Positive for back pain. Negative for arthralgias, gait problem, joint swelling, myalgias, neck pain and neck stiffness. Skin: Negative for color change, pallor, rash and wound. Allergic/Immunologic: Negative for environmental allergies, food allergies and immunocompromised state. Neurological: Positive for numbness. Negative for dizziness, tremors, seizures, syncope, facial asymmetry, speech difficulty, weakness, light-headedness and headaches. Hematological: Negative for adenopathy. Does not bruise/bleed easily. Psychiatric/Behavioral: Negative for agitation, behavioral problems, confusion, decreased concentration, dysphoric mood, hallucinations, self-injury, sleep disturbance and suicidal ideas. The patient is not nervous/anxious and is not hyperactive. OBJECTIVE:     VS:  Wt Readings from Last 3 Encounters:   09/30/20 202 lb (91.6 kg)   07/09/20 202 lb (91.6 kg)   07/01/20 202 lb (91.6 kg)                       Vitals:    09/30/20 0727 09/30/20 0749   BP: (!) 148/70 128/80   Site:  Left Upper Arm   Position:  Sitting   Cuff Size:  Large Adult   Pulse: 57    Resp: 24    Temp: 98 °F (36.7 °C)    TempSrc: Temporal    SpO2: 100%    Weight: 202 lb (91.6 kg)    Height: 5' 8\" (1.727 m)        General: Alert and oriented to person, place, and time, well developed and well nourished, in no acute distress  SKIN: Warm and dry, intact without any rash, masses or lesions  HEAD: normocephalic, atraumatic  Eyes: sclera/conjunctiva clear, PERRLA, EOMI's intact  Neck: supple and non-tender without mass, trachea midline, no cervical lymphadenopathy, no bruit, no thyromegaly or nodules  Cardiovascular: regular rate and regular rhythm, normal S1 and S2,  no murmurs, rubs, clicks, or gallop. Distal pulses intact, no carotid bruits.  No edema  Pulmonary/Chest: clear to auscultation bilaterally, no wheezes, rales or rhonchi, normal air movement, no respiratory distress  Abdomen: soft, non-tender,

## 2020-11-06 ENCOUNTER — OFFICE VISIT (OUTPATIENT)
Dept: FAMILY MEDICINE CLINIC | Age: 60
End: 2020-11-06
Payer: MEDICAID

## 2020-11-06 VITALS
TEMPERATURE: 97.8 F | HEIGHT: 66 IN | HEART RATE: 57 BPM | WEIGHT: 202 LBS | OXYGEN SATURATION: 99 % | SYSTOLIC BLOOD PRESSURE: 132 MMHG | RESPIRATION RATE: 18 BRPM | BODY MASS INDEX: 32.47 KG/M2 | DIASTOLIC BLOOD PRESSURE: 80 MMHG

## 2020-11-06 DIAGNOSIS — E11.9 TYPE 2 DIABETES MELLITUS WITHOUT COMPLICATION, WITHOUT LONG-TERM CURRENT USE OF INSULIN (HCC): ICD-10-CM

## 2020-11-06 LAB
ALBUMIN SERPL-MCNC: 4.8 G/DL (ref 3.5–5.2)
ALP BLD-CCNC: 100 U/L (ref 40–129)
ALT SERPL-CCNC: 41 U/L (ref 0–40)
ANION GAP SERPL CALCULATED.3IONS-SCNC: 18 MMOL/L (ref 7–16)
AST SERPL-CCNC: 21 U/L (ref 0–39)
BILIRUB SERPL-MCNC: 0.5 MG/DL (ref 0–1.2)
BUN BLDV-MCNC: 17 MG/DL (ref 8–23)
CALCIUM SERPL-MCNC: 9.6 MG/DL (ref 8.6–10.2)
CHLORIDE BLD-SCNC: 104 MMOL/L (ref 98–107)
CHP ED QC CHECK: NORMAL
CO2: 20 MMOL/L (ref 22–29)
CREAT SERPL-MCNC: 1.2 MG/DL (ref 0.7–1.2)
CREATININE URINE POCT: 50
GFR AFRICAN AMERICAN: >60
GFR NON-AFRICAN AMERICAN: >60 ML/MIN/1.73
GLUCOSE BLD-MCNC: 165 MG/DL (ref 74–99)
GLUCOSE BLD-MCNC: 237 MG/DL
HBA1C MFR BLD: 8.4 %
MICROALBUMIN/CREAT 24H UR: 10 MG/G{CREAT}
MICROALBUMIN/CREAT UR-RTO: ABNORMAL
POTASSIUM SERPL-SCNC: 4.7 MMOL/L (ref 3.5–5)
SODIUM BLD-SCNC: 142 MMOL/L (ref 132–146)
TOTAL PROTEIN: 7.4 G/DL (ref 6.4–8.3)

## 2020-11-06 PROCEDURE — 3052F HG A1C>EQUAL 8.0%<EQUAL 9.0%: CPT | Performed by: NURSE PRACTITIONER

## 2020-11-06 PROCEDURE — G8427 DOCREV CUR MEDS BY ELIG CLIN: HCPCS | Performed by: NURSE PRACTITIONER

## 2020-11-06 PROCEDURE — G8417 CALC BMI ABV UP PARAM F/U: HCPCS | Performed by: NURSE PRACTITIONER

## 2020-11-06 PROCEDURE — 83036 HEMOGLOBIN GLYCOSYLATED A1C: CPT | Performed by: NURSE PRACTITIONER

## 2020-11-06 PROCEDURE — G8484 FLU IMMUNIZE NO ADMIN: HCPCS | Performed by: NURSE PRACTITIONER

## 2020-11-06 PROCEDURE — 99214 OFFICE O/P EST MOD 30 MIN: CPT | Performed by: NURSE PRACTITIONER

## 2020-11-06 PROCEDURE — 3017F COLORECTAL CA SCREEN DOC REV: CPT | Performed by: NURSE PRACTITIONER

## 2020-11-06 PROCEDURE — 90694 VACC AIIV4 NO PRSRV 0.5ML IM: CPT | Performed by: NURSE PRACTITIONER

## 2020-11-06 PROCEDURE — 2022F DILAT RTA XM EVC RTNOPTHY: CPT | Performed by: NURSE PRACTITIONER

## 2020-11-06 PROCEDURE — 82962 GLUCOSE BLOOD TEST: CPT | Performed by: NURSE PRACTITIONER

## 2020-11-06 PROCEDURE — 82044 UR ALBUMIN SEMIQUANTITATIVE: CPT | Performed by: NURSE PRACTITIONER

## 2020-11-06 PROCEDURE — 1036F TOBACCO NON-USER: CPT | Performed by: NURSE PRACTITIONER

## 2020-11-06 PROCEDURE — 90471 IMMUNIZATION ADMIN: CPT | Performed by: NURSE PRACTITIONER

## 2020-11-06 RX ORDER — ZOSTER VACCINE RECOMBINANT, ADJUVANTED 50 MCG/0.5
0.5 KIT INTRAMUSCULAR SEE ADMIN INSTRUCTIONS
Qty: 0.5 ML | Refills: 1 | Status: SHIPPED | OUTPATIENT
Start: 2020-11-06 | End: 2021-05-05

## 2020-11-06 ASSESSMENT — ENCOUNTER SYMPTOMS
COLOR CHANGE: 0
WHEEZING: 0
VOMITING: 0
RHINORRHEA: 0
NAUSEA: 0
SINUS PRESSURE: 0
COUGH: 0
BACK PAIN: 1
SINUS PAIN: 0
SORE THROAT: 0
VOICE CHANGE: 0
FACIAL SWELLING: 0
ABDOMINAL PAIN: 0
CONSTIPATION: 0
TROUBLE SWALLOWING: 0
SHORTNESS OF BREATH: 0
DIARRHEA: 0
CHEST TIGHTNESS: 0

## 2020-11-06 NOTE — PROGRESS NOTES
of PT for his back and is feeling a little better. Still having problems with the hips. Would like to have MRI now if possible. He said on Sunday it was bad but he took the Naprosyn and felt better. He has continued problems with the left hip but has history of herniated disc at L4-L5, also bulging discs from several years ago. He is having worsening pain in the left side of the lower back which radiates around the hip down the front of the left thigh and he has tingling down the back of the leg to the anterior knee and down the front of the leg to the toes. 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 Nothing really gives him much relief.  He did take Naprosyn 500 mg which did help with the pain. His last MRI was in 2017 . In 1991 he had duramorph injection at [facility]. Degenerative disc disease as described above.         Spinal canal narrowing, moderate at L4-5.         Narrowing of the bilateral lateral recesses at L4-5, contacting left exiting    L4 and left descending L5 nerve roots, and contacting the right descending L5    nerve root.         Foraminal narrowing, severe at left L5-S1, moderate to severe at right L5-S1,    moderate at bilateral L4-5, mild at left L3-4. Due for flu vaccine, shingles vaccine, due for microalbumin    Today's vital signs are as follows:  /80   Pulse 57   Temp 97.8 °F (36.6 °C)   Resp 18   Ht 5' 6\" (1.676 m)   Wt 202 lb (91.6 kg)   SpO2 99%   BMI 32.60 kg/m²     Lab Results   Component Value Date    LABA1C 8.4 11/06/2020     Pain scale: 3/10 today in back. Will worsen when he starts working.          Current Outpatient Medications:     zoster recombinant adjuvanted vaccine (SHINGRIX) 50 MCG/0.5ML SUSR injection, Inject 0.5 mLs into the muscle See Admin Instructions 1 dose now and repeat in 2-6 months, Disp: 0.5 mL, Rfl: 1    metFORMIN (GLUCOPHAGE-XR) 500 MG extended release tablet, Take 1 tablet by mouth 2 times daily (with meals), Disp: 180 tablet, Rfl: 1    empagliflozin (JARDIANCE) 25 MG tablet, Take 25 mg by mouth daily, Disp: 30 tablet, Rfl: 6    naproxen (NAPROSYN) 500 MG tablet, Take 1 tablet by mouth 2 times daily (with meals) (Patient taking differently: Take 500 mg by mouth as needed ), Disp: 60 tablet, Rfl: 0    lisinopril (PRINIVIL;ZESTRIL) 10 MG tablet, Take 1 tablet by mouth daily, Disp: 90 tablet, Rfl: 3    atenolol (TENORMIN) 25 MG tablet, TAKE ONE TABLET BY MOUTH ONCE A DAY., Disp: 90 tablet, Rfl: 3    atorvastatin (LIPITOR) 40 MG tablet, Take 1 tablet by mouth daily, Disp: 90 tablet, Rfl: 3    fluticasone (FLONASE) 50 MCG/ACT nasal spray, 1 spray by Nasal route daily, Disp: 3 Bottle, Rfl: 3    Blood Glucose Monitoring Suppl (ACURA BLOOD GLUCOSE METER) W/DEVICE KIT, 1 Device by Does not apply route daily, Disp: 1 kit, Rfl: 0    glucose blood VI test strips (EXACTECH TEST) strip, 1 each by In Vitro route daily As needed. , Disp: 100 each, Rfl: 3    Multiple Vitamin (MULTI-VITAMIN) TABS, Take 1 tablet by mouth Daily. , Disp: , Rfl:   Social History     Socioeconomic History    Marital status:      Spouse name: None    Number of children: None    Years of education: None    Highest education level: None   Occupational History    None   Social Needs    Financial resource strain: None    Food insecurity     Worry: None     Inability: None    Transportation needs     Medical: None     Non-medical: None   Tobacco Use    Smoking status: Former Smoker     Packs/day: 1.50     Years: 25.00     Pack years: 37.50     Last attempt to quit: 2002     Years since quittin.7    Smokeless tobacco: Never Used   Substance and Sexual Activity    Alcohol use: Yes     Comment: Social     Drug use: No    Sexual activity: None   Lifestyle    Physical activity     Days per week: None     Minutes per session: None    Stress: None   Relationships    Social connections     Talks on phone: None     Gets together: None     Attends [employer] service: None     Active member of club or organization: None     Attends meetings of clubs or organizations: None     Relationship status: None    Intimate partner violence     Fear of current or ex partner: None     Emotionally abused: None     Physically abused: None     Forced sexual activity: None   Other Topics Concern    None   Social History Narrative    None       I have reviewed Rohan's allergies, medications, problem list, medical, social and family history and have updated as needed in the electronic medical record    Review of Systems   Constitutional: Negative for activity change, appetite change, chills, diaphoresis, fatigue, fever and unexpected weight change. HENT: Negative for congestion, dental problem, drooling, ear discharge, ear pain, facial swelling, hearing loss, mouth sores, nosebleeds, postnasal drip, rhinorrhea, sinus pressure, sinus pain, sneezing, sore throat, tinnitus, trouble swallowing and voice change. Eyes: Negative for visual disturbance. Respiratory: Negative for cough, chest tightness, shortness of breath and wheezing. Cardiovascular: Negative for chest pain, palpitations and leg swelling. Gastrointestinal: Negative for abdominal pain, constipation, diarrhea, nausea and vomiting. Endocrine: Negative for cold intolerance, heat intolerance, polydipsia, polyphagia and polyuria. Genitourinary: Negative for difficulty urinating, frequency and urgency. Musculoskeletal: Positive for arthralgias, back pain and gait problem ( at times when hip is flared up). Negative for joint swelling, myalgias, neck pain and neck stiffness. Skin: Negative for color change, pallor, rash and wound. Allergic/Immunologic: Negative for environmental allergies, food allergies and immunocompromised state. Neurological: Positive for numbness ( left leg).  Negative for dizziness, tremors, seizures, syncope, facial asymmetry, speech difficulty, weakness, light-headedness and headaches. Hematological: Negative for adenopathy. Does not bruise/bleed easily. Psychiatric/Behavioral: Negative for agitation, behavioral problems, confusion, decreased concentration, dysphoric mood, hallucinations, self-injury, sleep disturbance and suicidal ideas. The patient is not nervous/anxious and is not hyperactive. OBJECTIVE:     VS:  Wt Readings from Last 3 Encounters:   11/06/20 202 lb (91.6 kg)   09/30/20 202 lb (91.6 kg)   07/09/20 202 lb (91.6 kg)                       Vitals:    11/06/20 1004   BP: 132/80   Pulse: 57   Resp: 18   Temp: 97.8 °F (36.6 °C)   SpO2: 99%   Weight: 202 lb (91.6 kg)   Height: 5' 6\" (1.676 m)       General: Alert and oriented to person, place, and time, well developed and well nourished, in no acute distress  SKIN: Warm and dry, intact without any rash, masses or lesions  HEAD: normocephalic, atraumatic  Eyes: sclera/conjunctiva clear, PERRLA, EOMI's intact  Neck: supple and non-tender without mass, trachea midline, no cervical lymphadenopathy, no bruit, no thyromegaly or nodules  Cardiovascular: regular rate and regular rhythm, normal S1 and S2,  no murmurs, rubs, clicks, or gallop. Distal pulses intact, no carotid bruits. No edema  Pulmonary/Chest: clear to auscultation bilaterally, no wheezes, rales or rhonchi, normal air movement, no respiratory distress  Abdomen: soft, non-tender, non-distended, normal bowel sounds, no masses or hepatosplenomegaly  Musculoskeletal: Normal ROM with tenderness along the left lower back over the SI joint and L4-L5M , no joint swelling, deformity  Neurologic: reflexes normal and symmetric, no cranial nerve deficit, gait, coordination and speech normal  Extremities: no clubbing, cyanosis, or edema. Psychiatric: Good eye contact, normal mood and affect, answers questions appropriately    ASSESSMENT/PLAN   Ryder Browning was seen today for diabetes, hypertension, discuss labs and health maintenance.     Diagnoses and all orders for this visit:    Type 2 diabetes mellitus without complication, without long-term current use of insulin (HCC) worsening up from 8.0% to 8.4%-     POCT glycosylated hemoglobin (Hb A1C)  -     POCT Glucose  -     POCT microalbumin  -     Comprehensive Metabolic Panel; Future  Discussed after review of labs will adjust medications due to his renal insufficiency if GFR is at or below 45 will need to stop the metformin. Discussed starting either victoza or trulicity and he is agreeable. Essential hypertension stable  -Discussed taking medication and directed every day. -Discussed exercising daily 30 minutes 5 times a week for 150 minutes weekly.  -Discussed weight reduction if needed.  -Discussed low sodium diet.  -Discussed limiting caffeine consumption and tobacco cessation and the effects they have on the heart and blood pressure. Low back pain radiating to left lower extremity  -     Ambulatory referral to Pain Clinic    Need for shingles vaccine  -     zoster recombinant adjuvanted vaccine [facility]) 50 MCG/0.5ML SUSR injection; Inject 0.5 mLs into the muscle See Admin Instructions 1 dose now and repeat in 2-6 months    Needs flu shot  -     INFLUENZA, QUADV, ADJUVANTED, 65 YRS =, IM, PF, PREFILL SYR, 0.5ML (FLUAD)    · Wash your hands regularly, and keep your hands away from your face. · Cover your mouth when you cough or sneeze. · Rest, plenty of fluids, Tylenol for body aches, fever. · Stay home from school, work, and other public places until you are feeling better and your fever has been gone for at least 24 hours. The fever needs to have gone away on its own without the help of medicine. · Ask people living with you to talk to their doctors about preventing the flu. They may get antiviral medicine to keep from getting the flu from you. · To prevent the flu in the future, get a flu vaccine every fall. Encourage people living with you to get the vaccine.     ·               Return in about 3 months (around 2/6/2021) for DM, HTN, hyperlipidemia, test results. Discussed weight loss, Discussed exercising 30 minutes daily and Discussed taking medications as directed and adverse effects        I have reviewed my findings and recommendations with Flora Coronado.     Jeff Booth, NP-C, FNP-BC

## 2020-11-06 NOTE — PATIENT INSTRUCTIONS
Patient Education        liraglutide  Pronunciation:  LIR a GLOO tide  Brand:  Gavino Fix  What is the most important information I should know about liraglutide? Do not use Saxenda and Victoza together. You should not use liraglutide if you have multiple endocrine neoplasia type 2 (tumors in your glands), a personal or family history of medullary thyroid cancer. In animal studies, liraglutide caused thyroid tumors or thyroid cancer. It is not known whether these effects would occur in people using regular doses. Call your doctor at once if you have signs of a thyroid tumor, such as swelling or a lump in your neck, trouble swallowing, a hoarse voice, or shortness of breath. What is liraglutide? The Victoza brand of liraglutide is used together with diet and exercise to improve blood sugar control in adults and children at least 8years old who have type 2 diabetes mellitus. Victoza  is also used to help reduce the risk of serious heart problems such as heart attack or stroke in adults who have type 2 diabetes and heart disease. Victoza is not for treating type 1 diabetes. The Saxenda brand of liraglutide is used together with diet and exercise to help people lose weight when they have certain health conditions. Emmer Marts is not for treating type 1 or type 2 diabetes. Emmer Marts is not a weight-loss medicine or appetite suppressant. Liraglutide may also be used for purposes not listed in this medication guide. What should I discuss with my health care provider before using liraglutide? You should not use liraglutide if you are allergic to it, or if you have:  · multiple endocrine neoplasia type 2 (tumors in your glands); or  · a personal or family history of medullary thyroid carcinoma (a type of thyroid cancer). You should not use Saxenda if you also use other medicines like liraglutide (albiglutide, dulaglutide, exenatide, Byetta, Bydureon, Tanzeum, Trulicity).   Tell your doctor if you have ever irritable, confused, anxious, or shaky. To quickly treat hypoglycemia, eat or drink a fast-acting source of sugar (fruit juice, hard candy, crackers, raisins, or non-diet soda). Your doctor may prescribe a glucagon injection kit in case you have severe hypoglycemia. Be sure your family or close friends know how to give you this injection in an emergency. Also watch for signs of high blood sugar (hyperglycemia) such as increased thirst or urination. Blood sugar levels can be affected by stress, illness, surgery, exercise, alcohol use, or skipping meals. Ask your doctor before changing your dose or medication schedule. Storing unopened injection pens: Refrigerate and use until the expiration date. Storing after your first use: Store the pen in a refrigerator or at room temperature and use within 30 days. Do not freeze liraglutide, and throw away the medicine if it has become frozen. Use a needle only once and then place it in a puncture-proof \"sharps\" container. Follow state or local laws about how to dispose of this container. Keep it out of the reach of children and pets. What happens if I miss a dose? Skip the missed dose and use the next regularly scheduled dose. Do not use two doses at one time. Call your doctor for instructions if you miss 3 or more doses of Saxenda. What happens if I overdose? Seek emergency medical attention or call the Poison Help line at 1-514.234.4165. What should I avoid while using liraglutide? Never share an injection pen, cartridge, or syringe with another person, even if the needle has been changed. Sharing these devices can allow infections or disease to pass from one person to another. What are the possible side effects of liraglutide? Get emergency medical help if you have signs of an allergic reaction:  hives; fast heartbeats; dizziness; trouble breathing or swallowing; swelling of your face, lips, tongue, or throat.   Call your doctor at once if you have:  · racing or pounding heartbeats;  · sudden changes in mood or behavior, suicidal thoughts;  · dehydration symptoms --feeling very thirsty or hot, being unable to urinate, heavy sweating, or hot and dry skin;  · low blood sugar --headache, hunger, sweating, irritability, dizziness, fast heart rate, and feeling anxious or shaky;  · gallbladder or pancreas problems --sudden and severe pain in your upper stomach that may spread to your back, nausea, vomiting, fever, jaundice (yellowing of your skin or eyes); or  · signs of a thyroid tumor --swelling or a lump in your neck, trouble swallowing, a hoarse voice, feeling short of breath. Common side effects may include:  · low blood sugar;  · nausea, vomiting, stomach discomfort, loss of appetite;  · diarrhea, constipation;  · rash;  · headache, dizziness; or  · feeling tired. This is not a complete list of side effects and others may occur. Call your doctor for medical advice about side effects. You may report side effects to FDA at 2-570-FDA-6457. What other drugs will affect liraglutide? Liraglutide can slow your digestion, and it may take longer for your body to absorb any medicines you take by mouth. Tell your doctor if you also use insulin or oral diabetes medicine. Other drugs may affect liraglutide, including prescription and over-the-counter medicines, vitamins, and herbal products. Tell your doctor about all your current medicines and any medicine you start or stop using. Where can I get more information? Your pharmacist can provide more information about liraglutide. Remember, keep this and all other medicines out of the reach of children, never share your medicines with others, and use this medication only for the indication prescribed. Every effort has been made to ensure that the information provided by [facility] is accurate, up-to-date, and complete, but no guarantee is made to that effect.  Drug information contained herein may be time sensitive. [employer] information has been compiled for use by healthcare practitioners and consumers in the [place] and therefore [employer] does not warrant that uses outside of the [place] are appropriate, unless specifically indicated otherwise. [facility]'s drug information does not endorse drugs, diagnose patients or recommend therapy. [facility] drug information is an informational resource designed to assist licensed healthcare practitioners in caring for their patients and/or to serve consumers viewing this service as a supplement to, and not a substitute for, the expertise, skill, knowledge and judgment of healthcare practitioners. The absence of a warning for a given drug or drug combination in no way should be construed to indicate that the drug or drug combination is safe, effective or appropriate for any given patient. [facility] does not assume any responsibility for any aspect of healthcare administered with the aid of information [facility] provides. The information contained herein is not intended to cover all possible uses, directions, precautions, warnings, drug interactions, allergic reactions, or adverse effects. If you have questions about the drugs you are taking, check with your doctor, nurse or pharmacist.  Copyright 1760-0094 [address]. Version: 10.01. Revision date: 4/9/2020. Care instructions adapted under license by [facility] ([facility]). If you have questions about a medical condition or this instruction, always ask your healthcare professional. [place] [address] any warranty or liability for your use of this information. Patient Education        dulaglutide  Pronunciation:  DOO la GLOO tide  Brand:  Trulicity Pen  What is the most important information I should know about dulaglutide? You should not use dulaglutide if you have Multiple Endocrine Neoplasia syndrome type 2 (MEN 2), or a personal or family history of medullary thyroid carcinoma (a type of thyroid cancer). Do not use dulaglutide if you are in a state of diabetic ketoacidosis (call your doctor for treatment). In animal studies, dulaglutide caused thyroid tumors or thyroid cancer. It is not known whether these effects would occur in people using regular doses. Ask your doctor about your risk. Call your doctor at once if you have signs of a thyroid tumor, such as swelling or a lump in your neck, trouble swallowing, a hoarse voice, or shortness of breath. What is dulaglutide? Dulaglutide is used together with diet and exercise to improve blood sugar control in adults with type 2 diabetes mellitus. Dulaglutide is usually given after other diabetes medicines have been tried without success. This medicine is not for treating type 1 diabetes. Dulaglutide may also be used for purposes not listed in this medication guide. What should I discuss with my healthcare provider before using dulaglutide? You should not use dulaglutide if you are allergic to it, or if you have:  · multiple endocrine neoplasia type 2 (tumors in your glands);  · a personal or family history of medullary thyroid carcinoma (a type of thyroid cancer); or  · diabetic ketoacidosis (call your doctor for treatment). Tell your doctor if you have ever had:  · pancreatitis;  · a stomach or intestinal disorder;  · gastroesophageal reflux disease (GERD) or slow digestion;  · liver or kidney disease;  · if you also use insulin or oral diabetes medicine; or  · if you have been sick with vomiting or diarrhea. In animal studies, dulaglutide caused thyroid tumors or thyroid cancer. It is not known whether these effects would occur in people using regular doses. Ask your doctor about your risk. It is not known whether this medicine will harm an unborn baby. Tell your doctor if you are pregnant or plan to become pregnant. It may not be safe to breast-feed while using this medicine. Ask your doctor about any risk.   Dulaglutide is not approved for use by anyone younger than 25years old. How should I use dulaglutide? Follow all directions on your prescription label and read all medication guides or instruction sheets. Use the medicine exactly as directed. Dulaglutide is injected under the skin once per week. Use dulaglutide on the same day each week at the same time of day. If you change your dosing day, allow at least 3 days to pass between doses. You may use dulaglutide with or without food. Read and carefully follow any Instructions for Use provided with your medicine. Ask your doctor or pharmacist if you do not understand these instructions. Your healthcare provider will show you where on your body to inject dulaglutide. Use a different place each time you give an injection. Do not inject into the same place two times in a row. You may have low blood sugar (hypoglycemia) and feel very hungry, dizzy, irritable, confused, anxious, or shaky. To quickly treat hypoglycemia, eat or drink a fast-acting source of sugar (fruit juice, hard candy, crackers, raisins, or non-diet soda). Your doctor may prescribe a glucagon injection kit in case you have severe hypoglycemia. Be sure your family or close friends know how to give you this injection in an emergency. Also watch for signs of high blood sugar (hyperglycemia) such as increased thirst or urination. Blood sugar levels can be affected by stress, illness, surgery, exercise, alcohol use, or skipping meals. Ask your doctor before changing your dose or medication schedule. Each injection pen or prefilled syringe is for one use only. Throw away after one use, even if there is still medicine left inside. Use a puncture-proof \"sharps\" container. Follow state or local laws about how to dispose of this container. Keep it out of the reach of children and pets. Store dulaglutide in the refrigerator, protected from light. Do not use past the expiration date on the medicine label.  Do not freeze dulaglutide, and throw away the medicine if it has become frozen. You may also store dulaglutide at room temperature for up to 14 days before use. What happens if I miss a dose? Use the medicine as soon as you can, but skip the missed dose if your next dose is due in less than 3 days. Do not use two doses at one time. Do not use this medicine twice within a 72-hour period. What happens if I overdose? Seek emergency medical attention or call the Poison Help line at 1-731.198.8598. What should I avoid while using dulaglutide? Never share an injection pen or prefilled syringe with another person, even if the needle has been changed. Sharing these devices can allow infections or disease to pass from one person to another. What are the possible side effects of dulaglutide? Stop using dulaglutide and get emergency medical help if you have signs of an allergic reaction: hives; difficult breathing; feeling light-headed; swelling of your face, lips, tongue, or throat. Call your doctor at once if you have:  · pancreatitis --severe pain in your upper stomach spreading to your back, nausea and vomiting;  · signs of a thyroid tumor --swelling or a lump in your neck, trouble swallowing, a hoarse voice, or if you feel short of breath;  · low blood sugar --headache, hunger, weakness, sweating, confusion, irritability, dizziness, fast heart rate, or feeling jittery; or  · kidney problems --little or no urination, swelling in your feet or ankles, feeling tired or short of breath. Tell your doctor if you are sick with vomiting or diarrhea, or if you are sweating more than usual. You can easily become dehydrated while using dulaglutide. Common side effects may include:  · nausea, vomiting, stomach pain;  · diarrhea; or  · loss of appetite. This is not a complete list of side effects and others may occur. Call your doctor for medical advice about side effects. You may report side effects to FDA at 0-446-FDA-6494.   What other drugs will affect dulaglutide? Dulaglutide can slow your digestion, and it may take longer for your body to absorb any medicines you take by mouth. Other drugs may affect dulaglutide, including prescription and over-the-counter medicines, vitamins, and herbal products. Tell your doctor about all your current medicines and any medicine you start or stop using. Where can I get more information? Your pharmacist can provide more information about dulaglutide. Remember, keep this and all other medicines out of the reach of children, never share your medicines with others, and use this medication only for the indication prescribed. Every effort has been made to ensure that the information provided by [facility]  is accurate, up-to-date, and complete, but no guarantee is made to that effect. Drug information contained herein may be time sensitive. [facility] information has been compiled for use by healthcare practitioners and consumers in the [place] and therefore [facility] does not warrant that uses outside of the [place] are appropriate, unless specifically indicated otherwise. [facility]'s drug information does not endorse drugs, diagnose patients or recommend therapy. [facility] drug information is an informational resource designed to assist licensed healthcare practitioners in caring for their patients and/or to serve consumers viewing this service as a supplement to, and not a substitute for, the expertise, skill, knowledge and judgment of healthcare practitioners. The absence of a warning for a given drug or drug combination in no way should be construed to indicate that the drug or drug combination is safe, effective or appropriate for any given patient. [facility] does not assume any responsibility for any aspect of healthcare administered with the aid of information [facility] provides.  The information contained herein is not intended to cover all possible uses, directions, precautions, warnings, drug

## 2020-11-09 RX ORDER — EMPAGLIFLOZIN 25 MG/1
25 TABLET, FILM COATED ORAL DAILY
Qty: 30 TABLET | Refills: 6 | Status: SHIPPED
Start: 2020-11-09 | End: 2021-02-19 | Stop reason: SDUPTHER

## 2020-11-09 RX ORDER — METFORMIN HYDROCHLORIDE 500 MG/1
500 TABLET, EXTENDED RELEASE ORAL 2 TIMES DAILY WITH MEALS
Qty: 180 TABLET | Refills: 1 | Status: SHIPPED
Start: 2020-11-09 | End: 2021-02-19 | Stop reason: SDUPTHER

## 2020-11-13 ENCOUNTER — PREP FOR PROCEDURE (OUTPATIENT)
Dept: PAIN MANAGEMENT | Age: 60
End: 2020-11-13

## 2020-11-13 ENCOUNTER — OFFICE VISIT (OUTPATIENT)
Dept: PAIN MANAGEMENT | Age: 60
End: 2020-11-13
Payer: MEDICAID

## 2020-11-13 VITALS
SYSTOLIC BLOOD PRESSURE: 130 MMHG | OXYGEN SATURATION: 97 % | HEIGHT: 66 IN | HEART RATE: 60 BPM | DIASTOLIC BLOOD PRESSURE: 72 MMHG | BODY MASS INDEX: 32.47 KG/M2 | RESPIRATION RATE: 16 BRPM | WEIGHT: 202 LBS | TEMPERATURE: 97.7 F

## 2020-11-13 PROBLEM — M47.816 LUMBAR FACET ARTHROPATHY: Status: ACTIVE | Noted: 2020-11-13

## 2020-11-13 PROBLEM — M48.061 FORAMINAL STENOSIS OF LUMBAR REGION: Status: ACTIVE | Noted: 2020-11-13

## 2020-11-13 PROBLEM — M51.9 LUMBAR DISC DISORDER: Status: ACTIVE | Noted: 2020-11-13

## 2020-11-13 PROBLEM — G89.4 CHRONIC PAIN SYNDROME: Status: ACTIVE | Noted: 2020-11-13

## 2020-11-13 PROBLEM — M54.16 LUMBAR RADICULOPATHY: Status: ACTIVE | Noted: 2020-11-13

## 2020-11-13 PROBLEM — M47.816 LUMBAR SPONDYLOSIS: Status: ACTIVE | Noted: 2020-11-13

## 2020-11-13 PROBLEM — M48.061 SPINAL STENOSIS OF LUMBAR REGION WITHOUT NEUROGENIC CLAUDICATION: Status: ACTIVE | Noted: 2020-11-13

## 2020-11-13 PROCEDURE — G8484 FLU IMMUNIZE NO ADMIN: HCPCS | Performed by: PAIN MEDICINE

## 2020-11-13 PROCEDURE — 1036F TOBACCO NON-USER: CPT | Performed by: PAIN MEDICINE

## 2020-11-13 PROCEDURE — 99204 OFFICE O/P NEW MOD 45 MIN: CPT | Performed by: PAIN MEDICINE

## 2020-11-13 PROCEDURE — 3017F COLORECTAL CA SCREEN DOC REV: CPT | Performed by: PAIN MEDICINE

## 2020-11-13 PROCEDURE — G8417 CALC BMI ABV UP PARAM F/U: HCPCS | Performed by: PAIN MEDICINE

## 2020-11-13 PROCEDURE — G8427 DOCREV CUR MEDS BY ELIG CLIN: HCPCS | Performed by: PAIN MEDICINE

## 2020-11-13 NOTE — PROGRESS NOTES
[address]        [address] [facility], [address] [facility]      [phone]          Consult Note      Patient:  SERENITY Mccarty 1960    Date of Service:  20    Requesting Physician:  SUZIE Hernandez - *    Reason for Consult:      Patient presents with complaints of low back pain that started a long time ago and has been progressively getting worse lately. HISTORY OF PRESENT ILLNESS:      Pain does radiate to Left lower extremity down to the Left foot. He  does not have numbness, tingling, weakness and does not have bladder or bowel dysfunction. He has not been on anticoagulation medications to include none. The patient  has not been on herbal supplements. The patient is diabetic. Imaging:   Lumbar spine MRI :  Degenerative disc disease as described above.         Spinal canal narrowing, moderate at L4-5.         Narrowing of the bilateral lateral recesses at L4-5, contacting left exiting    L4 and left descending L5 nerve roots, and contacting the right descending L5    nerve root.         Foraminal narrowing, severe at left L5-S1, moderate to severe at right L5-S1,    moderate at bilateral L4-5, mild at left L3-4. Previous treatments: Physical Therapy and medications. .      Past Medical History:   Diagnosis Date    Cervical radiculopathy     Bilateral upper extemities    Degeneration, intervertebral disc, cervical     C4-C5 through C6-C7 levels    Diabetes mellitus ([address]     Headache(784.0)     Hyperlipidemia     Hypertension     Low back pain radiating to left lower extremity 2020    Peptic ulcer 2020    Spinal stenosis     Congenital     Type 2 diabetes mellitus without complication, without long-term current use of insulin ([address] 8/15/2016     Past Surgical History:   Procedure Laterality Date    ANUS SURGERY  2013    Excision Rianna-anal condyloma, anoscopy    BACK SURGERY      epidural    CHOLECYSTECTOMY, LAPAROSCOPIC  2010    LYMPH NODE BIOPSY      benign     Prior to Admission medications    Medication Sig Start Date End Date Taking? Authorizing Provider   metFORMIN (GLUCOPHAGE-XR) 500 MG extended release tablet Take 1 tablet by mouth 2 times daily (with meals) 11/9/20  Yes SUZIE Dawson CNP   empagliflozin (JARDIANCE) 25 MG tablet Take 25 mg by mouth daily 11/9/20  Yes SUZIE Dawson CNP   Dulaglutide 0.75 MG/0.5ML SOPN Inject 0.75 mg into the skin once a week 11/9/20  Yes SUZIE Dawson CNP   naproxen (NAPROSYN) 500 MG tablet Take 1 tablet by mouth 2 times daily (with meals)  Patient taking differently: Take 500 mg by mouth as needed  7/1/20  Yes SUZIE Dawson CNP   lisinopril (PRINIVIL;ZESTRIL) 10 MG tablet Take 1 tablet by mouth daily 2/17/20  Yes SUZIE Dawson CNP   atenolol (TENORMIN) 25 MG tablet TAKE ONE TABLET BY MOUTH ONCE A DAY. 2/17/20  Yes SUZIE Dawson CNP   atorvastatin (LIPITOR) 40 MG tablet Take 1 tablet by mouth daily 2/17/20  Yes SUZIE Dawson CNP   fluticasone (FLONASE) 50 MCG/ACT nasal spray 1 spray by Nasal route daily 6/16/17  Yes SUZIE Dawson CNP   Blood Glucose Monitoring Suppl (ACURA BLOOD GLUCOSE METER) W/DEVICE KIT 1 Device by Does not apply route daily 8/15/16  Yes SUZIE Morris CNP   glucose blood VI test strips (EXACTECH TEST) strip 1 each by In Vitro route daily As needed. 8/15/16  Yes SUZIE Dawson CNP   Multiple Vitamin (MULTI-VITAMIN) TABS Take 1 tablet by mouth Daily.    Yes Historical Provider, MD   zoster recombinant adjuvanted vaccine [facility]) 50 MCG/0.5ML SUSR injection Inject 0.5 mLs into the muscle See Admin Instructions 1 dose now and repeat in 2-6 months  Patient not taking: Reported on 11/13/2020 11/6/20 5/5/21  Ermelinda Eli, APRN - CNP     Allergies   Allergen Reactions    Lopid [Gemfibrozil] Other (See Comments)     Enlarged prostate     Social History     Socioeconomic History    Marital status:      Spouse name: Not on file    Number of children: Not on file    Years of education: Not on file    Highest education level: Not on file   Occupational History    Not on file   Social Needs    Financial resource strain: Not on file    Food insecurity     Worry: Not on file     Inability: Not on file    Transportation needs     Medical: Not on file     Non-medical: Not on file   Tobacco Use    Smoking status: Former Smoker     Packs/day: 1.50     Years: 25.00     Pack years: 37.50     Last attempt to quit: 2002     Years since quittin.7    Smokeless tobacco: Never Used   Substance and Sexual Activity    Alcohol use: Yes     Comment: Social     Drug use: No    Sexual activity: Not on file   Lifestyle    Physical activity     Days per week: Not on file     Minutes per session: Not on file    Stress: Not on file   Relationships    Social connections     Talks on phone: Not on file     Gets together: Not on file     Attends Worship service: Not on file     Active member of club or organization: Not on file     Attends meetings of clubs or organizations: Not on file     Relationship status: Not on file    Intimate partner violence     Fear of current or ex partner: Not on file     Emotionally abused: Not on file     Physically abused: Not on file     Forced sexual activity: Not on file   Other Topics Concern    Not on file   Social History Narrative    Not on file     Family History   Problem Relation Age of Onset    Hypertension Mother     Hypertension Father     Cancer Father         Prostate    Heart Surgery Father     Heart Disease Father     High Cholesterol Sister     High Cholesterol Brother     Heart Disease Brother      REVIEW OF SYSTEMS:     Patient specifically denies fever/chills, chest pain, shortness of breath, new bowel or bladder complaints. All other review of systems was negative.     PHYSICAL EXAMINATION:      /72   Pulse 60   Temp 97.7 °F (36.5 °C) (Infrared)   Resp 16   Ht 5' 6\" (1.676 m)   Wt 202 lb (91.6 kg)   SpO2 97%   BMI 32.60 kg/m²     General:      General appearance:   pleasant and well-hydrated. , in moderate discomfort and A & O x3  Build:Normal Weight    HEENT:    Head:normocephalic and atraumatic  Sclera: icterus absent,     Lungs:    Breathing:Breathing Pattern: regular, no distress    Abdomen:    Shape:non-distended and normal  Tenderness:none    Lumbar spine:    Spine inspection:normal   CVA tenderness:No   Palpation:tenderness paravertebral muscles, facet loading, left, right, positive and tenderness. Range of motion:abnormal moderately Lateral bending, flexion, extension rotation bilateral and is  painful. Musculoskeletal:    Trigger points in Paraveteral:absent bilaterally  SI joint tenderness:negative right, negative left              VALENTINE test:negative right, negative             left  Piriformis tenderness:negative right, negative left  Trochanteric bursa tenderness:negative right, negative left  SLR:negative right, positive left, sitting     Extremities:    Tremors:None bilaterally upper and lower  Range of motion:pain with internal rotation of hips negative. Intact:Yes  Edema:Normal    Neurological:    Sensory:normal to light touch bilateral lower extremities. Motor:                             Right Quadriceps5/5          Left Quadriceps5/5           Right Gastrocnemius5/5    Left Gastrocnemius5/5  Right Ant Tibialis5/5  Left Ant Tibialis5/5  Reflexes:    Right Quadriceps reflex2+  Left Quadriceps reflex1+  Right Achilles reflex2+  Left Achilles reflex2+  Gait:normal    Dermatology:    Skin:no unusual rashes and no skin lesions    Impression:  Low back pain with radiation to the Left lower extremity along the Left L4 dermatome. Lumbar spine MRI 2020:L4-5 Left paracentral disc extrusion   Plan:  Lumbar disc disorder with Left L4 radiculopathy.   Reviewed lumbar spine MRI and discussed with the

## 2020-12-04 ENCOUNTER — TELEPHONE (OUTPATIENT)
Dept: PAIN MANAGEMENT | Age: 60
End: 2020-12-04

## 2020-12-04 NOTE — TELEPHONE ENCOUNTER
12-4-20-received a denial for Rohan's 12-10-20 procedure. Called him to notify him of this. He wants to appeal because he had 6 weeks of physical therapy. Appeal paperwork sent to [facility]. Karen Garrick knows his 12-10-20 procedure is cancelled, will call when the appeal comes back to reschedule.     Lacie Diaz RN  Pain Management

## 2020-12-07 ENCOUNTER — TELEPHONE (OUTPATIENT)
Dept: FAMILY MEDICINE CLINIC | Age: 60
End: 2020-12-07

## 2020-12-07 RX ORDER — GLUCOSAMINE HCL/CHONDROITIN SU 500-400 MG
CAPSULE ORAL
Qty: 100 STRIP | Refills: 3 | Status: SHIPPED
Start: 2020-12-07 | End: 2021-02-19 | Stop reason: SDUPTHER

## 2020-12-07 NOTE — TELEPHONE ENCOUNTER
Received authorization. Call to Imtiaz Booth that procedure was approved for 12/14/2020 and that the surgery center should call him a few days before for the pre op call and after 3:00 PM the business day before with the arrival time. Instructed Rohan to hold ibuprofen for 24 hours, naprosyn for 4 days and any aspirin containing products for 7 days. Instructed to call office back if any questions. Instructed of need for COVID-19 testing and self quarantining. Rohan verbalized understanding.     Devaughn Nascimento 's response to the following screening questions:    Fever: No  Headache:  No  Cough: No  Shortness of breath: No  Exposed to anyone with these symptoms: No

## 2020-12-08 ENCOUNTER — HOSPITAL ENCOUNTER (OUTPATIENT)
Age: 60
Discharge: HOME | End: 2020-12-10
Payer: MEDICAID

## 2020-12-08 DIAGNOSIS — M54.16 LUMBAR RADICULOPATHY: ICD-10-CM

## 2020-12-08 PROCEDURE — U0003 INFECTIOUS AGENT DETECTION BY NUCLEIC ACID (DNA OR RNA); SEVERE ACUTE RESPIRATORY SYNDROME CORONAVIRUS 2 (SARS-COV-2) (CORONAVIRUS DISEASE [COVID-19]), AMPLIFIED PROBE TECHNIQUE, MAKING USE OF HIGH THROUGHPUT TECHNOLOGIES AS DESCRIBED BY CMS-2020-01-R: HCPCS

## 2020-12-10 LAB
SARS-COV-2: NOT DETECTED
SOURCE: NORMAL

## 2020-12-11 ENCOUNTER — TELEPHONE (OUTPATIENT)
Dept: PAIN MANAGEMENT | Age: 60
End: 2020-12-11

## 2020-12-11 NOTE — TELEPHONE ENCOUNTER
Milagro Paige called in and wanted to know results of COVID and time for procedure. Instructed that they would call him after 3 pm today with a time and COVID was negative.

## 2020-12-14 ENCOUNTER — HOSPITAL ENCOUNTER (OUTPATIENT)
Dept: OPERATING ROOM | Age: 60
Setting detail: OUTPATIENT SURGERY
Discharge: HOME | End: 2020-12-14
Attending: PAIN MEDICINE
Payer: MEDICAID

## 2020-12-14 ENCOUNTER — HOSPITAL ENCOUNTER (OUTPATIENT)
Age: 60
Setting detail: OUTPATIENT SURGERY
Discharge: HOME | End: 2020-12-14
Attending: PAIN MEDICINE | Admitting: PAIN MEDICINE
Payer: MEDICAID

## 2020-12-14 VITALS
DIASTOLIC BLOOD PRESSURE: 72 MMHG | WEIGHT: 202 LBS | HEIGHT: 66 IN | BODY MASS INDEX: 32.47 KG/M2 | OXYGEN SATURATION: 94 % | SYSTOLIC BLOOD PRESSURE: 133 MMHG | HEART RATE: 70 BPM | RESPIRATION RATE: 14 BRPM | TEMPERATURE: 96 F

## 2020-12-14 PROCEDURE — 6360000002 HC RX W HCPCS: Performed by: PAIN MEDICINE

## 2020-12-14 PROCEDURE — 2709999900 HC NON-CHARGEABLE SUPPLY: Performed by: PAIN MEDICINE

## 2020-12-14 PROCEDURE — 64483 NJX AA&/STRD TFRM EPI L/S 1: CPT | Performed by: PAIN MEDICINE

## 2020-12-14 PROCEDURE — 6360000004 HC RX CONTRAST MEDICATION: Performed by: PAIN MEDICINE

## 2020-12-14 PROCEDURE — 64484 NJX AA&/STRD TFRM EPI L/S EA: CPT | Performed by: PAIN MEDICINE

## 2020-12-14 PROCEDURE — 7100000011 HC PHASE II RECOVERY - ADDTL 15 MIN: Performed by: PAIN MEDICINE

## 2020-12-14 PROCEDURE — 3600000005 HC SURGERY LEVEL 5 BASE: Performed by: PAIN MEDICINE

## 2020-12-14 PROCEDURE — 3209999900 FLUORO FOR SURGICAL PROCEDURES

## 2020-12-14 PROCEDURE — 2500000003 HC RX 250 WO HCPCS: Performed by: PAIN MEDICINE

## 2020-12-14 PROCEDURE — 7100000010 HC PHASE II RECOVERY - FIRST 15 MIN: Performed by: PAIN MEDICINE

## 2020-12-14 RX ORDER — LIDOCAINE HYDROCHLORIDE 5 MG/ML
INJECTION, SOLUTION INFILTRATION; INTRAVENOUS PRN
Status: DISCONTINUED | OUTPATIENT
Start: 2020-12-14 | End: 2020-12-14 | Stop reason: ALTCHOICE

## 2020-12-14 ASSESSMENT — PAIN DESCRIPTION - DESCRIPTORS: DESCRIPTORS: DISCOMFORT

## 2020-12-14 ASSESSMENT — PAIN SCALES - GENERAL
PAINLEVEL_OUTOF10: 0
PAINLEVEL_OUTOF10: 2

## 2020-12-14 ASSESSMENT — PAIN - FUNCTIONAL ASSESSMENT: PAIN_FUNCTIONAL_ASSESSMENT: 0-10

## 2020-12-14 NOTE — H&P
[facility]  [address] [facility], [address] [place] [address]  [phone]    Procedure History & Physical      Charity Montes     HPI:    Patient  is here for low back and Left leg pain for Left L4 and L5 TFESI  Labs/imaging studies reviewed   All question and concerns addressed including R/B/A associated with the procedure    Past Medical History:   Diagnosis Date    Cervical radiculopathy     Bilateral upper extemities    Degeneration, intervertebral disc, cervical     C4-C5 through C6-C7 levels    Diabetes mellitus ([address]     Headache(784.0)     Hyperlipidemia     Hypertension     Low back pain radiating to left lower extremity 2/17/2020    Peptic ulcer 1/21/2020    Spinal stenosis     Congenital     Type 2 diabetes mellitus without complication, without long-term current use of insulin ([facility] [address] 8/15/2016       Past Surgical History:   Procedure Laterality Date    ANUS SURGERY  Feb 2013    Excision Rianna-anal condyloma, anoscopy    BACK SURGERY      epidural    CHOLECYSTECTOMY, LAPAROSCOPIC  2010    LYMPH NODE BIOPSY      benign       Prior to Admission medications    Medication Sig Start Date End Date Taking? Authorizing Provider   lisinopril (PRINIVIL;ZESTRIL) 10 MG tablet Take 1 tablet by mouth daily 2/17/20  Yes SUZIE Dorado CNP   atenolol (TENORMIN) 25 MG tablet TAKE ONE TABLET BY MOUTH ONCE A DAY. 2/17/20  Yes SUZIE Dorado CNP   atorvastatin (LIPITOR) 40 MG tablet Take 1 tablet by mouth daily 2/17/20  Yes SUZIE Dorado CNP   Blood Glucose Monitoring Suppl [facility] BLOOD GLUCOSE METER) w/Device KIT 1 Device by Does not apply route daily 12/8/20   SUZIE Dorado CNP   blood glucose monitor strips Test 2 times a day & as needed for symptoms of irregular blood glucose.  12/7/20   SUZIE Dorado CNP metFORMIN (GLUCOPHAGE-XR) 500 MG extended release tablet Take 1 tablet by mouth 2 times daily (with meals) 20   SUZIE Egan CNP   empagliflozin (JARDIANCE) 25 MG tablet Take 25 mg by mouth daily 20   SUZIE Egan CNP   Dulaglutide 0.75 MG/0.5ML SOPN Inject 0.75 mg into the skin once a week 20   SUZIE Egan CNP   zoster recombinant adjuvanted vaccine [facility]) 50 MCG/0.5ML SUSR injection Inject 0.5 mLs into the muscle See Admin Instructions 1 dose now and repeat in 2-6 months 20  SUZIE Egan CNP   naproxen (NAPROSYN) 500 MG tablet Take 1 tablet by mouth 2 times daily (with meals)  Patient taking differently: Take 500 mg by mouth as needed  20   SUZIE Egan CNP   fluticasone (FLONASE) 50 MCG/ACT nasal spray 1 spray by Nasal route daily 17   SUZIE Egan CNP   Multiple Vitamin (MULTI-VITAMIN) TABS Take 1 tablet by mouth Daily.     Historical Provider, MD       Allergies   Allergen Reactions    Lopid [Gemfibrozil] Other (See Comments)     Enlarged prostate       Social History     Socioeconomic History    Marital status:      Spouse name: Not on file    Number of children: Not on file    Years of education: Not on file    Highest education level: Not on file   Occupational History    Not on file   Social Needs    Financial resource strain: Not on file    Food insecurity     Worry: Not on file     Inability: Not on file    Transportation needs     Medical: Not on file     Non-medical: Not on file   Tobacco Use    Smoking status: Former Smoker     Packs/day: 1.50     Years: 25.00     Pack years: 45.53     Quit date: 2002     Years since quittin.8    Smokeless tobacco: Never Used   Substance and Sexual Activity    Alcohol use: Yes     Comment: Social     Drug use: No    Sexual activity: Not on file   Lifestyle    Physical activity     Days per week: Not on file     Minutes per session: Not on file  Stress: Not on file   Relationships    Social connections     Talks on phone: Not on file     Gets together: Not on file     Attends Buddhist service: Not on file     Active member of club or organization: Not on file     Attends meetings of clubs or organizations: Not on file     Relationship status: Not on file    Intimate partner violence     Fear of current or ex partner: Not on file     Emotionally abused: Not on file     Physically abused: Not on file     Forced sexual activity: Not on file   Other Topics Concern    Not on file   Social History Narrative    Not on file       Family History   Problem Relation Age of Onset    Hypertension Mother     Hypertension Father     Cancer Father         Prostate    Heart Surgery Father     Heart Disease Father     High Cholesterol Sister     High Cholesterol Brother     Heart Disease Brother          REVIEW OF SYSTEMS:    CONSTITUTIONAL:  negative for  fevers, chills, sweats and fatigue    RESPIRATORY:  negative for  dry cough, cough with sputum, dyspnea, wheezing and chest pain    CARDIOVASCULAR:  negative for chest pain, dyspnea, palpitations, syncope    GASTROINTESTINAL:  negative for nausea, vomiting, change in bowel habits, diarrhea, constipation and abdominal pain    MUSCULOSKELETAL: negative for muscle weakness    SKIN: negative for itching or rashes.     BEHAVIOR/PSYCH:  negative for poor appetite, increased appetite, decreased sleep and poor concentration    All other systems negative      PHYSICAL EXAM:    VITALS:  /66   Pulse 80   Temp 96 °F (35.6 °C) (Skin)   Resp 16   Ht 5' 6\" (1.676 m)   Wt 202 lb (91.6 kg)   SpO2 97%   BMI 32.60 kg/m²     CONSTITUTIONAL:  awake, alert, cooperative, no apparent distress, and appears stated age    EYES: PERRLA, EOMI    LUNGS:  No increased work of breathing, no audible wheezing    CARDIOVASCULAR:  regular rate and rhythm    ABDOMEN:  Soft non tender non distended EXTREMITIES: no signs of clubbing or cyanosis. MUSCULOSKELETAL: negative for flaccid muscle tone or spastic movements. SKIN: gross examination reveals no signs of rashes, or diaphoresis. NEURO: Cranial nerves II-XII grossly intact. No signs of agitated mood. Assessment/Plan:    Low back and Left leg pain for Left L4 and L5 TFESI.

## 2020-12-14 NOTE — OP NOTE
2020    Patient: Laly Atkins  :  1960  Age:  61 y.o. Sex:  male     PRE-OPERATIVE DIAGNOSIS: Lumbar disc displacement, lumbar neural foraminal stenosis, lumbar radiculopathy. POST-OPERATIVE DIAGNOSIS: Same. PROCEDURE: Left Transforaminal epidural steroid injection under fluoroscopic guidance at foraminal level L4 and L5 (#1). SURGEON: JOHN Juan M.D. ANESTHESIA: Local    ESTIMATED BLOOD LOSS: None.  ______________________________________________________________________  BRIEF HISTORY: Laly Atkins comes in today for the first Left transforaminal epidural steroid injection under fluoroscopic guidance at foraminal level L4 and L5 . The potential complications of this procedure were discussed with him again today. He has elected to undergo the aforementioned procedure. Johnny complete History & Physical examination were reviewed in depth, a copy of which is in the chart. DESCRIPTION OF PROCEDURE:    After confirming written and informed consent, a time-out was performed and Johnny name and date of birth, the procedure to be performed as well as the plan for the location of the needle insertion were confirmed. The patient was brought into the procedure room and placed in the prone position on the fluoroscopy table. Standard monitors were placed and vital signs were observed throughout the procedure. The area of the lumbar spine was prepped with chloraprep and draped in a sterile manner. The vertebral body was identified with AP fluoroscopy. An oblique view was obtained to better visualize the inferior junction of the pedicle and transverse process . The 6 o'clock position of the pedicle was marked and identified. The skin and subcutaneous tissue were anesthetized with 0.5% lidocaine. A # 22 gauge pencil point needle was directed toward the targeted point under fluoroscopy until bone was contacted. The needle was then walked inferiorly until the neural foramen was entered . A lateral fluoroscopic view was then used to place the needle tip in the middle of the foramen. Negative aspiration was confirmed for blood and CSF and 0.5 cc of Omnipaque 240 contrast was injected at each level under live fluoroscopy. Appropriate neurograms were observed under AP fluoroscopy. Then after negative aspiration, a solution of the 2 cc of 0.5% lidocaine and 40 mg DepoMedrol was easily injected at each level. The needles were removed with constant aspiration technique. The patient back was cleaned and a bandage was placed over the needle insertion points    Disposition the patient tolerated the procedure well and there were no complications . Vital signs remained stable throughout the procedure. The patient was escorted to the recovery area where they remained until discharge and written discharge instructions for the procedure were given. Plan: Yudy Jordan will return to our pain management center as scheduled.      Candi Neves MD

## 2020-12-18 ENCOUNTER — VIRTUAL VISIT (OUTPATIENT)
Dept: PAIN MANAGEMENT | Age: 60
End: 2020-12-18
Payer: MEDICAID

## 2020-12-18 PROCEDURE — 3017F COLORECTAL CA SCREEN DOC REV: CPT | Performed by: PAIN MEDICINE

## 2020-12-18 PROCEDURE — G8427 DOCREV CUR MEDS BY ELIG CLIN: HCPCS | Performed by: PAIN MEDICINE

## 2020-12-18 PROCEDURE — 99213 OFFICE O/P EST LOW 20 MIN: CPT | Performed by: PAIN MEDICINE

## 2020-12-18 NOTE — PROGRESS NOTES
[address]  [address] [facility], [address] [place] [address] Chris  [phone]    Tele health follow up Note      Alfa Keller     Date of Visit:  12/18/2020    CC:  Tele health follow up   Chief Complaint   Patient presents with    Other     pain level 0     Consent:  Telehealth Visit due to [place] 19 pandemic  The patient and/or health care decision maker is aware that he/she may receive a bill for this tele-health service Doxy Me, depending on his insurance coverage, and has provided verbal consent to proceed: Yes  I have considered the risks of abuse, dependence, addiction and diversion. My patient is aware that they will need a follow-up visit (in-person or virtually) at the appropriate time indicated for continued medications. Further, my patient is aware that when this acute crisis has lifted, they will be expected to return for an in-person visit and all elements of standard local and hospital guidelines in order to continue this medication. Patient Location:Home   Physician Location: Other address in [place]    HPI:    Pain is better. Appropriate analgesia with current medications regimen:N/A. Change in quality of symptoms:no. Medication side effects:not applicable . Recent diagnostic testing:none. Recent interventional procedures:Left L4 and L5 TFESI excellent relief. He has not been on anticoagulation medications to include none. The patient  has not been on herbal supplements. The patient is diabetic. Imaging:   Lumbar spine MRI 2020:  Degenerative disc disease as described above.         Spinal canal narrowing, moderate at L4-5.         Narrowing of the bilateral lateral recesses at L4-5, contacting left exiting    L4 and left descending L5 nerve roots, and contacting the right descending L5    nerve root.         Foraminal narrowing, severe at left L5-S1, moderate to severe at right L5-S1,    moderate at bilateral L4-5, mild at left L3-4. Previous treatments: Physical Therapy and medications. .        Potential Aberrant Drug-Related Behavior:    No    Urine Drug Screening:  none    OARRS report:  12/2020 consistent    Past Medical History: Reviewed    Past Surgical History: Reviewed     Home Medications: Reviewed    Allergies: Reviewed     Social History: Reviewed     REVIEW OF SYSTEMS:     Abbie Jeffries denies fever/chills, chest pain, shortness of breath, new bowel or bladder complaints. All other review of systems was negative. PHYSICAL EXAMINATION:      General:       A & O x3    HEENT:    Head:normocephalic and atraumatic  Sclera: icterus absent,     Lungs:    Breathing:Breathing Pattern: regular, no distress    Lumbar spine:    Range of motion:not done    Musculoskeletal:    SLR:not done right, not done left, sitting     Extremities:    Tremors:None bilaterally upper and lower  Intact:Yes    Neurological:     Motor:          No apparent weakness per patient         Dermatology:    Skin:no unusual rashes    Impression:    Low back pain with radiation to the Left lower extremity along the Left L4 dermatome. Lumbar spine MRI 2020:L4-5 Left paracentral disc extrusion   Plan:  Follow up on his low back pain radiating down the Left lower extremity. Patient is s/p Left L4 and L5 TFESI with excellent relief in his pain, will repeat as needed. Hold off on starting Gabapentin. Continue with OTC pain medications. Francisco Javier Liuri OARRS report reviewed 12/2020. Patient encouraged to stay active and to lose weight. Treatment plan discussed with the patient. Patient will follow up as needed. We discussed with the patient that combining opioids, benzodiazepines, alcohol, illicit drugs or sleep aids increases the risk of respiratory depression including death. We discussed that these medications may cause drowsiness, sedation or dizziness and have counseled the patient not to drive or operate machinery. We have discussed that these medications will be prescribed only by one provider. We have discussed with the patient about age related risk factors and have thoroughly discussed the importance of taking these medications as prescribed. The patient verbalizes understanding. Patient advised regarding steps to help prevent the spread of COVID-19   SOURCE - https://franko-davidson.info/. html     1-Stay home except to get medical care  2-Clean your hands often for atleast 20 seconds, avoid touching: Avoid touching your eyes, nose, and mouth with unwashed hands. 3-Seek medical attention: Seek prompt medical attention if your illness is worsening (e.g., difficulty breathing). Call you doctor first.  3-Wear a facemask if you are sick   4-Cover your coughs and sneezes         I affirm this is a Patient Initiated Episode with an established Patient who has not had a related appointment within my department in the past 7 days or scheduled within the next 24 hours.     Cc: Referring physician

## 2020-12-18 NOTE — PROGRESS NOTES
Cami Oneal was read the following message We want to confirm that, for purposes of billing, this is a virtual visit with your provider for which we will submit a claim for reimbursement with your insurance company. You will be responsible for any copays, coinsurance amounts or other amounts not covered by your insurance company. If you do not accept this, unfortunately we will not be able to schedule or proceed with a virtual visit with the provider. Do you accept? Rohan responded Yes .

## 2020-12-28 ENCOUNTER — HOSPITAL ENCOUNTER (EMERGENCY)
Age: 60
Discharge: HOME | End: 2020-12-28
Attending: EMERGENCY MEDICINE
Payer: MEDICAID

## 2020-12-28 VITALS
OXYGEN SATURATION: 98 % | HEART RATE: 76 BPM | BODY MASS INDEX: 32.47 KG/M2 | RESPIRATION RATE: 16 BRPM | WEIGHT: 202 LBS | DIASTOLIC BLOOD PRESSURE: 84 MMHG | TEMPERATURE: 98.4 F | SYSTOLIC BLOOD PRESSURE: 153 MMHG | HEIGHT: 66 IN

## 2020-12-28 LAB
ANION GAP SERPL CALCULATED.3IONS-SCNC: 17 MMOL/L (ref 7–16)
BACTERIA: ABNORMAL /HPF
BILIRUBIN URINE: NEGATIVE
BLOOD, URINE: ABNORMAL
BUN BLDV-MCNC: 19 MG/DL (ref 8–23)
CALCIUM SERPL-MCNC: 9.8 MG/DL (ref 8.6–10.2)
CHLORIDE BLD-SCNC: 102 MMOL/L (ref 98–107)
CLARITY: CLEAR
CO2: 18 MMOL/L (ref 22–29)
COLOR: YELLOW
CREAT SERPL-MCNC: 1.1 MG/DL (ref 0.7–1.2)
EKG ATRIAL RATE: 91 BPM
EKG P AXIS: 52 DEGREES
EKG P-R INTERVAL: 128 MS
EKG Q-T INTERVAL: 356 MS
EKG QRS DURATION: 84 MS
EKG QTC CALCULATION (BAZETT): 437 MS
EKG R AXIS: 60 DEGREES
EKG T AXIS: 53 DEGREES
EKG VENTRICULAR RATE: 91 BPM
EPITHELIAL CELLS, UA: ABNORMAL /HPF
GFR AFRICAN AMERICAN: >60
GFR NON-AFRICAN AMERICAN: >60 ML/MIN/1.73
GLUCOSE BLD-MCNC: 207 MG/DL (ref 74–99)
GLUCOSE URINE: 500 MG/DL
HCT VFR BLD CALC: 51.6 % (ref 37–54)
HEMOGLOBIN: 17.2 G/DL (ref 12.5–16.5)
KETONES, URINE: 15 MG/DL
LACTIC ACID: 2.4 MMOL/L (ref 0.5–2.2)
LEUKOCYTE ESTERASE, URINE: NEGATIVE
LIPASE: 80 U/L (ref 13–60)
MCH RBC QN AUTO: 30.6 PG (ref 26–35)
MCHC RBC AUTO-ENTMCNC: 33.3 % (ref 32–34.5)
MCV RBC AUTO: 91.7 FL (ref 80–99.9)
NITRITE, URINE: NEGATIVE
PDW BLD-RTO: 12.1 FL (ref 11.5–15)
PH UA: 5.5 (ref 5–9)
PLATELET # BLD: 244 E9/L (ref 130–450)
PMV BLD AUTO: 12.3 FL (ref 7–12)
POTASSIUM SERPL-SCNC: 4.4 MMOL/L (ref 3.5–5)
PROTEIN UA: NEGATIVE MG/DL
RBC # BLD: 5.63 E12/L (ref 3.8–5.8)
RBC UA: ABNORMAL /HPF (ref 0–2)
SODIUM BLD-SCNC: 137 MMOL/L (ref 132–146)
SPECIFIC GRAVITY UA: 1.01 (ref 1–1.03)
TROPONIN: <0.01 NG/ML (ref 0–0.03)
UROBILINOGEN, URINE: 0.2 E.U./DL
WBC # BLD: 14.9 E9/L (ref 4.5–11.5)
WBC UA: ABNORMAL /HPF (ref 0–5)

## 2020-12-28 PROCEDURE — 84484 ASSAY OF TROPONIN QUANT: CPT

## 2020-12-28 PROCEDURE — 83605 ASSAY OF LACTIC ACID: CPT

## 2020-12-28 PROCEDURE — 85027 COMPLETE CBC AUTOMATED: CPT

## 2020-12-28 PROCEDURE — 80048 BASIC METABOLIC PNL TOTAL CA: CPT

## 2020-12-28 PROCEDURE — 93005 ELECTROCARDIOGRAM TRACING: CPT | Performed by: EMERGENCY MEDICINE

## 2020-12-28 PROCEDURE — 2580000003 HC RX 258: Performed by: EMERGENCY MEDICINE

## 2020-12-28 PROCEDURE — 83690 ASSAY OF LIPASE: CPT

## 2020-12-28 PROCEDURE — 99283 EMERGENCY DEPT VISIT LOW MDM: CPT

## 2020-12-28 PROCEDURE — 6370000000 HC RX 637 (ALT 250 FOR IP): Performed by: EMERGENCY MEDICINE

## 2020-12-28 PROCEDURE — 81001 URINALYSIS AUTO W/SCOPE: CPT

## 2020-12-28 RX ORDER — 0.9 % SODIUM CHLORIDE 0.9 %
1000 INTRAVENOUS SOLUTION INTRAVENOUS ONCE
Status: COMPLETED | OUTPATIENT
Start: 2020-12-28 | End: 2020-12-28

## 2020-12-28 RX ORDER — LOPERAMIDE HYDROCHLORIDE 2 MG/1
2 CAPSULE ORAL ONCE
Status: COMPLETED | OUTPATIENT
Start: 2020-12-28 | End: 2020-12-28

## 2020-12-28 RX ORDER — ASPIRIN 81 MG/1
324 TABLET, CHEWABLE ORAL ONCE
Status: COMPLETED | OUTPATIENT
Start: 2020-12-28 | End: 2020-12-28

## 2020-12-28 RX ADMIN — SODIUM CHLORIDE 1000 ML: 9 INJECTION, SOLUTION INTRAVENOUS at 11:46

## 2020-12-28 RX ADMIN — ASPIRIN 81 MG CHEWABLE TABLET 324 MG: 81 TABLET CHEWABLE at 13:47

## 2020-12-28 RX ADMIN — SODIUM CHLORIDE 1000 ML: 9 INJECTION, SOLUTION INTRAVENOUS at 13:47

## 2020-12-28 RX ADMIN — LOPERAMIDE HYDROCHLORIDE 2 MG: 2 CAPSULE ORAL at 15:27

## 2020-12-28 ASSESSMENT — ENCOUNTER SYMPTOMS
DIARRHEA: 1
VOMITING: 0
COUGH: 0
ABDOMINAL PAIN: 0
NAUSEA: 0
SHORTNESS OF BREATH: 0
DIFFICULTY BREATHING: 0
CONSTIPATION: 0

## 2020-12-28 ASSESSMENT — PAIN DESCRIPTION - LOCATION: LOCATION: GENERALIZED

## 2020-12-28 ASSESSMENT — PAIN DESCRIPTION - DESCRIPTORS: DESCRIPTORS: ACHING;CRAMPING

## 2020-12-28 ASSESSMENT — PAIN DESCRIPTION - FREQUENCY: FREQUENCY: INTERMITTENT

## 2020-12-28 ASSESSMENT — PAIN DESCRIPTION - PAIN TYPE: TYPE: ACUTE PAIN

## 2020-12-28 NOTE — ED PROVIDER NOTES
Appearance: Normal appearance. He is well-developed and normal weight. He is not ill-appearing or toxic-appearing. HENT:      Head: Normocephalic and atraumatic. Nose: Nose normal.      Mouth/Throat:      Mouth: Mucous membranes are dry. Pharynx: Oropharynx is clear. Eyes:      Extraocular Movements: Extraocular movements intact. Conjunctiva/sclera: Conjunctivae normal.      Pupils: Pupils are equal, round, and reactive to light. Neck:      Musculoskeletal: Normal range of motion and neck supple. No neck rigidity. Cardiovascular:      Rate and Rhythm: Normal rate and regular rhythm. Pulses: Normal pulses. Heart sounds: Normal heart sounds. No murmur. Pulmonary:      Effort: Pulmonary effort is normal. No respiratory distress. Breath sounds: Normal breath sounds. No wheezing or rales. Abdominal:      General: Bowel sounds are normal.      Palpations: Abdomen is soft. Tenderness: There is no abdominal tenderness. There is no guarding or rebound. Musculoskeletal:         General: No tenderness. Right lower leg: No edema. Left lower leg: No edema. Skin:     General: Skin is warm and dry. Findings: Bruising (to the base of the penis with a 2 mm abrasion) present. Neurological:      General: No focal deficit present. Mental Status: He is alert and oriented to person, place, and time. Mental status is at baseline. Cranial Nerves: No cranial nerve deficit. Sensory: No sensory deficit. Motor: No weakness. Coordination: Coordination normal.      Gait: Gait normal.   Psychiatric:         Mood and Affect: Mood normal.         Behavior: Behavior normal.         Thought Content:  Thought content normal.         Judgment: Judgment normal.         Procedures    [facility]    ED Course as of Dec 28 2038   Mon Dec 28, 2020 Past Medical History:  has a past medical history of Cervical radiculopathy, Degeneration, intervertebral disc, cervical, Diabetes mellitus ([facility] [address] Headache(784.0), Hyperlipidemia, Hypertension, Low back pain radiating to left lower extremity, Peptic ulcer, Spinal stenosis, and Type 2 diabetes mellitus without complication, without long-term current use of insulin ([facility] [address] Past Surgical History:  has a past surgical history that includes Cholecystectomy, laparoscopic (2010); lymph node biopsy; back surgery; Anus surgery (Feb 2013); and Nerve Block (Left, 12/14/2020). Social History:  reports that he quit smoking about 18 years ago. He has a 37.50 pack-year smoking history. He has never used smokeless tobacco. He reports current alcohol use. He reports that he does not use drugs. Family History: family history includes Cancer in his father; Heart Disease in his brother and father; Heart Surgery in his father; High Cholesterol in his brother and sister; Hypertension in his father and mother. The patients home medications have been reviewed.     Allergies: Lopid [gemfibrozil]    -------------------------------------------------- RESULTS -------------------------------------------------  Labs:  Results for orders placed or performed during the hospital encounter of 46/66/40   Basic metabolic panel   Result Value Ref Range    Sodium 137 132 - 146 mmol/L    Potassium 4.4 3.5 - 5.0 mmol/L    Chloride 102 98 - 107 mmol/L    CO2 18 (L) 22 - 29 mmol/L    Anion Gap 17 (H) 7 - 16 mmol/L    Glucose 207 (H) 74 - 99 mg/dL    BUN 19 8 - 23 mg/dL    CREATININE 1.1 0.7 - 1.2 mg/dL    GFR Non-African American >60 >=60 mL/min/1.73    GFR African American >60     Calcium 9.8 8.6 - 10.2 mg/dL   CBC   Result Value Ref Range    WBC 14.9 (H) 4.5 - 11.5 E9/L    RBC 5.63 3.80 - 5.80 E12/L    Hemoglobin 17.2 (H) 12.5 - 16.5 g/dL    Hematocrit 51.6 37.0 - 54.0 %    MCV 91.7 80.0 - 99.9 fL    MCH 30.6 26.0 - 35.0 pg MCHC 33.3 32.0 - 34.5 %    RDW 12.1 11.5 - 15.0 fL    Platelets 995 680 - 772 E9/L    MPV 12.3 (H) 7.0 - 12.0 fL   Troponin   Result Value Ref Range    Troponin <0.01 0.00 - 0.03 ng/mL   Urinalysis with Microscopic   Result Value Ref Range    Color, UA Yellow Straw/Yellow    Clarity, UA Clear Clear    Glucose, Ur 500 (A) Negative mg/dL    Bilirubin Urine Negative Negative    Ketones, Urine 15 (A) Negative mg/dL    Specific Gravity, UA 1.015 1.005 - 1.030    Blood, Urine TRACE (A) Negative    pH, UA 5.5 5.0 - 9.0    Protein, UA Negative Negative mg/dL    Urobilinogen, Urine 0.2 <2.0 E.U./dL    Nitrite, Urine Negative Negative    Leukocyte Esterase, Urine Negative Negative    WBC, UA 0-1 0 - 5 /HPF    RBC, UA 0-1 0 - 2 /HPF    Epithelial Cells, UA RARE /HPF    Bacteria, UA NONE SEEN None Seen /HPF   Lactic Acid, Plasma   Result Value Ref Range    Lactic Acid 2.4 (H) 0.5 - 2.2 mmol/L   Lipase   Result Value Ref Range    Lipase 80 (H) 13 - 60 U/L   EKG 12 Lead   Result Value Ref Range    Ventricular Rate 91 BPM    Atrial Rate 91 BPM    P-R Interval 128 ms    QRS Duration 84 ms    Q-T Interval 356 ms    QTc Calculation (Bazett) 437 ms    P Axis 52 degrees    R Axis 60 degrees    T Axis 53 degrees       Radiology:  No orders to display       ------------------------- NURSING NOTES AND VITALS REVIEWED ---------------------------  Date / Time Roomed:  12/28/2020 11:20 AM  ED Bed Assignment:  13/13    The nursing notes within the ED encounter and vital signs as below have been reviewed.    BP (!) 153/84   Pulse 76   Temp 98.4 °F (36.9 °C) (Oral)   Resp 16   Ht 5' 6\" (1.676 m)   Wt 202 lb (91.6 kg)   SpO2 98%   BMI 32.60 kg/m²   Oxygen Saturation Interpretation: Normal      ------------------------------------------ PROGRESS NOTES ------------------------------------------ I have spoken with the patient and discussed todays results, in addition to providing specific details for the plan of care and counseling regarding the diagnosis and prognosis. Their questions are answered at this time and they are agreeable with the plan. I discussed at length with them reasons for immediate return here for re evaluation. They will followup with primary care by calling their office tomorrow. Patient feeling improved at time of discharge. He understands reasons to return and will continue fluid intake and monitoring of his blood sugar.  --------------------------------- ADDITIONAL PROVIDER NOTES ---------------------------------  At this time the patient is without objective evidence of an acute process requiring hospitalization or inpatient management. They have remained hemodynamically stable throughout their entire ED visit and are stable for discharge with outpatient follow-up. The plan has been discussed in detail and they are aware of the specific conditions for emergent return, as well as the importance of follow-up. Discharge Medication List as of 12/28/2020  3:35 PM          Diagnosis:  1. Diarrhea, unspecified type    2. Syncope and collapse        Disposition:  Patient's disposition: Discharge to home  Patient's condition is stable.          Myra Bowles DO  12/28/20 2038

## 2020-12-29 NOTE — PROGRESS NOTES
Atif,    He no longer has the diarrhea I told him to hold the metformin if dehydrated he stated he already took it  This morning informed pt to drink more water.

## 2020-12-30 LAB
EKG ATRIAL RATE: 75 BPM
EKG P AXIS: 49 DEGREES
EKG P-R INTERVAL: 140 MS
EKG Q-T INTERVAL: 372 MS
EKG QRS DURATION: 80 MS
EKG QTC CALCULATION (BAZETT): 415 MS
EKG R AXIS: 71 DEGREES
EKG T AXIS: 40 DEGREES
EKG VENTRICULAR RATE: 75 BPM

## 2021-01-19 ENCOUNTER — OFFICE VISIT (OUTPATIENT)
Dept: PAIN MANAGEMENT | Age: 61
End: 2021-01-19
Payer: COMMERCIAL

## 2021-01-19 ENCOUNTER — PREP FOR PROCEDURE (OUTPATIENT)
Dept: PAIN MANAGEMENT | Age: 61
End: 2021-01-19

## 2021-01-19 VITALS
RESPIRATION RATE: 18 BRPM | OXYGEN SATURATION: 98 % | WEIGHT: 189 LBS | HEIGHT: 66 IN | SYSTOLIC BLOOD PRESSURE: 114 MMHG | DIASTOLIC BLOOD PRESSURE: 76 MMHG | BODY MASS INDEX: 30.37 KG/M2 | HEART RATE: 67 BPM | TEMPERATURE: 96 F

## 2021-01-19 DIAGNOSIS — M47.816 LUMBAR FACET ARTHROPATHY: ICD-10-CM

## 2021-01-19 DIAGNOSIS — M51.9 LUMBAR DISC DISORDER: ICD-10-CM

## 2021-01-19 DIAGNOSIS — M54.16 LUMBAR RADICULOPATHY: ICD-10-CM

## 2021-01-19 DIAGNOSIS — M48.061 SPINAL STENOSIS OF LUMBAR REGION WITHOUT NEUROGENIC CLAUDICATION: ICD-10-CM

## 2021-01-19 DIAGNOSIS — M48.061 STENOSIS OF LATERAL RECESS OF LUMBAR SPINE: Primary | ICD-10-CM

## 2021-01-19 DIAGNOSIS — M48.061 FORAMINAL STENOSIS OF LUMBAR REGION: ICD-10-CM

## 2021-01-19 DIAGNOSIS — M47.816 LUMBAR SPONDYLOSIS: ICD-10-CM

## 2021-01-19 DIAGNOSIS — G89.4 CHRONIC PAIN SYNDROME: ICD-10-CM

## 2021-01-19 PROCEDURE — G8484 FLU IMMUNIZE NO ADMIN: HCPCS | Performed by: PAIN MEDICINE

## 2021-01-19 PROCEDURE — G8417 CALC BMI ABV UP PARAM F/U: HCPCS | Performed by: PAIN MEDICINE

## 2021-01-19 PROCEDURE — G8427 DOCREV CUR MEDS BY ELIG CLIN: HCPCS | Performed by: PAIN MEDICINE

## 2021-01-19 PROCEDURE — 1036F TOBACCO NON-USER: CPT | Performed by: PAIN MEDICINE

## 2021-01-19 PROCEDURE — 99213 OFFICE O/P EST LOW 20 MIN: CPT | Performed by: PAIN MEDICINE

## 2021-01-19 PROCEDURE — 3017F COLORECTAL CA SCREEN DOC REV: CPT | Performed by: PAIN MEDICINE

## 2021-01-19 RX ORDER — GABAPENTIN 300 MG/1
300 CAPSULE ORAL 2 TIMES DAILY
Qty: 60 CAPSULE | Refills: 0 | Status: SHIPPED
Start: 2021-01-19 | End: 2021-02-16 | Stop reason: SINTOL

## 2021-01-19 NOTE — PROGRESS NOTES
Do you currently have any of the following:    Fever: No  Headache:  No  Cough: No  Shortness of breath: No  Exposed to anyone with these symptoms: Palma Patel presents to the [facility] on 1/19/2021. Washington Pinto is complaining of pain lower back left hip and down leg. The pain is constant. The pain is described as throbbing, shooting, stabbing and sharp. Pain is rated on his best day at a 2, on his worst day at a 9, and on average at a 6 on the VAS scale. He took his last dose of Neurontin, Motrin and Tylenol today. Washington Pinto does not have issues with constipation. Any procedures since your last visit: Yes, with 100 % relief. Ten days of relief. He is  on NSAIDS and  is not on anticoagulation medications to include none and is managed by . Pacemaker or defibrillator: No Physician managing device is . Medication Contract and Consent for Opioid Use Documents Filed      No documents found                   /76   Pulse 67   Temp 96 °F (35.6 °C) (Infrared)   Resp 18   Ht 5' 6\" (1.676 m)   Wt 189 lb (85.7 kg)   SpO2 98%   BMI 30.51 kg/m²      No LMP for male patient.

## 2021-01-19 NOTE — PROGRESS NOTES
[facility]  [address] [facility], [address] [place] [address]  [phone]    Follow up Note      Coleen Maldonado     Date of Visit:  1/19/2021    CC:  Patient presents for follow up   Chief Complaint   Patient presents with    Follow-up     lower back hip and down left leg    Follow Up After Procedure     Left Transforaminal epidural steroid injection under fluoroscopic guidance at foraminal level L4 and L5 (#1). HPI:    Follow up on his low back pain with complaints of increased pain radiating down the Left lower extremity. Appropriate analgesia with current medications regimen:N/A. Change in quality of symptoms:no. Medication side effects:not applicable . Recent diagnostic testing:none. Recent interventional procedures:none     He has not been on anticoagulation medications to include none. The patient  has not been on herbal supplements.  The patient is diabetic.     Imaging:   Lumbar spine MRI 2020:  Degenerative disc disease as described above.         Spinal canal narrowing, moderate at L4-5.         Narrowing of the bilateral lateral recesses at L4-5, contacting left exiting    L4 and left descending L5 nerve roots, and contacting the right descending L5    nerve root.         Foraminal narrowing, severe at left L5-S1, moderate to severe at right L5-S1,    moderate at bilateral L4-5, mild at left L3-4.       Previous treatments: Physical Therapy and medications. .                                         Potential Aberrant Drug-Related Behavior:    No     Urine Drug Screening:  none     OARRS report:  01/2020 consistent    Opioid Agreement:  Date enacted:N/A  Renewal date:N/A    Past Medical History:   Diagnosis Date    Cervical radiculopathy     Bilateral upper extemities    Degeneration, intervertebral disc, cervical     C4-C5 through C6-C7 levels    Diabetes mellitus ([address]     Headache(784.0)     Hyperlipidemia     Hypertension  Low back pain radiating to left lower extremity 2/17/2020    Peptic ulcer 1/21/2020    Spinal stenosis     Congenital     Type 2 diabetes mellitus without complication, without long-term current use of insulin ([facility] [address] 8/15/2016     Past Surgical History:   Procedure Laterality Date    ANUS SURGERY  Feb 2013    Excision Rianna-anal condyloma, anoscopy    BACK SURGERY      epidural    CHOLECYSTECTOMY, LAPAROSCOPIC  2010    LYMPH NODE BIOPSY      benign    NERVE BLOCK Left 12/14/2020    LEFT L4 AND L5 TRANSFORAMINAL EPIDURAL STEROID INJECTION (CPT 42095) performed by Berlin Duran MD at [address] [place] [address]     Prior to Admission medications    Medication Sig Start Date End Date Taking? Authorizing Provider   gabapentin (NEURONTIN) 300 MG capsule Take 1 capsule by mouth 2 times daily for 30 days. Take one capsule QHS for three days then one capsule BID as tolerated 1/19/21 2/18/21 Yes Berlin Duran MD   Blood Glucose Monitoring Suppl [facility] BLOOD GLUCOSE METER) w/Device KIT 1 Device by Does not apply route daily 12/8/20  Yes SUZIE Can CNP   blood glucose monitor strips Test 2 times a day & as needed for symptoms of irregular blood glucose.  12/7/20  Yes SUZIE Can CNP   metFORMIN (GLUCOPHAGE-XR) 500 MG extended release tablet Take 1 tablet by mouth 2 times daily (with meals) 11/9/20  Yes SUZIE Can CNP   empagliflozin (JARDIANCE) 25 MG tablet Take 25 mg by mouth daily 11/9/20  Yes SUZIE Can CNP   Dulaglutide 0.75 MG/0.5ML SOPN Inject 0.75 mg into the skin once a week 11/9/20  Yes SUZIE Luna CNP   zoster recombinant adjuvanted vaccine [facility]) 50 MCG/0.5ML SUSR injection Inject 0.5 mLs into the muscle See Admin Instructions 1 dose now and repeat in 2-6 months 11/6/20 5/5/21 Yes SUZIE Luna CNP   naproxen (NAPROSYN) 500 MG tablet Take 1 tablet by mouth 2 times daily (with meals) Patient taking differently: Take 500 mg by mouth as needed  20  Yes SUZIE Phan CNP   lisinopril (PRINIVIL;ZESTRIL) 10 MG tablet Take 1 tablet by mouth daily 20  Yes SUZIE Phan CNP   atenolol (TENORMIN) 25 MG tablet TAKE ONE TABLET BY MOUTH ONCE A DAY. 20  Yes SUZIE Phan CNP   atorvastatin (LIPITOR) 40 MG tablet Take 1 tablet by mouth daily 20  Yes SUZIE Phan CNP   fluticasone (FLONASE) 50 MCG/ACT nasal spray 1 spray by Nasal route daily 17  Yes SUZIE Phan CNP   Multiple Vitamin (MULTI-VITAMIN) TABS Take 1 tablet by mouth Daily.    Yes Historical Provider, MD       Allergies   Allergen Reactions    Lopid [Gemfibrozil] Other (See Comments)     Enlarged prostate       Social History     Socioeconomic History    Marital status:      Spouse name: Not on file    Number of children: Not on file    Years of education: Not on file    Highest education level: Not on file   Occupational History    Not on file   Social Needs    Financial resource strain: Not on file    Food insecurity     Worry: Not on file     Inability: Not on file    Transportation needs     Medical: Not on file     Non-medical: Not on file   Tobacco Use    Smoking status: Former Smoker     Packs/day: 1.50     Years: 25.00     Pack years: 45.53     Quit date: 2002     Years since quittin.9    Smokeless tobacco: Never Used   Substance and Sexual Activity    Alcohol use: Yes     Comment: Social     Drug use: No    Sexual activity: Not on file   Lifestyle    Physical activity     Days per week: Not on file     Minutes per session: Not on file    Stress: Not on file   Relationships    Social connections     Talks on phone: Not on file     Gets together: Not on file     Attends Buddhism service: Not on file     Active member of club or organization: Not on file     Attends meetings of clubs or organizations: Not on file Relationship status: Not on file    Intimate partner violence     Fear of current or ex partner: Not on file     Emotionally abused: Not on file     Physically abused: Not on file     Forced sexual activity: Not on file   Other Topics Concern    Not on file   Social History Narrative    Not on file     Family History   Problem Relation Age of Onset    Hypertension Mother     Hypertension Father     Cancer Father         Prostate    Heart Surgery Father     Heart Disease Father     High Cholesterol Sister     High Cholesterol Brother     Heart Disease Brother      REVIEW OF SYSTEMS:     Dirk Brumfiled denies fever/chills, chest pain, shortness of breath, new bowel or bladder complaints. All other review of systems was negative. PHYSICAL EXAMINATION:      /76   Pulse 67   Temp 96 °F (35.6 °C) (Infrared)   Resp 18   Ht 5' 6\" (1.676 m)   Wt 189 lb (85.7 kg)   SpO2 98%   BMI 30.51 kg/m²     General:       General appearance:   pleasant and well-hydrated. , in moderate discomfort and A & O x3  Build:Normal Weight     HEENT:     Head:normocephalic and atraumatic  Sclera: icterus absent,      Lungs:     Breathing:Breathing Pattern: regular, no distress     Abdomen:     Shape:non-distended and normal  Tenderness:none     Lumbar spine:     Spine inspection:normal   CVA tenderness:No   Palpation:tenderness paravertebral muscles, facet loading, left, right, positive and tenderness. Range of motion:abnormal moderately Lateral bending, flexion, extension rotation bilateral and is  painful.     Musculoskeletal:     Trigger points in Paraveteral:absent bilaterally  SI joint tenderness:negative right, negative left  SLR:negative right, positive left, sitting      Extremities:     Tremors:None bilaterally upper and lower  Range of motion:pain with internal rotation of hips negative. Intact:Yes  Edema:Normal     Neurological:     Sensory:normal to light touch bilateral lower extremities. Motor:                             Right Quadriceps5/5          Left Quadriceps5/5           Right Gastrocnemius5/5    Left Gastrocnemius5/5  Right Ant Tibialis5/5  Left Ant Tibialis5/5  Reflexes:    Right Quadriceps reflex2+  Left Quadriceps reflex1+  Right Achilles reflex2+  Left Achilles reflex2+  Gait:normal     Dermatology:     Skin:no unusual rashes and no skin lesions    Impression:    Low back pain with radiation to the Left lower extremity along the Left L4 dermatome. Lumbar spine MRI 2020:L4-5 Left paracentral disc extrusion   Plan:  Follow up on his low back pain radiating down the Left lower extremity with complaints of increased pain radiating down the Left lower extremity. Patient had excellent relief with Left L4 and L5 TFESI for 4-5 weeks, discussed repeating patient agrees. Will consider neurosurgery referral   Will start patient on Gabapentin 300 mg to be titrated to 600 mg daily. Continue with OTC pain medications. Zaida Mcclelland OARRS report reviewed 01/2021. Patient encouraged to stay active and to lose weight. Treatment plan discussed with the patient including medications and procedure side effects. We discussed with the patient that combining opioids, benzodiazepines, alcohol, illicit drugs or sleep aids increases the risk of respiratory depression including death. We discussed that these medications may cause drowsiness, sedation or dizziness and have counseled the patient not to drive or operate machinery. We have discussed that these medications will be prescribed only by one provider. We have discussed with the patient about age related risk factors and have thoroughly discussed the importance of taking these medications as prescribed. The patient verbalizes understanding. ccreferring emory Greco M.D.

## 2021-01-26 ENCOUNTER — ANESTHESIA EVENT (OUTPATIENT)
Dept: OPERATING ROOM | Age: 61
End: 2021-01-26
Payer: COMMERCIAL

## 2021-01-26 ENCOUNTER — HOSPITAL ENCOUNTER (OUTPATIENT)
Age: 61
Discharge: HOME | End: 2021-01-28
Payer: COMMERCIAL

## 2021-01-26 DIAGNOSIS — M54.16 LUMBAR RADICULOPATHY: ICD-10-CM

## 2021-01-27 LAB — SARS-COV-2, PCR: NOT DETECTED

## 2021-01-27 PROCEDURE — U0003 INFECTIOUS AGENT DETECTION BY NUCLEIC ACID (DNA OR RNA); SEVERE ACUTE RESPIRATORY SYNDROME CORONAVIRUS 2 (SARS-COV-2) (CORONAVIRUS DISEASE [COVID-19]), AMPLIFIED PROBE TECHNIQUE, MAKING USE OF HIGH THROUGHPUT TECHNOLOGIES AS DESCRIBED BY CMS-2020-01-R: HCPCS

## 2021-01-29 ASSESSMENT — LIFESTYLE VARIABLES: SMOKING_STATUS: 0

## 2021-01-29 NOTE — ANESTHESIA PRE PROCEDURE
Department of Anesthesiology  Preprocedure Note       Name:  Anmol Lopez   Age:  61 y.o.  :  1960                                          MRN:  [id number]         Date:  2021      Surgeon: Pat Colón):  Lindsey Morfin MD    Procedure: Procedure(s):  LEFT L4 AND L5 TRANSFORAMINAL EPIDURAL STEROID INJECTION WITH SEDATION (CPT 37227)    Medications prior to admission:   Prior to Admission medications    Medication Sig Start Date End Date Taking? Authorizing Provider   gabapentin (NEURONTIN) 300 MG capsule Take 1 capsule by mouth 2 times daily for 30 days. Take one capsule QHS for three days then one capsule BID as tolerated 21  Lindsey Morfin MD   Blood Glucose Monitoring Suppl [facility] BLOOD GLUCOSE METER) w/Device KIT 1 Device by Does not apply route daily 20   Lodaraa Sidney APRN - CNP   blood glucose monitor strips Test 2 times a day & as needed for symptoms of irregular blood glucose.  20   Lodaraa Sidney, APRN - CNP   metFORMIN (GLUCOPHAGE-XR) 500 MG extended release tablet Take 1 tablet by mouth 2 times daily (with meals) 20   Nadiaa Sidney, APRN - CNP   empagliflozin (JARDIANCE) 25 MG tablet Take 25 mg by mouth daily 20   Naya Little, APRN - CNP   Dulaglutide 0.75 MG/0.5ML SOPN Inject 0.75 mg into the skin once a week 20   Naya Little, APRN - CNP   zoster recombinant adjuvanted vaccine [facility]) 50 MCG/0.5ML SUSR injection Inject 0.5 mLs into the muscle See Admin Instructions 1 dose now and repeat in 2-6 months 20  Lodaraa Sidney, APRN - CNP   naproxen (NAPROSYN) 500 MG tablet Take 1 tablet by mouth 2 times daily (with meals)  Patient taking differently: Take 500 mg by mouth as needed  20   Lodaraa Sidney, APRN - CNP   lisinopril (PRINIVIL;ZESTRIL) 10 MG tablet Take 1 tablet by mouth daily 20   Nadiaa Sidney, APRN - CNP   atenolol (TENORMIN) 25 MG tablet TAKE ONE TABLET BY MOUTH ONCE A DAY. 20   Naya Little, APRN - CNP atorvastatin (LIPITOR) 40 MG tablet Take 1 tablet by mouth daily 2/17/20   SUZIE Lynch CNP   fluticasone [facility]) 50 MCG/ACT nasal spray 1 spray by Nasal route daily 6/16/17   SUZIE Lynch CNP   Multiple Vitamin (MULTI-VITAMIN) TABS Take 1 tablet by mouth Daily. Historical Provider, MD       Current medications:    No current facility-administered medications for this encounter. Current Outpatient Medications   Medication Sig Dispense Refill    gabapentin (NEURONTIN) 300 MG capsule Take 1 capsule by mouth 2 times daily for 30 days. Take one capsule QHS for three days then one capsule BID as tolerated 60 capsule 0    Blood Glucose Monitoring Suppl ([facility] BLOOD GLUCOSE METER) w/Device KIT 1 Device by Does not apply route daily 1 kit 0    blood glucose monitor strips Test 2 times a day & as needed for symptoms of irregular blood glucose. 100 strip 3    metFORMIN (GLUCOPHAGE-XR) 500 MG extended release tablet Take 1 tablet by mouth 2 times daily (with meals) 180 tablet 1    empagliflozin (JARDIANCE) 25 MG tablet Take 25 mg by mouth daily 30 tablet 6    Dulaglutide 0.75 MG/0.5ML SOPN Inject 0.75 mg into the skin once a week 4 pen 3    zoster recombinant adjuvanted vaccine (SHINGRIX) 50 MCG/0.5ML SUSR injection Inject 0.5 mLs into the muscle See Admin Instructions 1 dose now and repeat in 2-6 months 0.5 mL 1    naproxen (NAPROSYN) 500 MG tablet Take 1 tablet by mouth 2 times daily (with meals) (Patient taking differently: Take 500 mg by mouth as needed ) 60 tablet 0    lisinopril (PRINIVIL;ZESTRIL) 10 MG tablet Take 1 tablet by mouth daily 90 tablet 3    atenolol (TENORMIN) 25 MG tablet TAKE ONE TABLET BY MOUTH ONCE A DAY. 90 tablet 3    atorvastatin (LIPITOR) 40 MG tablet Take 1 tablet by mouth daily 90 tablet 3    fluticasone (FLONASE) 50 MCG/ACT nasal spray 1 spray by Nasal route daily 3 Bottle 3    Multiple Vitamin (MULTI-VITAMIN) TABS Take 1 tablet by mouth Daily. Allergies:     Allergies   Allergen Reactions    Lopid [Gemfibrozil] Other (See Comments)     Enlarged prostate       Problem List:    Patient Active Problem List   Diagnosis Code    Chronic left hip pain M25.552, G89.29    Type 2 diabetes mellitus without complication, without long-term current use of insulin ([facility]) E11.9    Benign non-nodular prostatic hyperplasia without lower urinary tract symptoms N40.0    Back skin lesion L98.9    Mass of skin of shoulder R22.30    Pain of right knee and lower leg M25.561, M79.661    Mixed hyperlipidemia E78.2    Essential hypertension I10    Somatic dysfunction of spine, lumbar M99.03    Low back pain radiating to left lower extremity M54.5, M79.605    Bone callus M25.70    Exostosis of toe M89.8X7    Corns and callus L84    Chronic pain syndrome G89.4    Lumbar disc disorder M51.9    Lumbar facet arthropathy M47.816    Lumbar radiculopathy M54.16    Lumbar spondylosis M47.816    Stenosis of lateral recess of lumbar spine M48.061    Foraminal stenosis of lumbar region M48.061       Past Medical History:        Diagnosis Date    Cervical radiculopathy     Bilateral upper extemities    Degeneration, intervertebral disc, cervical     C4-C5 through C6-C7 levels    Diabetes mellitus ([address]     Headache(784.0)     Hyperlipidemia     Hypertension     Low back pain radiating to left lower extremity 2/17/2020    Peptic ulcer 1/21/2020    Spinal stenosis     Congenital     Type 2 diabetes mellitus without complication, without long-term current use of insulin ([address] 8/15/2016       Past Surgical History:        Procedure Laterality Date    ANUS SURGERY  Feb 2013    Excision Rianna-anal condyloma, anoscopy    BACK SURGERY      epidural    CHOLECYSTECTOMY, LAPAROSCOPIC  2010    LYMPH NODE BIOPSY      benign    NERVE BLOCK Left 12/14/2020 ABGs: No results found for: PHART, PO2ART, BPZ5RQI, EGL3VIB, BEART, Z0VEWUDT     Type & Screen (If Applicable):  No results found for: LABABO, LABRH    Drug/Infectious Status (If Applicable):  No results found for: HIV, HEPCAB    COVID-19 Screening (If Applicable):   Lab Results   Component Value Date    COVID19 Not Detected 01/27/2021         Anesthesia Evaluation  Patient summary reviewed and Nursing notes reviewed no history of anesthetic complications:   Airway: Mallampati: III  TM distance: >3 FB   Neck ROM: full  Mouth opening: > = 3 FB Dental:          Pulmonary:normal exam  breath sounds clear to auscultation      (-) not a current smoker (ex 37 pk yrs)                           Cardiovascular:  Exercise tolerance: good (>4 METS),   (+) hypertension: mild, hyperlipidemia      ECG reviewed  Rhythm: regular  Rate: normal           Beta Blocker:  Dose within 24 Hrs      ROS comment: EKG=Normal sinus rhythm  Normal ECG  When compared with ECG of 28-DEC-2020 11:29,  premature ventricular complexes are no longer present  Confirmed by Jericho Almeida ([id number]) on 12/30/2020 12:18:34 PM    Pt. Denies anginal symptoms, respiratory difficulties, major physical restrictions or any recent changes in functional capacity and is at baseline. Neuro/Psych:   (+) neuromuscular disease:, headaches:,             GI/Hepatic/Renal:   (+) GERD: well controlled, PUD,           Endo/Other:    (+) Diabetes (A1C 8.4% with an am BGM of 146 mg/dL)Type II DM, poorly controlled, , .          Pt had no PAT visit        ROS comment: OA, DJD, DDD, CDD, facet arthropathy, lumbar spondylosis, radiculopathy, and chronic low back pain and somatic dysfunction Abdominal:           Vascular: negative vascular ROS. Anesthesia Plan      MAC     ASA 3     (PONV prophylaxis  C-19 negative)  Induction: intravenous. MIPS: Prophylactic antiemetics administered. Anesthetic plan and risks discussed with patient.

## 2021-02-01 ENCOUNTER — HOSPITAL ENCOUNTER (OUTPATIENT)
Age: 61
Setting detail: OUTPATIENT SURGERY
Discharge: HOME | End: 2021-02-01
Attending: PAIN MEDICINE | Admitting: PAIN MEDICINE
Payer: COMMERCIAL

## 2021-02-01 ENCOUNTER — HOSPITAL ENCOUNTER (OUTPATIENT)
Dept: OPERATING ROOM | Age: 61
Setting detail: OUTPATIENT SURGERY
Discharge: HOME | End: 2021-02-01
Attending: PAIN MEDICINE
Payer: COMMERCIAL

## 2021-02-01 ENCOUNTER — ANESTHESIA (OUTPATIENT)
Dept: OPERATING ROOM | Age: 61
End: 2021-02-01
Payer: COMMERCIAL

## 2021-02-01 VITALS
HEART RATE: 68 BPM | HEIGHT: 66 IN | WEIGHT: 192.6 LBS | RESPIRATION RATE: 16 BRPM | BODY MASS INDEX: 30.95 KG/M2 | DIASTOLIC BLOOD PRESSURE: 76 MMHG | TEMPERATURE: 97.4 F | OXYGEN SATURATION: 96 % | SYSTOLIC BLOOD PRESSURE: 118 MMHG

## 2021-02-01 VITALS
DIASTOLIC BLOOD PRESSURE: 83 MMHG | RESPIRATION RATE: 9 BRPM | OXYGEN SATURATION: 96 % | TEMPERATURE: 98.6 F | SYSTOLIC BLOOD PRESSURE: 135 MMHG

## 2021-02-01 DIAGNOSIS — M54.16 LUMBAR RADICULOPATHY: Primary | ICD-10-CM

## 2021-02-01 DIAGNOSIS — M51.36 LUMBAR DEGENERATIVE DISC DISEASE: ICD-10-CM

## 2021-02-01 LAB — METER GLUCOSE: 146 MG/DL (ref 74–99)

## 2021-02-01 PROCEDURE — 3600000005 HC SURGERY LEVEL 5 BASE: Performed by: PAIN MEDICINE

## 2021-02-01 PROCEDURE — 3209999900 FLUORO FOR SURGICAL PROCEDURES

## 2021-02-01 PROCEDURE — 2709999900 HC NON-CHARGEABLE SUPPLY: Performed by: PAIN MEDICINE

## 2021-02-01 PROCEDURE — 82962 GLUCOSE BLOOD TEST: CPT

## 2021-02-01 PROCEDURE — 7100000010 HC PHASE II RECOVERY - FIRST 15 MIN: Performed by: PAIN MEDICINE

## 2021-02-01 PROCEDURE — 6360000002 HC RX W HCPCS: Performed by: PAIN MEDICINE

## 2021-02-01 PROCEDURE — 82962 GLUCOSE BLOOD TEST: CPT | Performed by: PAIN MEDICINE

## 2021-02-01 PROCEDURE — 2580000003 HC RX 258: Performed by: ANESTHESIOLOGY

## 2021-02-01 PROCEDURE — 3600000015 HC SURGERY LEVEL 5 ADDTL 15MIN: Performed by: PAIN MEDICINE

## 2021-02-01 PROCEDURE — 7100000011 HC PHASE II RECOVERY - ADDTL 15 MIN: Performed by: PAIN MEDICINE

## 2021-02-01 PROCEDURE — 2500000003 HC RX 250 WO HCPCS: Performed by: PAIN MEDICINE

## 2021-02-01 PROCEDURE — 3700000001 HC ADD 15 MINUTES (ANESTHESIA): Performed by: PAIN MEDICINE

## 2021-02-01 PROCEDURE — 64484 NJX AA&/STRD TFRM EPI L/S EA: CPT | Performed by: PAIN MEDICINE

## 2021-02-01 PROCEDURE — 64483 NJX AA&/STRD TFRM EPI L/S 1: CPT | Performed by: PAIN MEDICINE

## 2021-02-01 PROCEDURE — 3700000000 HC ANESTHESIA ATTENDED CARE: Performed by: PAIN MEDICINE

## 2021-02-01 PROCEDURE — 6360000002 HC RX W HCPCS: Performed by: NURSE ANESTHETIST, CERTIFIED REGISTERED

## 2021-02-01 RX ORDER — HYDROCODONE BITARTRATE AND ACETAMINOPHEN 5; 325 MG/1; MG/1
1 TABLET ORAL
Status: DISCONTINUED | OUTPATIENT
Start: 2021-02-01 | End: 2021-02-01 | Stop reason: HOSPADM

## 2021-02-01 RX ORDER — SODIUM CHLORIDE, SODIUM LACTATE, POTASSIUM CHLORIDE, CALCIUM CHLORIDE 600; 310; 30; 20 MG/100ML; MG/100ML; MG/100ML; MG/100ML
INJECTION, SOLUTION INTRAVENOUS CONTINUOUS
Status: DISCONTINUED | OUTPATIENT
Start: 2021-02-01 | End: 2021-02-01 | Stop reason: HOSPADM

## 2021-02-01 RX ORDER — FENTANYL CITRATE 50 UG/ML
INJECTION, SOLUTION INTRAMUSCULAR; INTRAVENOUS PRN
Status: DISCONTINUED | OUTPATIENT
Start: 2021-02-01 | End: 2021-02-01 | Stop reason: SDUPTHER

## 2021-02-01 RX ORDER — MIDAZOLAM HYDROCHLORIDE 1 MG/ML
INJECTION INTRAMUSCULAR; INTRAVENOUS PRN
Status: DISCONTINUED | OUTPATIENT
Start: 2021-02-01 | End: 2021-02-01 | Stop reason: SDUPTHER

## 2021-02-01 RX ORDER — LIDOCAINE HYDROCHLORIDE 5 MG/ML
INJECTION, SOLUTION INFILTRATION; INTRAVENOUS PRN
Status: DISCONTINUED | OUTPATIENT
Start: 2021-02-01 | End: 2021-02-01 | Stop reason: ALTCHOICE

## 2021-02-01 RX ADMIN — FENTANYL CITRATE 50 MCG: 50 INJECTION, SOLUTION INTRAMUSCULAR; INTRAVENOUS at 10:44

## 2021-02-01 RX ADMIN — FENTANYL CITRATE 50 MCG: 50 INJECTION, SOLUTION INTRAMUSCULAR; INTRAVENOUS at 10:43

## 2021-02-01 RX ADMIN — SODIUM CHLORIDE, POTASSIUM CHLORIDE, SODIUM LACTATE AND CALCIUM CHLORIDE: 600; 310; 30; 20 INJECTION, SOLUTION INTRAVENOUS at 10:20

## 2021-02-01 RX ADMIN — MIDAZOLAM 2 MG: 1 INJECTION INTRAMUSCULAR; INTRAVENOUS at 10:42

## 2021-02-01 ASSESSMENT — PAIN SCALES - GENERAL
PAINLEVEL_OUTOF10: 0

## 2021-02-01 NOTE — ANESTHESIA POSTPROCEDURE EVALUATION
Department of Anesthesiology  Postprocedure Note    Patient: Yaima Pratt  MRN: [id number]  YOB: 1960  Date of evaluation: 2/1/2021  Time:  11:08 AM     Procedure Summary     Date: 02/01/21 Room / Location: [address] [place] [address] 04 / [facility]    Anesthesia Start: 1183 Anesthesia Stop: 1056    Procedure: LEFT L4 AND L5 TRANSFORAMINAL EPIDURAL STEROID INJECTION WITH SEDATION (CPT 52916) (Left ) Diagnosis: (LUMBAR RADICULOPATHY)    Surgeons: Emelia Barthel, MD Responsible Provider: Sherra Dandy, DO    Anesthesia Type: MAC ASA Status: 3          Anesthesia Type: MAC    Bradford Phase I: Bradford Score: 10    Bradford Phase II: Bradford Score: 10    Last vitals: Reviewed and per EMR flowsheets.        Anesthesia Post Evaluation    Patient location during evaluation: bedside  Patient participation: complete - patient participated  Level of consciousness: awake  Pain score: 2  Airway patency: patent  Nausea & Vomiting: no vomiting and no nausea  Complications: no  Cardiovascular status: hemodynamically stable  Respiratory status: acceptable  Hydration status: stable

## 2021-02-01 NOTE — H&P
[facility]  [address] [employer], [address] [place] [address]  [phone]    Procedure History & Physical      Madison Petit     HPI:    Patient  is here for Low back and Left lower extremity pain for Left L4 and L5 TFESI  Labs/imaging studies reviewed   All question and concerns addressed including R/B/A associated with the procedure    Past Medical History:   Diagnosis Date    Cervical radiculopathy     Bilateral upper extemities    Degeneration, intervertebral disc, cervical     C4-C5 through C6-C7 levels    Diabetes mellitus ([address]     Headache(784.0)     Hyperlipidemia     Hypertension     Low back pain radiating to left lower extremity 2/17/2020    Peptic ulcer 1/21/2020    Spinal stenosis     Congenital     Type 2 diabetes mellitus without complication, without long-term current use of insulin ([facility] [address] 8/15/2016       Past Surgical History:   Procedure Laterality Date    ANUS SURGERY  Feb 2013    Excision Rianna-anal condyloma, anoscopy    BACK SURGERY      epidural    CHOLECYSTECTOMY, LAPAROSCOPIC  2010    LYMPH NODE BIOPSY      benign    NERVE BLOCK Left 12/14/2020    LEFT L4 AND L5 TRANSFORAMINAL EPIDURAL STEROID INJECTION (CPT 18528) performed by Luis Daniel Frederick MD at [address] [place] [address]       Prior to Admission medications    Medication Sig Start Date End Date Taking? Authorizing Provider   lisinopril (PRINIVIL;ZESTRIL) 10 MG tablet Take 1 tablet by mouth daily 2/17/20  Yes SUZIE Garcia CNP   gabapentin (NEURONTIN) 300 MG capsule Take 1 capsule by mouth 2 times daily for 30 days.  Take one capsule QHS for three days then one capsule BID as tolerated 1/19/21 2/18/21  Luis Daniel Frederick MD   Blood Glucose Monitoring Suppl [facility] BLOOD GLUCOSE METER) w/Device KIT 1 Device by Does not apply route daily 12/8/20   SUZIE Chavez CNP Years: 25.00     Pack years: 45.53     Quit date: 2002     Years since quittin.9    Smokeless tobacco: Never Used   Substance and Sexual Activity    Alcohol use: Yes     Comment: Social     Drug use: No    Sexual activity: Not on file   Lifestyle    Physical activity     Days per week: Not on file     Minutes per session: Not on file    Stress: Not on file   Relationships    Social connections     Talks on phone: Not on file     Gets together: Not on file     Attends Confucianism service: Not on file     Active member of club or organization: Not on file     Attends meetings of clubs or organizations: Not on file     Relationship status: Not on file    Intimate partner violence     Fear of current or ex partner: Not on file     Emotionally abused: Not on file     Physically abused: Not on file     Forced sexual activity: Not on file   Other Topics Concern    Not on file   Social History Narrative    Not on file       Family History   Problem Relation Age of Onset    Hypertension Mother     Hypertension Father     Cancer Father         Prostate    Heart Surgery Father     Heart Disease Father     High Cholesterol Sister     High Cholesterol Brother     Heart Disease Brother          REVIEW OF SYSTEMS:    CONSTITUTIONAL:  negative for  fevers, chills, sweats and fatigue    RESPIRATORY:  negative for  dry cough, cough with sputum, dyspnea, wheezing and chest pain    CARDIOVASCULAR:  negative for chest pain, dyspnea, palpitations, syncope    GASTROINTESTINAL:  negative for nausea, vomiting, change in bowel habits, diarrhea, constipation and abdominal pain    MUSCULOSKELETAL: negative for muscle weakness    SKIN: negative for itching or rashes.     BEHAVIOR/PSYCH:  negative for poor appetite, increased appetite, decreased sleep and poor concentration    All other systems negative      PHYSICAL EXAM:

## 2021-02-01 NOTE — OP NOTE
2021    Patient: Daxa Stinson  :  1960  Age:  61 y.o. Sex:  male     PRE-OPERATIVE DIAGNOSIS: Lumbar disc displacement, lumbar neural foraminal stenosis, lumbar radiculopathy. POST-OPERATIVE DIAGNOSIS: Same. PROCEDURE: Left Transforaminal epidural steroid injection under fluoroscopic guidance at foraminal level L4 and L5 TFESI (#1). SURGEON: JOHN Bunch M.D. ANESTHESIA: MAC    ESTIMATED BLOOD LOSS: None.  ______________________________________________________________________  BRIEF HISTORY: Daxa Stinson comes in today for the first Left transforaminal epidural steroid injection under fluoroscopic guidance at foraminal level L4 and L5 . The potential complications of this procedure were discussed with him again today. He has elected to undergo the aforementioned procedure. Johnny complete History & Physical examination were reviewed in depth, a copy of which is in the chart. DESCRIPTION OF PROCEDURE:    After confirming written and informed consent, a time-out was performed and Johnny name and date of birth, the procedure to be performed as well as the plan for the location of the needle insertion were confirmed. The patient was brought into the procedure room and placed in the prone position on the fluoroscopy table. Standard monitors were placed and vital signs were observed throughout the procedure. The area of the lumbar spine was prepped with chloraprep and draped in a sterile manner. The vertebral body was identified with AP fluoroscopy. An oblique view was obtained to better visualize the inferior junction of the pedicle and transverse process . The 6 o'clock position of the pedicle was marked and identified. The skin and subcutaneous tissue were anesthetized with 0.5% lidocaine. A # 22 gauge pencil point needle was directed toward the targeted point under fluoroscopy until bone was contacted. The needle was then walked inferiorly until the neural foramen was entered . A lateral fluoroscopic view was then used to place the needle tip in the middle of the foramen. Negative aspiration was confirmed for blood and CSF and 0.5 cc of Omnipaque 240 contrast was injected at each level under live fluoroscopy. Appropriate neurograms were observed under AP fluoroscopy. Then after negative aspiration, a solution of the 2 cc of 0.5% lidocaine and 30 mg DepoMedrol was easily injected at each level. The needles were removed with constant aspiration technique. The patient back was cleaned and a bandage was placed over the needle insertion points    Disposition the patient tolerated the procedure well and there were no complications . Vital signs remained stable throughout the procedure. The patient was escorted to the recovery area where they remained until discharge and written discharge instructions for the procedure were given. Plan: Yuni Myers will return to our pain management center as scheduled.      Joseline Shaikh MD

## 2021-02-08 ENCOUNTER — TELEPHONE (OUTPATIENT)
Dept: FAMILY MEDICINE CLINIC | Age: 61
End: 2021-02-08

## 2021-02-08 DIAGNOSIS — I10 ESSENTIAL HYPERTENSION: ICD-10-CM

## 2021-02-08 DIAGNOSIS — Z12.5 SCREENING PSA (PROSTATE SPECIFIC ANTIGEN): ICD-10-CM

## 2021-02-08 DIAGNOSIS — E11.9 TYPE 2 DIABETES MELLITUS WITHOUT COMPLICATION, WITHOUT LONG-TERM CURRENT USE OF INSULIN (HCC): Primary | ICD-10-CM

## 2021-02-08 DIAGNOSIS — E78.2 MIXED HYPERLIPIDEMIA: ICD-10-CM

## 2021-02-16 ENCOUNTER — OFFICE VISIT (OUTPATIENT)
Dept: PAIN MANAGEMENT | Age: 61
End: 2021-02-16
Payer: COMMERCIAL

## 2021-02-16 VITALS
TEMPERATURE: 96.9 F | SYSTOLIC BLOOD PRESSURE: 115 MMHG | HEART RATE: 54 BPM | OXYGEN SATURATION: 98 % | WEIGHT: 188 LBS | RESPIRATION RATE: 16 BRPM | HEIGHT: 66 IN | BODY MASS INDEX: 30.22 KG/M2 | DIASTOLIC BLOOD PRESSURE: 80 MMHG

## 2021-02-16 DIAGNOSIS — G89.4 CHRONIC PAIN SYNDROME: ICD-10-CM

## 2021-02-16 DIAGNOSIS — M54.16 LUMBAR RADICULOPATHY: ICD-10-CM

## 2021-02-16 DIAGNOSIS — M48.061 FORAMINAL STENOSIS OF LUMBAR REGION: ICD-10-CM

## 2021-02-16 DIAGNOSIS — M47.816 LUMBAR SPONDYLOSIS: ICD-10-CM

## 2021-02-16 DIAGNOSIS — M47.816 LUMBAR FACET ARTHROPATHY: ICD-10-CM

## 2021-02-16 DIAGNOSIS — M51.9 LUMBAR DISC DISORDER: ICD-10-CM

## 2021-02-16 DIAGNOSIS — M48.061 STENOSIS OF LATERAL RECESS OF LUMBAR SPINE: Primary | ICD-10-CM

## 2021-02-16 DIAGNOSIS — M48.061 SPINAL STENOSIS OF LUMBAR REGION WITHOUT NEUROGENIC CLAUDICATION: ICD-10-CM

## 2021-02-16 LAB
ALBUMIN SERPL-MCNC: 4.7 G/DL (ref 3.5–5.2)
ALP BLD-CCNC: 101 U/L (ref 40–129)
ALT SERPL-CCNC: 29 U/L (ref 0–40)
ANION GAP SERPL CALCULATED.3IONS-SCNC: 19 MMOL/L (ref 7–16)
AST SERPL-CCNC: 18 U/L (ref 0–39)
BASOPHILS ABSOLUTE: 0.11 E9/L (ref 0–0.2)
BASOPHILS RELATIVE PERCENT: 1.4 % (ref 0–2)
BILIRUB SERPL-MCNC: 0.4 MG/DL (ref 0–1.2)
BUN BLDV-MCNC: 19 MG/DL (ref 8–23)
CALCIUM SERPL-MCNC: 10.2 MG/DL (ref 8.6–10.2)
CHLORIDE BLD-SCNC: 112 MMOL/L (ref 98–107)
CHOLESTEROL, TOTAL: 152 MG/DL (ref 0–199)
CO2: 19 MMOL/L (ref 22–29)
CREAT SERPL-MCNC: 1.5 MG/DL (ref 0.7–1.2)
EOSINOPHILS ABSOLUTE: 0.21 E9/L (ref 0.05–0.5)
EOSINOPHILS RELATIVE PERCENT: 2.6 % (ref 0–6)
GFR AFRICAN AMERICAN: 58
GFR NON-AFRICAN AMERICAN: 48 ML/MIN/1.73
GLUCOSE BLD-MCNC: 162 MG/DL (ref 74–99)
HBA1C MFR BLD: 7.6 % (ref 4–5.6)
HCT VFR BLD CALC: 48 % (ref 37–54)
HDLC SERPL-MCNC: 49 MG/DL
HEMOGLOBIN: 15.9 G/DL (ref 12.5–16.5)
IMMATURE GRANULOCYTES #: 0.05 E9/L
IMMATURE GRANULOCYTES %: 0.6 % (ref 0–5)
LDL CHOLESTEROL CALCULATED: 70 MG/DL (ref 0–99)
LYMPHOCYTES ABSOLUTE: 1.71 E9/L (ref 1.5–4)
LYMPHOCYTES RELATIVE PERCENT: 21.2 % (ref 20–42)
MCH RBC QN AUTO: 30.6 PG (ref 26–35)
MCHC RBC AUTO-ENTMCNC: 33.1 % (ref 32–34.5)
MCV RBC AUTO: 92.3 FL (ref 80–99.9)
MONOCYTES ABSOLUTE: 0.64 E9/L (ref 0.1–0.95)
MONOCYTES RELATIVE PERCENT: 7.9 % (ref 2–12)
NEUTROPHILS ABSOLUTE: 5.34 E9/L (ref 1.8–7.3)
NEUTROPHILS RELATIVE PERCENT: 66.3 % (ref 43–80)
PDW BLD-RTO: 12.3 FL (ref 11.5–15)
PLATELET # BLD: 209 E9/L (ref 130–450)
PMV BLD AUTO: 12.6 FL (ref 7–12)
POTASSIUM SERPL-SCNC: 5.1 MMOL/L (ref 3.5–5)
PROSTATE SPECIFIC ANTIGEN: 0.39 NG/ML (ref 0–4)
RBC # BLD: 5.2 E12/L (ref 3.8–5.8)
SODIUM BLD-SCNC: 150 MMOL/L (ref 132–146)
TOTAL PROTEIN: 7.7 G/DL (ref 6.4–8.3)
TRIGL SERPL-MCNC: 163 MG/DL (ref 0–149)
VLDLC SERPL CALC-MCNC: 33 MG/DL
WBC # BLD: 8.1 E9/L (ref 4.5–11.5)

## 2021-02-16 PROCEDURE — G8417 CALC BMI ABV UP PARAM F/U: HCPCS | Performed by: PAIN MEDICINE

## 2021-02-16 PROCEDURE — G8484 FLU IMMUNIZE NO ADMIN: HCPCS | Performed by: PAIN MEDICINE

## 2021-02-16 PROCEDURE — 99213 OFFICE O/P EST LOW 20 MIN: CPT

## 2021-02-16 PROCEDURE — 99213 OFFICE O/P EST LOW 20 MIN: CPT | Performed by: PAIN MEDICINE

## 2021-02-16 PROCEDURE — G8427 DOCREV CUR MEDS BY ELIG CLIN: HCPCS | Performed by: PAIN MEDICINE

## 2021-02-16 PROCEDURE — 3017F COLORECTAL CA SCREEN DOC REV: CPT | Performed by: PAIN MEDICINE

## 2021-02-16 PROCEDURE — 1036F TOBACCO NON-USER: CPT | Performed by: PAIN MEDICINE

## 2021-02-16 NOTE — PROGRESS NOTES
[address] [facility], [address] [place] [address] Chris  [phone]    Follow up Note      Anmol Lopez     Date of Visit:  2/16/2021    CC:  Patient presents for follow up   Chief Complaint   Patient presents with    Follow-up     lower back tingling in left leg    Follow Up After Procedure     Left Transforaminal epidural steroid injection under fluoroscopic guidance at foraminal level L4 and L5 TFESI (#1). HPI:    Follow up on his low back pain radiating down the Left lower extremity. Appropriate analgesia with current medications regimen:N/A. Change in quality of symptoms:no. Medication side effects:not applicable . Recent diagnostic testing:none. Recent interventional procedures:Left L4 and L5 TFESI excellent.     He has not been on anticoagulation medications to include none. The patient  has not been on herbal supplements.  The patient is diabetic.     Imaging:   Lumbar spine MRI 2020:  Degenerative disc disease as described above.         Spinal canal narrowing, moderate at L4-5.         Narrowing of the bilateral lateral recesses at L4-5, contacting left exiting    L4 and left descending L5 nerve roots, and contacting the right descending L5    nerve root.         Foraminal narrowing, severe at left L5-S1, moderate to severe at right L5-S1,    moderate at bilateral L4-5, mild at left L3-4.       Previous treatments: Physical Therapy and medications. .                                         Potential Aberrant Drug-Related Behavior:    No     Urine Drug Screening:  none     OARRS report:  02/2021 consistent    Opioid Agreement:  Date enacted:N/A  Renewal date:N/A    Past Medical History:   Diagnosis Date    Cervical radiculopathy     Bilateral upper extemities    Degeneration, intervertebral disc, cervical     C4-C5 through C6-C7 levels    Diabetes mellitus ([facility] [address]     Headache(784.0)     Hyperlipidemia     Hypertension     Low back pain radiating to left lower extremity 2/17/2020    Peptic ulcer 1/21/2020    Spinal stenosis     Congenital     Type 2 diabetes mellitus without complication, without long-term current use of insulin ([facility] [address] 8/15/2016     Past Surgical History:   Procedure Laterality Date    ANUS SURGERY  Feb 2013    Excision Rianna-anal condyloma, anoscopy    BACK SURGERY      epidural    CHOLECYSTECTOMY, LAPAROSCOPIC  2010    LYMPH NODE BIOPSY      benign    NERVE BLOCK Left 12/14/2020    LEFT L4 AND L5 TRANSFORAMINAL EPIDURAL STEROID INJECTION (CPT 77395) performed by Delio Estrada MD at [facility] 02/01/2021    TFESI    NERVE BLOCK Left 2/1/2021    LEFT L4 AND L5 TRANSFORAMINAL EPIDURAL STEROID INJECTION WITH SEDATION (CPT 89493) performed by Delio Estrada MD at [address] [place] [address]     Prior to Admission medications    Medication Sig Start Date End Date Taking? Authorizing Provider   Blood Glucose Monitoring Suppl (ACURA BLOOD GLUCOSE METER) w/Device KIT 1 Device by Does not apply route daily 12/8/20  Yes SUZIE Decker CNP   blood glucose monitor strips Test 2 times a day & as needed for symptoms of irregular blood glucose.  12/7/20  Yes SUZIE Decker CNP   metFORMIN (GLUCOPHAGE-XR) 500 MG extended release tablet Take 1 tablet by mouth 2 times daily (with meals) 11/9/20  Yes SUZIE Decker CNP   empagliflozin (JARDIANCE) 25 MG tablet Take 25 mg by mouth daily 11/9/20  Yes SUZIE Decker CNP   Dulaglutide 0.75 MG/0.5ML SOPN Inject 0.75 mg into the skin once a week 11/9/20  Yes SUZIE Cruz CNP   zoster recombinant adjuvanted vaccine [facility]) 50 MCG/0.5ML SUSR injection Inject 0.5 mLs into the muscle See Admin Instructions 1 dose now and repeat in 2-6 months 11/6/20 5/5/21 Yes SUZIE Cruz CNP   lisinopril (PRINIVIL;ZESTRIL) 10 MG tablet Take 1 tablet by mouth daily 2/17/20  Yes SUZIE Decker CNP   atenolol (TENORMIN) 25 MG tablet TAKE ONE TABLET BY MOUTH ONCE A DAY. 20  Yes SUZIE Reeder CNP   atorvastatin (LIPITOR) 40 MG tablet Take 1 tablet by mouth daily 20  Yes SUZIE Reeder CNP   fluticasone (FLONASE) 50 MCG/ACT nasal spray 1 spray by Nasal route daily 17  Yes SUZIE Reeder CNP   Multiple Vitamin (MULTI-VITAMIN) TABS Take 1 tablet by mouth Daily.    Yes Historical Provider, MD       Allergies   Allergen Reactions    Lopid [Gemfibrozil] Other (See Comments)     Enlarged prostate       Social History     Socioeconomic History    Marital status:      Spouse name: Not on file    Number of children: Not on file    Years of education: Not on file    Highest education level: Not on file   Occupational History    Not on file   Social Needs    Financial resource strain: Not on file    Food insecurity     Worry: Not on file     Inability: Not on file    Transportation needs     Medical: Not on file     Non-medical: Not on file   Tobacco Use    Smoking status: Former Smoker     Packs/day: 1.50     Years: 25.00     Pack years: 45.53     Quit date: 2002     Years since quittin.0    Smokeless tobacco: Never Used   Substance and Sexual Activity    Alcohol use: Yes     Comment: Social     Drug use: No    Sexual activity: Yes     Partners: Female   Lifestyle    Physical activity     Days per week: Not on file     Minutes per session: Not on file    Stress: Not on file   Relationships    Social connections     Talks on phone: Not on file     Gets together: Not on file     Attends Zoroastrianism service: Not on file     Active member of club or organization: Not on file     Attends meetings of clubs or organizations: Not on file     Relationship status: Not on file    Intimate partner violence     Fear of current or ex partner: Not on file     Emotionally abused: Not on file     Physically abused: Not on file     Forced sexual activity: Not on file   Other Topics Concern    Not on file   Social History Narrative    Dermatology:     Skin:no unusual rashes and no skin lesions    Impression:    Low back pain with radiation to the Left lower extremity along the Left L4 dermatome. Lumbar spine MRI 2020:L4-5 Left paracentral disc extrusion   Plan:  Follow up on his low back pain radiating down the Left lower extremity with complaints of increased pain radiating down the Left lower extremity. Patient is s/p Left L4 and L5 TFESI with excellent relief, will repeat as needed. Discontinue Gabapentin 300 mg. Continue with OTC pain medications. Jeronimo Zhong OARRS report reviewed 02/2021. Patient encouraged to stay active and to lose weight. Treatment plan discussed with the patient. We discussed with the patient that combining opioids, benzodiazepines, alcohol, illicit drugs or sleep aids increases the risk of respiratory depression including death. We discussed that these medications may cause drowsiness, sedation or dizziness and have counseled the patient not to drive or operate machinery. We have discussed that these medications will be prescribed only by one provider. We have discussed with the patient about age related risk factors and have thoroughly discussed the importance of taking these medications as prescribed. The patient verbalizes understanding. ccrefphaniing emory Drake M.D.

## 2021-02-16 NOTE — PROGRESS NOTES
Do you currently have any of the following:    Fever: No  Headache:  No  Cough: No  Shortness of breath: No  Exposed to anyone with these symptoms: No                                                                                                                Marley Mtz presents to the [facility] on [facility]/16/2021. Candice Estrada is complaining of pain lower back down left leg. The pain is intermittent. The pain is described as aching. Pain is rated on his best day at a 0, on his worst day at a 8, and on average at a 4 on the VAS scale. He took his last dose of Motrin and Tylenol prn  . Candice Estrada does not have issues with constipation. Any procedures since your last visit: Yes, with 85 % relief. Patient stopped Gabapentin due to sugar increase. He is  on NSAIDS and  is not on anticoagulation medications to include none and is managed by . Pacemaker or defibrillator: No Physician managing device is . Medication Contract and Consent for Opioid Use Documents Filed      No documents found                   /80   Pulse 54   Temp 96.9 °F (36.1 °C) (Infrared)   Resp 16   Ht 5' 6\" (1.676 m)   Wt 188 lb (85.3 kg)   SpO2 98%   BMI 30.34 kg/m²      No LMP for male patient.

## 2021-02-19 ENCOUNTER — OFFICE VISIT (OUTPATIENT)
Dept: FAMILY MEDICINE CLINIC | Age: 61
End: 2021-02-19
Payer: COMMERCIAL

## 2021-02-19 VITALS
SYSTOLIC BLOOD PRESSURE: 118 MMHG | OXYGEN SATURATION: 99 % | DIASTOLIC BLOOD PRESSURE: 70 MMHG | HEIGHT: 66 IN | WEIGHT: 195.6 LBS | HEART RATE: 68 BPM | BODY MASS INDEX: 31.43 KG/M2 | TEMPERATURE: 97.5 F | RESPIRATION RATE: 18 BRPM

## 2021-02-19 DIAGNOSIS — E78.2 MIXED HYPERLIPIDEMIA: ICD-10-CM

## 2021-02-19 DIAGNOSIS — E11.9 TYPE 2 DIABETES MELLITUS WITHOUT COMPLICATION, WITHOUT LONG-TERM CURRENT USE OF INSULIN (HCC): Primary | ICD-10-CM

## 2021-02-19 DIAGNOSIS — I10 ESSENTIAL HYPERTENSION: ICD-10-CM

## 2021-02-19 LAB — DIABETIC RETINOPATHY: NEGATIVE

## 2021-02-19 PROCEDURE — G8417 CALC BMI ABV UP PARAM F/U: HCPCS | Performed by: NURSE PRACTITIONER

## 2021-02-19 PROCEDURE — G8484 FLU IMMUNIZE NO ADMIN: HCPCS | Performed by: NURSE PRACTITIONER

## 2021-02-19 PROCEDURE — 1036F TOBACCO NON-USER: CPT | Performed by: NURSE PRACTITIONER

## 2021-02-19 PROCEDURE — G8427 DOCREV CUR MEDS BY ELIG CLIN: HCPCS | Performed by: NURSE PRACTITIONER

## 2021-02-19 PROCEDURE — 3017F COLORECTAL CA SCREEN DOC REV: CPT | Performed by: NURSE PRACTITIONER

## 2021-02-19 PROCEDURE — 99214 OFFICE O/P EST MOD 30 MIN: CPT | Performed by: NURSE PRACTITIONER

## 2021-02-19 PROCEDURE — 3051F HG A1C>EQUAL 7.0%<8.0%: CPT | Performed by: NURSE PRACTITIONER

## 2021-02-19 PROCEDURE — 2022F DILAT RTA XM EVC RTNOPTHY: CPT | Performed by: NURSE PRACTITIONER

## 2021-02-19 RX ORDER — LISINOPRIL 10 MG/1
10 TABLET ORAL DAILY
Qty: 90 TABLET | Refills: 3 | Status: SHIPPED
Start: 2021-02-19 | End: 2021-09-27 | Stop reason: SDUPTHER

## 2021-02-19 RX ORDER — METFORMIN HYDROCHLORIDE 500 MG/1
500 TABLET, EXTENDED RELEASE ORAL 2 TIMES DAILY WITH MEALS
Qty: 180 TABLET | Refills: 1 | Status: SHIPPED
Start: 2021-02-19 | End: 2021-05-24 | Stop reason: SDUPTHER

## 2021-02-19 RX ORDER — GLUCOSAMINE HCL/CHONDROITIN SU 500-400 MG
CAPSULE ORAL
Qty: 100 STRIP | Refills: 3 | Status: SHIPPED
Start: 2021-02-19 | End: 2021-02-22 | Stop reason: SDUPTHER

## 2021-02-19 RX ORDER — EMPAGLIFLOZIN 25 MG/1
25 TABLET, FILM COATED ORAL DAILY
Qty: 30 TABLET | Refills: 6 | Status: SHIPPED
Start: 2021-02-19 | End: 2021-09-08 | Stop reason: SDUPTHER

## 2021-02-19 RX ORDER — ATORVASTATIN CALCIUM 40 MG/1
40 TABLET, FILM COATED ORAL DAILY
Qty: 90 TABLET | Refills: 3 | Status: SHIPPED
Start: 2021-02-19 | End: 2021-09-27 | Stop reason: SDUPTHER

## 2021-02-19 ASSESSMENT — ENCOUNTER SYMPTOMS
TROUBLE SWALLOWING: 0
SORE THROAT: 0
ABDOMINAL PAIN: 0
BACK PAIN: 1
DIARRHEA: 0
NAUSEA: 0
VOMITING: 0
RHINORRHEA: 0
FACIAL SWELLING: 0
SINUS PRESSURE: 0
SINUS PAIN: 0
WHEEZING: 0
CHEST TIGHTNESS: 0
COUGH: 0
SHORTNESS OF BREATH: 0
VOICE CHANGE: 0
CONSTIPATION: 0
COLOR CHANGE: 0

## 2021-02-19 ASSESSMENT — PATIENT HEALTH QUESTIONNAIRE - PHQ9
SUM OF ALL RESPONSES TO PHQ QUESTIONS 1-9: 0
SUM OF ALL RESPONSES TO PHQ QUESTIONS 1-9: 0
SUM OF ALL RESPONSES TO PHQ9 QUESTIONS 1 & 2: 0

## 2021-02-19 NOTE — PROGRESS NOTES
OFFICE PROGRESS NOTE  [address]  [address] [place] [address]  Dept: [phone]   Chief Complaint   Patient presents with    Diabetes    Hypertension    Hyperlipidemia    Health Maintenance     pt brought eye exam, has not had second shingles         HPI:     Here today for DM, HTN, hyperlipidemia and review labs done for DM, HTN, hyperlipidemia  61year old white male. A1C on labs 2/16/2021 7.6%,   Patient reports being compliant to low carbohydrate diet and increasing weekly exercises.  His A1c has decreased from 8.4% to 7.6%.  Currently, the patient is treated with medication(s): jardiance 25 mg daily and metformin  mg BID.  Patient reports checking home blood sugar 1 - 2  times per day.  Patient states checking home blood sugar 110 - 160. Patient denies hypoglycemic episodes and understand to take sweetened beverages or a snack if hypoglycemic symptoms are suspected. Lab Results   Component Value Date     (H) 02/16/2021    K 5.1 (H) 02/16/2021     (H) 02/16/2021    CO2 19 (L) 02/16/2021    BUN 19 02/16/2021    CREATININE 1.5 (H) 02/16/2021    GLUCOSE 162 (H) 02/16/2021    CALCIUM 10.2 02/16/2021    PROT 7.7 02/16/2021    LABALBU 4.7 02/16/2021    BILITOT 0.4 02/16/2021    ALKPHOS 101 02/16/2021    AST 18 02/16/2021    ALT 29 02/16/2021    LABGLOM 48 02/16/2021    GFRAA 58 02/16/2021     He fasted for his labs and didn't drink any water before labs will recheck in 1 week. Hypertension: Patient here for follow-up of elevated blood pressure. He is exercising and is adherent to low salt diet. Blood pressure is well controlled at home. Cardiac symptoms none. Patient denies chest pain, chest pressure/discomfort, claudication, dyspnea, exertional chest pressure/discomfort, fatigue, irregular heart beat, lower extremity edema, near-syncope, orthopnea, palpitations, paroxysmal nocturnal dyspnea, syncope and tachypnea. Cardiovascular risk factors: advanced age (older than 54 for men, 72 for women), diabetes mellitus, dyslipidemia, hypertension, male gender and obesity (BMI >= 30 kg/m2). Use of agents associated with hypertension: none. History of target organ damage: none.       Hyperlipidemia: Patient presents with hyperlipidemia. He was tested because HTN, DM, hyperlipidemia. His last labs showed Total cholesterol of 153, HDL 49, LDL 70,  Triglycerides 163. Denies chest pain, dyspnea, exertional chest pressure/discomfort, fatigue, feeding intolerance, lower extremity edema, palpitations, poor exercise tolerance, syncope, tachypnea and skin xanthelasma. There is a family history of hyperlipidemia. There is not a family history of early ischemia heart disease.   Lab Results   Component Value Date    CHOL 152 02/16/2021    CHOL 163 02/15/2020    CHOL 134 02/10/2019     Lab Results   Component Value Date    TRIG 163 (H) 02/16/2021    TRIG 144 02/15/2020    TRIG 147 02/10/2019     Lab Results   Component Value Date    HDL 49 02/16/2021    HDL 49 02/15/2020    HDL 45 02/10/2019     Lab Results   Component Value Date    LDLCALC 70 02/16/2021    LDLCALC 85 02/15/2020    LDLCALC 60 02/10/2019     Lab Results   Component Value Date    LABVLDL 33 02/16/2021    LABVLDL 29 02/15/2020    LABVLDL 29 02/10/2019     Lab Results   Component Value Date    PSA 0.39 02/16/2021    PSA 0.30 02/15/2020    PSA 0.52 02/10/2019     Lab Results   Component Value Date    WBC 8.1 02/16/2021    HGB 15.9 02/16/2021 HCT 48.0 02/16/2021    MCV 92.3 02/16/2021     02/16/2021    LYMPHOPCT 21.2 02/16/2021    RBC 5.20 02/16/2021    MCH 30.6 02/16/2021    MCHC 33.1 02/16/2021    RDW 12.3 02/16/2021       He had injections into his back by DR Marta Ragland and is doing better he still has some tingling in his left leg      Current Outpatient Medications:     metFORMIN (GLUCOPHAGE-XR) 500 MG extended release tablet, Take 1 tablet by mouth 2 times daily (with meals), Disp: 180 tablet, Rfl: 1    lisinopril (PRINIVIL;ZESTRIL) 10 MG tablet, Take 1 tablet by mouth daily, Disp: 90 tablet, Rfl: 3    empagliflozin (JARDIANCE) 25 MG tablet, Take 25 mg by mouth daily, Disp: 30 tablet, Rfl: 6    blood glucose monitor strips, Test 2 times a day & as needed for symptoms of irregular blood glucose., Disp: 100 strip, Rfl: 3    atorvastatin (LIPITOR) 40 MG tablet, Take 1 tablet by mouth daily, Disp: 90 tablet, Rfl: 3    Dulaglutide 1.5 MG/0.5ML SOPN, Inject 1.5 mg into the skin once a week, Disp: 4 pen, Rfl: 6    Blood Glucose Monitoring Suppl (ACURA BLOOD GLUCOSE METER) w/Device KIT, 1 Device by Does not apply route daily, Disp: 1 kit, Rfl: 0    atenolol (TENORMIN) 25 MG tablet, TAKE ONE TABLET BY MOUTH ONCE A DAY., Disp: 90 tablet, Rfl: 3    fluticasone (FLONASE) 50 MCG/ACT nasal spray, 1 spray by Nasal route daily, Disp: 3 Bottle, Rfl: 3    Multiple Vitamin (MULTI-VITAMIN) TABS, Take 1 tablet by mouth Daily. , Disp: , Rfl:     zoster recombinant adjuvanted vaccine (SHINGRIX) 50 MCG/0.5ML SUSR injection, Inject 0.5 mLs into the muscle See Admin Instructions 1 dose now and repeat in 2-6 months, Disp: 0.5 mL, Rfl: 1  Social History     Socioeconomic History    Marital status:      Spouse name: None    Number of children: None    Years of education: None    Highest education level: None   Occupational History    None   Social Needs    Financial resource strain: None    Food insecurity     Worry: None     Inability: None  Transportation needs     Medical: None     Non-medical: None   Tobacco Use    Smoking status: Former Smoker     Packs/day: 1.50     Years: 25.00     Pack years: 37.50     Quit date: 2002     Years since quittin.0    Smokeless tobacco: Never Used   Substance and Sexual Activity    Alcohol use: Yes     Comment: Social     Drug use: No    Sexual activity: Yes     Partners: Female   Lifestyle    Physical activity     Days per week: None     Minutes per session: None    Stress: None   Relationships    Social connections     Talks on phone: None     Gets together: None     Attends Anabaptist service: None     Active member of club or organization: None     Attends meetings of clubs or organizations: None     Relationship status: None    Intimate partner violence     Fear of current or ex partner: None     Emotionally abused: None     Physically abused: None     Forced sexual activity: None   Other Topics Concern    None   Social History Narrative    None       I have reviewed Rohan's allergies, medications, problem list, medical, social and family history and have updated as needed in the electronic medical record    Review of Systems   Constitutional: Negative for activity change, appetite change, chills, diaphoresis, fatigue, fever and unexpected weight change. HENT: Negative for congestion, dental problem, drooling, ear discharge, ear pain, facial swelling, hearing loss, mouth sores, nosebleeds, postnasal drip, rhinorrhea, sinus pressure, sinus pain, sneezing, sore throat, tinnitus, trouble swallowing and voice change. Eyes: Negative for visual disturbance. Respiratory: Negative for cough, chest tightness, shortness of breath and wheezing. Cardiovascular: Negative for chest pain, palpitations and leg swelling. Gastrointestinal: Negative for abdominal pain, constipation, diarrhea, nausea and vomiting. Endocrine: Negative for cold intolerance, heat intolerance, polydipsia, polyphagia and polyuria. Genitourinary: Negative for difficulty urinating, frequency and urgency. Musculoskeletal: Positive for back pain. Negative for arthralgias, gait problem, joint swelling, myalgias, neck pain and neck stiffness. Skin: Negative for color change, pallor, rash and wound. Allergic/Immunologic: Negative for environmental allergies, food allergies and immunocompromised state. Neurological: Negative for dizziness, tremors, seizures, syncope, facial asymmetry, speech difficulty, weakness, light-headedness, numbness ( tingling) and headaches. Hematological: Negative for adenopathy. Does not bruise/bleed easily. Psychiatric/Behavioral: Negative for agitation, behavioral problems, confusion, decreased concentration, dysphoric mood, hallucinations, self-injury, sleep disturbance and suicidal ideas. The patient is not nervous/anxious and is not hyperactive.         OBJECTIVE:     VS:  Wt Readings from Last 3 Encounters:   02/19/21 195 lb 9.6 oz (88.7 kg)   02/16/21 188 lb (85.3 kg)   02/01/21 192 lb 9.6 oz (87.4 kg)                       Vitals:    02/19/21 0659   BP: 118/70   Pulse: 68   Resp: 18   Temp: 97.5 °F (36.4 °C)   SpO2: 99%   Weight: 195 lb 9.6 oz (88.7 kg)   Height: 5' 6\" (1.676 m)       General: Alert and oriented to person, place, and time, well developed and well nourished, in no acute distress  SKIN: Warm and dry, intact without any rash, masses or lesions  HEAD: normocephalic, atraumatic  Eyes: sclera/conjunctiva clear, PERRLA, EOMI's intact  ENT: tympanic membranes, external ear and ear canal normal bilaterally, normal hearing, Nose without deformity, nasal mucosa and turbinates normal without polyps   Throat: clear, tongue midline, tonsils 2+, drainage, no masses or lesions noted, good dentition Reminder for Podiatry visits Q 2 Months for toenail care if needed  Reminder to keep vaccines up dated  Exercise 30 minutes daily  Recommend Diabetic Education Classes if you have not already attended    Essential hypertension Stable  -     lisinopril (PRINIVIL;ZESTRIL) 10 MG tablet; Take 1 tablet by mouth daily  -Discussed taking medication and directed every day. -Discussed exercising daily 30 minutes 5 times a week for 150 minutes weekly.  -Discussed weight reduction if needed.  -Discussed low sodium diet.  -Discussed limiting caffeine consumption and tobacco cessation and the effects they have on the heart and blood pressure. Mixed hyperlipidemia Stable  -     atorvastatin (LIPITOR) 40 MG tablet; Take 1 tablet by mouth daily  -Discussed low fat diet, limit fast food, goodies, breads and pastas if consuming several days a week,  limit any alcohol consumption.  -Discussed weight reduction and exercise 30 minutes 5 days a week for total of 150 minutes weekly.  -Discussed if any unusual muscle aching/pain to contact the office, discussed medication and risk of muscle pain/damage from Rhabdomyolysis. Lab reviewed for CBC, CMP, PSA, Lipid, A1c    Discussed to hold off on second shingles vaccine until Covid vaccine is completed. Return in about 3 months (around 5/19/2021) for DM, HTN. Discussed weight loss, Discussed exercising 30 minutes daily and Discussed taking medications as directed and adverse effects        I have reviewed my findings and recommendations with Lizz Lira.     Rachel Laird, NP-C, FNP-BC

## 2021-02-19 NOTE — PATIENT INSTRUCTIONS
The medication list included in this document is our record of what you are currently taking, including any changes that were made at today's visit.  If you find any differences when compared to your medications at home, or have any questions that were not answered at your visit, please contact the office. Patient Education        High Cholesterol: Care Instructions  Your Care Instructions     Cholesterol is a type of fat in your blood. It is needed for many body functions, such as making new cells. Cholesterol is made by your body. It also comes from food you eat. High cholesterol means that you have too much of the fat in your blood. This raises your risk of a heart attack and stroke. LDL and HDL are part of your total cholesterol. LDL is the \"bad\" cholesterol. High LDL can raise your risk for heart disease, heart attack, and stroke. HDL is the \"good\" cholesterol. It helps clear bad cholesterol from the body. High HDL is linked with a lower risk of heart disease, heart attack, and stroke. Your cholesterol levels help your doctor find out your risk for having a heart attack or stroke. You and your doctor can talk about whether you need to lower your risk and what treatment is best for you. A heart-healthy lifestyle along with medicines can help lower your cholesterol and your risk. The way you choose to lower your risk will depend on how high your risk is for heart attack and stroke. It will also depend on how you feel about taking medicines. Follow-up care is a key part of your treatment and safety. Be sure to make and go to all appointments, and call your doctor if you are having problems. It's also a good idea to know your test results and keep a list of the medicines you take. How can you care for yourself at home? · Eat a variety of foods every day. Good choices include fruits, vegetables, whole grains (like oatmeal), dried beans and peas, nuts and seeds, soy products (like tofu), and fat-free or low-fat dairy products. · Replace butter, margarine, and hydrogenated or partially hydrogenated oils with olive and canola oils. (Canola oil margarine without trans fat is fine.)  · Replace red meat with fish, poultry, and soy protein (like tofu). · Limit processed and packaged foods like chips, crackers, and cookies. · Bake, broil, or steam foods. Don't palacio them. · Be physically active. Get at least 30 minutes of exercise on most days of the week. Walking is a good choice. You also may want to do other activities, such as running, swimming, cycling, or playing tennis or team sports. · Stay at a healthy weight or lose weight by making the changes in eating and physical activity listed above. Losing just a small amount of weight, even 5 to 10 pounds, can reduce your risk for having a heart attack or stroke. · Do not smoke. When should you call for help? Watch closely for changes in your health, and be sure to contact your doctor if:    · You need help making lifestyle changes.     · You have questions about your medicine. Where can you learn more? Go to https://Kudoalapepiceweb.[employer]. org and sign in to your [employer] account. Enter B675 in the eMoneyUnion box to learn more about \"High Cholesterol: Care Instructions. \"     If you do not have an account, please click on the \"Sign Up Now\" link. Current as of: December 16, 2019               Content Version: 12.6  © 5103-0731 [employer]. Care instructions adapted under license by [facility] ([facility]). If you have questions about a medical condition or this instruction, always ask your healthcare professional. [address] any warranty or liability for your use of this information.          Patient Education Learning About Tests When You Have Diabetes  Why do you need regular tests? Diabetes can lead to other health problems if it's not well controlled. You'll need tests to monitor how well your diabetes is controlled and to check for other things like high cholesterol or kidney problems. Having tests on a regular schedule can help your doctor find problems early, when it's easier to manage them. What tests do you need? These are the tests you may need and how often you should have them. A1c blood test.   This test shows the average level of blood sugar over the past 2 to 3 months. It helps your doctor see whether blood sugar levels have been staying within your target range. How often: Every 3 to 6 months  Goal: A blood sugar level in your target range  Blood pressure test.   This test measures the pressure of blood flow in the arteries. Controlling blood pressure can help prevent damage to nerves and blood vessels. How often: Every 3 to 6 months  Goal: A blood pressure level in your target range  Cholesterol test.   This test measures the amount of a type of fat in the blood. It is common for people with diabetes to also have high cholesterol. Too much cholesterol in the blood can build up inside the blood vessels and raise the risk for heart attack and stroke. How often: At the time of your diabetes diagnosis, and as often as your doctor recommends after that  Goal: A cholesterol level in your target range  Albumin-creatinine ratio test.   This test checks for kidney damage by looking for the protein albumin (say \"al-BYOO-elsie\") in the urine. Albumin is normally found in the blood. Kidney damage can let small amounts of it (microalbumin) leak into the urine. How often: Once a year  Goal: No protein in the urine  Blood creatinine test/estimated glomerular filtration (eGFR). The blood creatinine (say \"nohz-MC-yf-neen\") level shows how well your kidneys are working. Creatinine is a waste product that muscles release into the blood. Blood creatinine is used to estimate the glomerular filtration rate. A high level of creatinine and/or a low eGFR may mean your kidneys are not working as well as they should. How often: Once a year  Goal: Normal level of creatinine in the blood. The eGFR goal is greater than 60 mL/min/1.73 m². Complete foot exam.   The doctor checks for foot sores and whether any sensation has been lost.  How often: Once a year  Goal: Healthy feet with no foot ulcers or loss of feeling  Dental exam and cleaning. The dentist checks for gum disease and tooth decay. People with high blood sugar are more likely to have these problems. How often: Every 6 months  Goal: Healthy teeth and gums  Complete eye exam.   High blood sugar levels can damage the eyes. This exam is done by an ophthalmologist or optometrist. It includes a dilated eye exam. The exam shows whether there's damage to the back of the eye (diabetic retinopathy). How often: Once a year. If you don't have any signs of diabetic retinopathy, your doctor may recommend an exam every 2 years. Goal: No damage to the back of the eye  Thyroid-stimulating hormone (TSH) blood test.   This test checks for thyroid disease. Too little thyroid hormone can cause some medicines (like insulin) to stay in the body longer. This can cause low blood sugar. You may be tested if you have high cholesterol or are a woman over 48years old. How often: As part of your diabetes diagnosis, and as often as your doctor recommends after that  Goal: Normal level of TSH in the blood  Follow-up care is a key part of your treatment and safety. Be sure to make and go to all appointments, and call your doctor if you are having problems. It's also a good idea to know your test results and keep a list of the medicines you take. Where can you learn more? Go to https://chpepiceweb.healthWavesat. org and sign in to your [employer] account. Enter [id number] in the KylesCounterTack box to learn more about \"Learning About Tests When You Have Diabetes. \"     If you do not have an account, please click on the \"Sign Up Now\" link. Current as of: December 20, 2019               Content Version: 12.6  © 7143-7847 [employer]. Care instructions adapted under license by [employer]. If you have questions about a medical condition or this instruction, always ask your healthcare professional. [address] any warranty or liability for your use of this information.

## 2021-02-22 DIAGNOSIS — E11.9 TYPE 2 DIABETES MELLITUS WITHOUT COMPLICATION, WITHOUT LONG-TERM CURRENT USE OF INSULIN (HCC): Primary | ICD-10-CM

## 2021-02-22 RX ORDER — GLUCOSAMINE HCL/CHONDROITIN SU 500-400 MG
CAPSULE ORAL
Qty: 100 STRIP | Refills: 3 | Status: SHIPPED
Start: 2021-02-22 | End: 2021-09-27 | Stop reason: SDUPTHER

## 2021-03-04 DIAGNOSIS — E78.2 MIXED HYPERLIPIDEMIA: ICD-10-CM

## 2021-03-04 DIAGNOSIS — Z12.5 SCREENING PSA (PROSTATE SPECIFIC ANTIGEN): ICD-10-CM

## 2021-03-04 DIAGNOSIS — E11.9 TYPE 2 DIABETES MELLITUS WITHOUT COMPLICATION, WITHOUT LONG-TERM CURRENT USE OF INSULIN (HCC): ICD-10-CM

## 2021-03-04 DIAGNOSIS — I10 ESSENTIAL HYPERTENSION: ICD-10-CM

## 2021-03-04 LAB
ALBUMIN SERPL-MCNC: 4.4 G/DL (ref 3.5–5.2)
ALP BLD-CCNC: 95 U/L (ref 40–129)
ALT SERPL-CCNC: 26 U/L (ref 0–40)
ANION GAP SERPL CALCULATED.3IONS-SCNC: 12 MMOL/L (ref 7–16)
AST SERPL-CCNC: 18 U/L (ref 0–39)
BASOPHILS ABSOLUTE: 0.12 E9/L (ref 0–0.2)
BASOPHILS RELATIVE PERCENT: 1.7 % (ref 0–2)
BILIRUB SERPL-MCNC: 0.3 MG/DL (ref 0–1.2)
BUN BLDV-MCNC: 17 MG/DL (ref 8–23)
CALCIUM SERPL-MCNC: 9.2 MG/DL (ref 8.6–10.2)
CHLORIDE BLD-SCNC: 102 MMOL/L (ref 98–107)
CHOLESTEROL, TOTAL: 127 MG/DL (ref 0–199)
CO2: 24 MMOL/L (ref 22–29)
CREAT SERPL-MCNC: 1.3 MG/DL (ref 0.7–1.2)
EOSINOPHILS ABSOLUTE: 0.28 E9/L (ref 0.05–0.5)
EOSINOPHILS RELATIVE PERCENT: 4.1 % (ref 0–6)
GFR AFRICAN AMERICAN: >60
GFR NON-AFRICAN AMERICAN: 56 ML/MIN/1.73
GLUCOSE BLD-MCNC: 193 MG/DL (ref 74–99)
HBA1C MFR BLD: 7.4 % (ref 4–5.6)
HCT VFR BLD CALC: 47.6 % (ref 37–54)
HDLC SERPL-MCNC: 44 MG/DL
HEMOGLOBIN: 15.2 G/DL (ref 12.5–16.5)
IMMATURE GRANULOCYTES #: 0.04 E9/L
IMMATURE GRANULOCYTES %: 0.6 % (ref 0–5)
LDL CHOLESTEROL CALCULATED: 53 MG/DL (ref 0–99)
LYMPHOCYTES ABSOLUTE: 1.91 E9/L (ref 1.5–4)
LYMPHOCYTES RELATIVE PERCENT: 27.8 % (ref 20–42)
MCH RBC QN AUTO: 30.5 PG (ref 26–35)
MCHC RBC AUTO-ENTMCNC: 31.9 % (ref 32–34.5)
MCV RBC AUTO: 95.4 FL (ref 80–99.9)
MONOCYTES ABSOLUTE: 0.67 E9/L (ref 0.1–0.95)
MONOCYTES RELATIVE PERCENT: 9.8 % (ref 2–12)
NEUTROPHILS ABSOLUTE: 3.84 E9/L (ref 1.8–7.3)
NEUTROPHILS RELATIVE PERCENT: 56 % (ref 43–80)
PDW BLD-RTO: 12.8 FL (ref 11.5–15)
PLATELET # BLD: 214 E9/L (ref 130–450)
PMV BLD AUTO: 12.6 FL (ref 7–12)
POTASSIUM SERPL-SCNC: 4.2 MMOL/L (ref 3.5–5)
PROSTATE SPECIFIC ANTIGEN: 0.36 NG/ML (ref 0–4)
RBC # BLD: 4.99 E12/L (ref 3.8–5.8)
SODIUM BLD-SCNC: 138 MMOL/L (ref 132–146)
TOTAL PROTEIN: 7 G/DL (ref 6.4–8.3)
TRIGL SERPL-MCNC: 148 MG/DL (ref 0–149)
VLDLC SERPL CALC-MCNC: 30 MG/DL
WBC # BLD: 6.9 E9/L (ref 4.5–11.5)

## 2021-03-05 ENCOUNTER — TELEPHONE (OUTPATIENT)
Dept: FAMILY MEDICINE CLINIC | Age: 61
End: 2021-03-05

## 2021-03-26 DIAGNOSIS — I10 ESSENTIAL HYPERTENSION: ICD-10-CM

## 2021-03-29 RX ORDER — ATENOLOL 25 MG/1
TABLET ORAL
Qty: 90 TABLET | Refills: 0 | Status: SHIPPED
Start: 2021-03-29 | End: 2021-06-18 | Stop reason: SDUPTHER

## 2021-05-14 ENCOUNTER — OFFICE VISIT (OUTPATIENT)
Dept: PAIN MANAGEMENT | Age: 61
End: 2021-05-14
Payer: COMMERCIAL

## 2021-05-14 VITALS
BODY MASS INDEX: 30.05 KG/M2 | WEIGHT: 187 LBS | SYSTOLIC BLOOD PRESSURE: 140 MMHG | RESPIRATION RATE: 16 BRPM | DIASTOLIC BLOOD PRESSURE: 86 MMHG | HEIGHT: 66 IN | TEMPERATURE: 97.7 F | OXYGEN SATURATION: 96 % | HEART RATE: 75 BPM

## 2021-05-14 DIAGNOSIS — M48.061 STENOSIS OF LATERAL RECESS OF LUMBAR SPINE: ICD-10-CM

## 2021-05-14 DIAGNOSIS — G89.4 CHRONIC PAIN SYNDROME: ICD-10-CM

## 2021-05-14 DIAGNOSIS — M48.061 SPINAL STENOSIS OF LUMBAR REGION WITHOUT NEUROGENIC CLAUDICATION: Primary | ICD-10-CM

## 2021-05-14 DIAGNOSIS — M48.061 FORAMINAL STENOSIS OF LUMBAR REGION: ICD-10-CM

## 2021-05-14 DIAGNOSIS — M51.9 LUMBAR DISC DISORDER: ICD-10-CM

## 2021-05-14 DIAGNOSIS — M47.816 LUMBAR FACET ARTHROPATHY: ICD-10-CM

## 2021-05-14 DIAGNOSIS — M47.816 LUMBAR SPONDYLOSIS: ICD-10-CM

## 2021-05-14 DIAGNOSIS — M54.16 LUMBAR RADICULOPATHY: ICD-10-CM

## 2021-05-14 DIAGNOSIS — M79.18 MYOFASCIAL PAIN SYNDROME: ICD-10-CM

## 2021-05-14 PROCEDURE — 99214 OFFICE O/P EST MOD 30 MIN: CPT | Performed by: PAIN MEDICINE

## 2021-05-14 PROCEDURE — 3017F COLORECTAL CA SCREEN DOC REV: CPT | Performed by: PAIN MEDICINE

## 2021-05-14 PROCEDURE — 99213 OFFICE O/P EST LOW 20 MIN: CPT | Performed by: PAIN MEDICINE

## 2021-05-14 PROCEDURE — G8427 DOCREV CUR MEDS BY ELIG CLIN: HCPCS | Performed by: PAIN MEDICINE

## 2021-05-14 PROCEDURE — 1036F TOBACCO NON-USER: CPT | Performed by: PAIN MEDICINE

## 2021-05-14 PROCEDURE — G8417 CALC BMI ABV UP PARAM F/U: HCPCS | Performed by: PAIN MEDICINE

## 2021-05-14 NOTE — PROGRESS NOTES
[facility], [address] [place] [address] Chris  [phone]    Follow up Note      Mary Kate Esqueda     Date of Visit:  5/14/2021    CC:  Patient presents for follow up   Chief Complaint   Patient presents with    Follow-up    Shoulder Pain     right shoulder, between shoulder blades     HPI:    Follow up on his low back pain radiating down the Left lower extremity, patient continues to feel better since his TFESI  Complaints of increased right shoulder blade pain. Appropriate analgesia with current medications regimen:N/A. Change in quality of symptoms:no. Medication side effects:not applicable . Recent diagnostic testing:none. Recent interventional procedures:none     He has not been on anticoagulation medications to include none. The patient  has not been on herbal supplements.  The patient is diabetic.     Imaging:   Lumbar spine MRI 2020:  Degenerative disc disease as described above.         Spinal canal narrowing, moderate at L4-5.         Narrowing of the bilateral lateral recesses at L4-5, contacting left exiting    L4 and left descending L5 nerve roots, and contacting the right descending L5    nerve root.         Foraminal narrowing, severe at left L5-S1, moderate to severe at right L5-S1,    moderate at bilateral L4-5, mild at left L3-4.       Previous treatments: Physical Therapy and medications. .                                         Potential Aberrant Drug-Related Behavior:    No     Urine Drug Screening:  none     OARRS report:  05/2021 consistent    Opioid Agreement:  Date enacted:N/A  Renewal date:N/A    Past Medical History:   Diagnosis Date    Cervical radiculopathy     Bilateral upper extemities    Degeneration, intervertebral disc, cervical     C4-C5 through C6-C7 levels    Diabetes mellitus ([facility] [address]     Headache(784.0)     Hyperlipidemia     Hypertension     Low back pain radiating to left lower extremity 2/17/2020    Peptic ulcer 1/21/2020    Spinal stenosis     Congenital     Type 2 diabetes mellitus without complication, without long-term current use of insulin ([facility] [address] 8/15/2016     Past Surgical History:   Procedure Laterality Date    ANUS SURGERY  Feb 2013    Excision Rianna-anal condyloma, anoscopy    BACK SURGERY      epidural    CHOLECYSTECTOMY, LAPAROSCOPIC  2010    LYMPH NODE BIOPSY      benign    NERVE BLOCK Left 12/14/2020    LEFT L4 AND L5 TRANSFORAMINAL EPIDURAL STEROID INJECTION (CPT 63045) performed by Lazarus Joseph, MD at [facility] 02/01/2021    TFESI    NERVE BLOCK Left 2/1/2021    LEFT L4 AND L5 TRANSFORAMINAL EPIDURAL STEROID INJECTION WITH SEDATION (CPT 10665) performed by Lazarus Joseph, MD at [address] [place] [address]     Prior to Admission medications    Medication Sig Start Date End Date Taking? Authorizing Provider   atenolol (TENORMIN) 25 MG tablet TAKE ONE TABLET BY MOUTH ONCE A DAY. 3/29/21  Yes SUZIE Disla CNP   blood glucose monitor strips Test 1 times a day & as needed for symptoms of irregular blood glucose.  2/22/21  Yes SUZIE Disla CNP   metFORMIN (GLUCOPHAGE-XR) 500 MG extended release tablet Take 1 tablet by mouth 2 times daily (with meals) 2/19/21  Yes SUZIE Disla CNP   lisinopril (PRINIVIL;ZESTRIL) 10 MG tablet Take 1 tablet by mouth daily 2/19/21  Yes SUZIE Disla CNP   empagliflozin (JARDIANCE) 25 MG tablet Take 25 mg by mouth daily 2/19/21  Yes SUZIE Disla CNP   atorvastatin (LIPITOR) 40 MG tablet Take 1 tablet by mouth daily 2/19/21  Yes SUZIE Disla CNP   Dulaglutide 1.5 MG/0.5ML SOPN Inject 1.5 mg into the skin once a week 2/19/21  Yes SUZIE Daniels CNP   Blood Glucose Monitoring Suppl [facility] BLOOD GLUCOSE METER) w/Device KIT 1 Device by Does not apply route daily 12/8/20  Yes SUZIE Disla CNP   fluticasone (FLONASE) 50 MCG/ACT nasal spray 1 spray by Nasal route daily 6/16/17  Yes SUZIE Disla CNP Multiple Vitamin (MULTI-VITAMIN) TABS Take 1 tablet by mouth Daily.    Yes Historical Provider, MD       Allergies   Allergen Reactions    Lopid [Gemfibrozil] Other (See Comments)     Enlarged prostate       Social History     Socioeconomic History    Marital status:      Spouse name: Not on file    Number of children: Not on file    Years of education: Not on file    Highest education level: Not on file   Occupational History    Not on file   Social Needs    Financial resource strain: Not on file    Food insecurity     Worry: Not on file     Inability: Not on file    Transportation needs     Medical: Not on file     Non-medical: Not on file   Tobacco Use    Smoking status: Former Smoker     Packs/day: 1.50     Years: 25.00     Pack years: 45.53     Quit date: 2002     Years since quittin.2    Smokeless tobacco: Never Used   Substance and Sexual Activity    Alcohol use: Yes     Comment: Social     Drug use: No    Sexual activity: Yes     Partners: Female   Lifestyle    Physical activity     Days per week: Not on file     Minutes per session: Not on file    Stress: Not on file   Relationships    Social connections     Talks on phone: Not on file     Gets together: Not on file     Attends Hoahaoism service: Not on file     Active member of club or organization: Not on file     Attends meetings of clubs or organizations: Not on file     Relationship status: Not on file    Intimate partner violence     Fear of current or ex partner: Not on file     Emotionally abused: Not on file     Physically abused: Not on file     Forced sexual activity: Not on file   Other Topics Concern    Not on file   Social History Narrative    Not on file     Family History   Problem Relation Age of Onset    Hypertension Mother     Hypertension Father     Cancer Father         Prostate    Heart Surgery Father     Heart Disease Father     High Cholesterol Sister     High Cholesterol Brother     Heart Disease Brother      REVIEW OF SYSTEMS:     [place] Piyush denies fever/chills, chest pain, shortness of breath, new bowel or bladder complaints. All other review of systems was negative. PHYSICAL EXAMINATION:      BP (!) 140/86   Pulse 75   Temp 97.7 °F (36.5 °C) (Temporal)   Resp 16   Ht 5' 6\" (1.676 m)   Wt 187 lb (84.8 kg)   SpO2 96%   BMI 30.18 kg/m²     General:       General appearance:   pleasant and well-hydrated. , in no discomfort and A & O x3  Build:Normal Weight     HEENT:     Head:normocephalic and atraumatic  Sclera: icterus absent,      Lungs:     Breathing:Breathing Pattern: regular, no distress     Abdomen:     Shape:non-distended and normal  Tenderness:none    Cervical spine:    Inspection:normal  Palpation:trigger points trapezium right and right infraspinatus  Range of motion:normal not flexion, extension rotation bilateral and is not painful     Lumbar spine:     Spine inspection:normal   CVA tenderness:No   Palpation:tenderness paravertebral muscles, facet loading, left, right, positive and tenderness. Range of motion:abnormal moderately Lateral bending, flexion, extension rotation bilateral and is  painful.     Musculoskeletal:     Trigger points in Paraveteral:present right trapezius and infraspinatus. SI joint tenderness:negative right, negative left  SLR:negative right, negative left, sitting      Extremities:     Tremors:None bilaterally upper and lower  Range of motion:pain with internal rotation of hips negative. Normal ROM of right shoulder. Intact:Yes  Edema:Normal     Neurological:     Sensory:normal to light touch bilateral lower extremities.   Motor:                             Right Quadriceps5/5          Left Quadriceps5/5           Right Gastrocnemius5/5    Left Gastrocnemius5/5  Right Ant Tibialis5/5  Left Ant Tibialis5/5  Reflexes:    Right Quadriceps reflex2+  Left Quadriceps reflex1+  Right Achilles reflex2+  Left Achilles reflex2+  Gait:normal     Dermatology:     Skin:no unusual rashes and no skin lesions    Impression:    Low back pain with radiation to the Left lower extremity along the Left L4 dermatome. Lumbar spine MRI 2020:L4-5 Left paracentral disc extrusion   Plan:  Follow up on his low back pain radiating down the Left lower extremity which continues to feel better since his TFESI. Increased right shoulder pain with TPI in left trapezius and infraspinatus muscle. Will start patient on Zanaflex 2 mg QHS for muscle spasms, discussed side effects  Continue with OTC pain medications. Yas Salas OARRS report reviewed 05/2021. Patient encouraged to stay active and to lose weight. Treatment plan discussed with the patient. We discussed with the patient that combining opioids, benzodiazepines, alcohol, illicit drugs or sleep aids increases the risk of respiratory depression including death. We discussed that these medications may cause drowsiness, sedation or dizziness and have counseled the patient not to drive or operate machinery. We have discussed that these medications will be prescribed only by one provider. We have discussed with the patient about age related risk factors and have thoroughly discussed the importance of taking these medications as prescribed. The patient verbalizes understanding. ccrefrogers Flynn M.D.

## 2021-05-14 NOTE — PROGRESS NOTES
Do you currently have any of the following:    Fever: No  Headache:  No  Cough: No  Shortness of breath: No  Exposed to anyone with these symptoms: No                                                                                                                Chantel Mixer presents to the [address] [address] on 5/14/2021. Jerome Auguste is complaining of pain right shoulder and between shoulder blades. The pain is constant. The pain is described as aching, shooting and tender and goes down right arm. Pain is rated on his best day at a 3, on his worst day at a 8, and on average at a 6 on the VAS scale. He took his last dose of Motrin tuesday. Jerome Auguste does not have issues with constipation. Any procedures since your last visit: No  He is not on NSAIDS and  is not on anticoagulation medications to include none. Pacemaker or defibrillator: No.    Medication Contract and Consent for Opioid Use Documents Filed      No documents found                   BP (!) 140/86   Pulse 75   Temp 97.7 °F (36.5 °C) (Temporal)   Resp 16   Ht 5' 6\" (1.676 m)   Wt 187 lb (84.8 kg)   SpO2 96%   BMI 30.18 kg/m²      No LMP for male patient.

## 2021-05-15 RX ORDER — TIZANIDINE 2 MG/1
2 TABLET ORAL NIGHTLY PRN
Qty: 30 TABLET | Refills: 0 | Status: SHIPPED | OUTPATIENT
Start: 2021-05-15 | End: 2021-06-14

## 2021-05-24 ENCOUNTER — OFFICE VISIT (OUTPATIENT)
Dept: FAMILY MEDICINE CLINIC | Age: 61
End: 2021-05-24
Payer: COMMERCIAL

## 2021-05-24 VITALS
SYSTOLIC BLOOD PRESSURE: 124 MMHG | WEIGHT: 192.5 LBS | HEIGHT: 66 IN | DIASTOLIC BLOOD PRESSURE: 76 MMHG | RESPIRATION RATE: 16 BRPM | BODY MASS INDEX: 30.94 KG/M2 | HEART RATE: 69 BPM | TEMPERATURE: 97.2 F | OXYGEN SATURATION: 96 %

## 2021-05-24 DIAGNOSIS — E11.9 TYPE 2 DIABETES MELLITUS WITHOUT COMPLICATION, WITHOUT LONG-TERM CURRENT USE OF INSULIN (HCC): Primary | ICD-10-CM

## 2021-05-24 DIAGNOSIS — M54.31 RIGHT SCIATIC NERVE PAIN: ICD-10-CM

## 2021-05-24 DIAGNOSIS — M99.09 SOMATIC DYSFUNCTION OF BACK: ICD-10-CM

## 2021-05-24 DIAGNOSIS — I10 ESSENTIAL HYPERTENSION: ICD-10-CM

## 2021-05-24 LAB
CHP ED QC CHECK: NORMAL
GLUCOSE BLD-MCNC: 168 MG/DL
HBA1C MFR BLD: 6.7 %

## 2021-05-24 PROCEDURE — 83036 HEMOGLOBIN GLYCOSYLATED A1C: CPT | Performed by: NURSE PRACTITIONER

## 2021-05-24 PROCEDURE — G8427 DOCREV CUR MEDS BY ELIG CLIN: HCPCS | Performed by: NURSE PRACTITIONER

## 2021-05-24 PROCEDURE — 1036F TOBACCO NON-USER: CPT | Performed by: NURSE PRACTITIONER

## 2021-05-24 PROCEDURE — G8417 CALC BMI ABV UP PARAM F/U: HCPCS | Performed by: NURSE PRACTITIONER

## 2021-05-24 PROCEDURE — 99214 OFFICE O/P EST MOD 30 MIN: CPT | Performed by: NURSE PRACTITIONER

## 2021-05-24 PROCEDURE — 3044F HG A1C LEVEL LT 7.0%: CPT | Performed by: NURSE PRACTITIONER

## 2021-05-24 PROCEDURE — 82962 GLUCOSE BLOOD TEST: CPT | Performed by: NURSE PRACTITIONER

## 2021-05-24 PROCEDURE — 3017F COLORECTAL CA SCREEN DOC REV: CPT | Performed by: NURSE PRACTITIONER

## 2021-05-24 PROCEDURE — 2022F DILAT RTA XM EVC RTNOPTHY: CPT | Performed by: NURSE PRACTITIONER

## 2021-05-24 RX ORDER — METFORMIN HYDROCHLORIDE 500 MG/1
500 TABLET, EXTENDED RELEASE ORAL 2 TIMES DAILY WITH MEALS
Qty: 180 TABLET | Refills: 1 | Status: SHIPPED
Start: 2021-05-24 | End: 2021-09-27 | Stop reason: SDUPTHER

## 2021-05-24 SDOH — ECONOMIC STABILITY: HOUSING INSECURITY
IN THE LAST 12 MONTHS, WAS THERE A TIME WHEN YOU DID NOT HAVE A STEADY PLACE TO SLEEP OR SLEPT IN A SHELTER (INCLUDING NOW)?: NO

## 2021-05-24 SDOH — ECONOMIC STABILITY: INCOME INSECURITY: IN THE LAST 12 MONTHS, WAS THERE A TIME WHEN YOU WERE NOT ABLE TO PAY THE MORTGAGE OR RENT ON TIME?: NO

## 2021-05-24 SDOH — ECONOMIC STABILITY: TRANSPORTATION INSECURITY
IN THE PAST 12 MONTHS, HAS LACK OF TRANSPORTATION KEPT YOU FROM MEETINGS, WORK, OR FROM GETTING THINGS NEEDED FOR DAILY LIVING?: NO

## 2021-05-24 ASSESSMENT — ENCOUNTER SYMPTOMS
RHINORRHEA: 0
COUGH: 0
BACK PAIN: 0
SINUS PAIN: 0
SINUS PRESSURE: 0
CHEST TIGHTNESS: 0
ABDOMINAL PAIN: 0
WHEEZING: 0
NAUSEA: 0
DIARRHEA: 0
TROUBLE SWALLOWING: 0
VOMITING: 0
CONSTIPATION: 0
FACIAL SWELLING: 0
COLOR CHANGE: 0
SHORTNESS OF BREATH: 0
SORE THROAT: 0
VOICE CHANGE: 0

## 2021-05-24 ASSESSMENT — SOCIAL DETERMINANTS OF HEALTH (SDOH): HOW HARD IS IT FOR YOU TO PAY FOR THE VERY BASICS LIKE FOOD, HOUSING, MEDICAL CARE, AND HEATING?: NOT HARD AT ALL

## 2021-05-24 NOTE — ASSESSMENT & PLAN NOTE
Borderline controlled, continue current medications, lifestyle modifications recommended and recommend seeing chiropractor for gentle manipulation

## 2021-05-24 NOTE — PROGRESS NOTES
OFFICE PROGRESS NOTE  [address] [place] [address] Rd  [phone] [id number]  Dept: [phone]   Chief Complaint   Patient presents with    Diabetes    Hypertension       ASSESSMENT/PLAN   1. Type 2 diabetes mellitus without complication, without long-term current use of insulin (Formerly [facility])  Assessment & Plan:   Well-controlled, continue current medications, continue current treatment plan, medication adherence emphasized and lifestyle modifications recommended   Monitor BS at different times: 1 day fasting, then next day 2 hours after lunch, next day 2 hours after dinner, next day at bedtime then start over and log all values. Bring log to next appointment  Foot exam every day; wash and dry well between toes, look for any redness, cracks, wounds notify provider if any problems occur  Reminder for annual Eye exam  Reminder for Podiatry visits Q 2 Months for toenail care if needed  Reminder to keep vaccines up dated  Exercise 30 minutes daily  Recommend Diabetic Education Classes if you have not already attended  Orders:  -     POCT glycosylated hemoglobin (Hb A1C)  -     POCT Glucose  -     metFORMIN (GLUCOPHAGE-XR) 500 MG extended release tablet; Take 1 tablet by mouth 2 times daily (with meals), Disp-180 tablet, R-1Plain metformin causes diarrhea, note increase in doseNormal  2. Essential hypertension  Assessment & Plan:   Well-controlled, continue current medications, continue current treatment plan, medication adherence emphasized and lifestyle modifications recommended   -Discussed taking medication and directed every day. -Discussed exercising daily 30 minutes 5 times a week for 150 minutes weekly.  -Discussed weight reduction if needed.  -Discussed low sodium diet.  -Discussed limiting caffeine consumption and tobacco cessation and the effects they have on the heart and blood pressure.    3. Right sciatic nerve pain  Assessment & Plan:   Borderline controlled, continue current medications, lifestyle modifications recommended and recommend seeing chiropractor for gentle manipulation  Orders:  -     Amb External Referral To Chiropractic  4. Somatic dysfunction of back  Assessment & Plan:   Borderline controlled, continue current medications and recommend seeing chiropractor for gentle manipulation  Orders:  -     Amb External Referral To Chiropractic       Reviewed labs: CMP, CBCD, Lipids, A1c, PSA 3/4/2021      Discussed weight loss, Discussed exercising 30 minutes daily and Discussed taking medications as directed and adverse effects    Return in about 3 months (around 8/24/2021) for DM, HTN, hyperlipidemia. HPI:     64 y.o. male presents today for follow-up of diabetes [place]. In-office bloodsugar is:   Results for POC orders placed in visit on 05/24/21   POCT glycosylated hemoglobin (Hb A1C)   Result Value Ref Range    Hemoglobin A1C 6.7 %   POCT Glucose   Result Value Ref Range    Glucose 168 mg/dL    QC OK? Patient reports being compliant to low carbohydrate diet and increasing weekly exercises.  Currently, the patient is treated with medication(s): jardiance 25 mg daily, Trulicity 1.5 mg once a week and metformin  mg BID. Patient reports checking home blood sugar 1 times per day. Patient states home blood sugar ranges from 122 - 150. Patient denies hypoglycemic episodes and understand to take sweetened beverages or a snack if hypoglycemic symptoms are suspected. Hypertension: Patient here for follow-up of elevated blood pressure. He is exercising and is adherent to low salt diet.  Blood pressure is well controlled at home. Cardiac symptoms none.  Patient denies chest pain, chest pressure/discomfort, claudication, dyspnea, exertional chest pressure/discomfort, fatigue, irregular heart beat, lower extremity edema, near-syncope, orthopnea, palpitations, paroxysmal nocturnal dyspnea, syncope and tachypnea.  Cardiovascular risk factors: advanced age (older than 54 for men, 72 for women), diabetes mellitus, dyslipidemia, hypertension, male gender and obesity (BMI >= 30 kg/m2). Use of agents associated with hypertension: none. History of target organ damage: none.     He is complaining of pain in the right sciatic now about a month and 1/2 ago. He complains of pain with walking and at times will not be able to put weight on it. He has fallen to his knees because of it. He denies any injury. His back and left hip are doing well since he had the injections. He is on Zanaflex 2 mg every other night for spasms. He did take 2 aleve and the pain in the right sciatic is gone. He did talk with DR Santa Zapata and was told he could put TPI if persists.      Today's vital signs are as follows:  /76   Pulse 69   Temp 97.2 °F (36.2 °C)   Resp 16   Ht 5' 6\" (1.676 m)   Wt 192 lb 8 oz (87.3 kg)   SpO2 96%   BMI 31.07 kg/m²     Lab Results   Component Value Date    LABA1C 6.7 05/24/2021     Pain scale: 2/10 in right sciatic        Current Outpatient Medications:     metFORMIN (GLUCOPHAGE-XR) 500 MG extended release tablet, Take 1 tablet by mouth 2 times daily (with meals), Disp: 180 tablet, Rfl: 1    tiZANidine (ZANAFLEX) 2 MG tablet, Take 1 tablet by mouth nightly as needed (muscle spasms), Disp: 30 tablet, Rfl: 0    atenolol (TENORMIN) 25 MG tablet, TAKE ONE TABLET BY MOUTH ONCE A DAY., Disp: 90 tablet, Rfl: 0    blood glucose monitor strips, Test 1 times a day & as needed for symptoms of irregular blood glucose., Disp: 100 strip, Rfl: 3    lisinopril (PRINIVIL;ZESTRIL) 10 MG tablet, Take 1 tablet by mouth daily, Disp: 90 tablet, Rfl: 3    empagliflozin (JARDIANCE) 25 MG tablet, Take 25 mg by mouth daily, Disp: 30 tablet, Rfl: 6    atorvastatin (LIPITOR) 40 MG tablet, Take 1 tablet by mouth daily, Disp: 90 tablet, Rfl: 3    Dulaglutide 1.5 MG/0.5ML SOPN, Inject 1.5 mg into the skin once a week, Disp: 4 pen, Rfl: 6    Blood Glucose Monitoring Suppl Rohan's allergies, medications, problem list, medical, social and family history and have updated as needed in the electronic medical record    Review of Systems   Constitutional: Negative for activity change, appetite change, chills, diaphoresis, fatigue, fever and unexpected weight change. HENT: Negative for congestion, dental problem, drooling, ear discharge, ear pain, facial swelling, hearing loss, mouth sores, nosebleeds, postnasal drip, rhinorrhea, sinus pressure, sinus pain, sneezing, sore throat, tinnitus, trouble swallowing and voice change. Eyes: Negative for visual disturbance. Respiratory: Negative for cough ( on occasion), chest tightness, shortness of breath and wheezing. Cardiovascular: Negative for chest pain, palpitations and leg swelling. Gastrointestinal: Negative for abdominal pain, constipation, diarrhea, nausea and vomiting. Endocrine: Negative for cold intolerance, heat intolerance, polydipsia, polyphagia and polyuria. Genitourinary: Negative for difficulty urinating, frequency and urgency. Musculoskeletal: Positive for arthralgias and gait problem ( right sciatic). Negative for back pain, joint swelling, myalgias, neck pain and neck stiffness. Skin: Negative for color change, pallor, rash and wound. Allergic/Immunologic: Negative for environmental allergies, food allergies and immunocompromised state. Neurological: Negative for dizziness, tremors, seizures, syncope, facial asymmetry, speech difficulty, weakness, light-headedness, numbness and headaches. Hematological: Negative for adenopathy. Does not bruise/bleed easily. Psychiatric/Behavioral: Negative for agitation, behavioral problems, confusion, decreased concentration, dysphoric mood, hallucinations, self-injury, sleep disturbance and suicidal ideas. The patient is not nervous/anxious and is not hyperactive.         OBJECTIVE:     VS:  Wt Readings from Last 3 Encounters:   05/24/21 192 lb 8 oz (87.3 kg) 05/14/21 187 lb (84.8 kg)   02/19/21 195 lb 9.6 oz (88.7 kg)                       Vitals:    05/24/21 0652   BP: 124/76   Pulse: 69   Resp: 16   Temp: 97.2 °F (36.2 °C)   SpO2: 96%   Weight: 192 lb 8 oz (87.3 kg)   Height: 5' 6\" (1.676 m)       General: Alert and oriented to person, place, and time, well developed and well nourished, in no acute distress  SKIN: Warm and dry, intact without any rash, masses or lesions  HEAD: normocephalic, atraumatic  Eyes: sclera/conjunctiva clear, PERRLA, EOMI's intact  ENT: tympanic membranes, external ear and ear canal normal bilaterally, normal hearing, Nose without deformity, nasal mucosa and turbinates normal without polyps   Throat: clear, tongue midline, tonsils 2+, drainage, no masses or lesions noted, good dentition  Neck: supple and non-tender without mass, trachea midline, no cervical lymphadenopathy, no bruit, no thyromegaly or nodules  Cardiovascular: regular rate and regular rhythm, normal S1 and S2,  no murmurs, rubs, clicks, or gallop. Distal pulses intact, no carotid bruits. No edema  Pulmonary/Chest: clear to auscultation bilaterally, no wheezes, rales or rhonchi, normal air movement, no respiratory distress  Abdomen: soft, non-tender, non-distended, normal bowel sounds, no masses or hepatosplenomegaly  Musculoskeletal: Normal ROM, no joint swelling, deformity or tenderness in right sciatic, however the right shoulder is lower than the left, hips/pelvis and leg length discrepancy noted. Neurologic: reflexes normal and symmetric, no cranial nerve deficit, gait, coordination and speech normal  Extremities: no clubbing, cyanosis, or edema. Psychiatric: Good eye contact, normal mood and affect, answers questions appropriately    I have reviewed my findings and recommendations with Janeen Adorno.     Summer Meneses, APRN - CNP, NP-C, FNP-BC

## 2021-05-24 NOTE — ASSESSMENT & PLAN NOTE
Well-controlled, continue current medications, continue current treatment plan, medication adherence emphasized and lifestyle modifications recommended   Monitor BS at different times: 1 day fasting, then next day 2 hours after lunch, next day 2 hours after dinner, next day at bedtime then start over and log all values.   Bring log to next appointment  Foot exam every day; wash and dry well between toes, look for any redness, cracks, wounds notify provider if any problems occur  Reminder for annual Eye exam  Reminder for Podiatry visits Q 2 Months for toenail care if needed  Reminder to keep vaccines up dated  Exercise 30 minutes daily  Recommend Diabetic Education Classes if you have not already attended

## 2021-05-24 NOTE — ASSESSMENT & PLAN NOTE
Well-controlled, continue current medications, continue current treatment plan, medication adherence emphasized and lifestyle modifications recommended   -Discussed taking medication and directed every day. -Discussed exercising daily 30 minutes 5 times a week for 150 minutes weekly.  -Discussed weight reduction if needed.  -Discussed low sodium diet.  -Discussed limiting caffeine consumption and tobacco cessation and the effects they have on the heart and blood pressure.

## 2021-06-08 ENCOUNTER — HOSPITAL ENCOUNTER (EMERGENCY)
Age: 61
Discharge: HOME | End: 2021-06-08
Payer: COMMERCIAL

## 2021-06-08 VITALS
OXYGEN SATURATION: 97 % | WEIGHT: 184 LBS | HEIGHT: 66 IN | SYSTOLIC BLOOD PRESSURE: 123 MMHG | RESPIRATION RATE: 20 BRPM | HEART RATE: 64 BPM | TEMPERATURE: 98.2 F | DIASTOLIC BLOOD PRESSURE: 84 MMHG | BODY MASS INDEX: 29.57 KG/M2

## 2021-06-08 DIAGNOSIS — L25.5 RHUS DERMATITIS: Primary | ICD-10-CM

## 2021-06-08 PROCEDURE — 6360000002 HC RX W HCPCS: Performed by: PHYSICIAN ASSISTANT

## 2021-06-08 PROCEDURE — 99211 OFF/OP EST MAY X REQ PHY/QHP: CPT

## 2021-06-08 PROCEDURE — 96372 THER/PROPH/DIAG INJ SC/IM: CPT

## 2021-06-08 RX ORDER — DEXAMETHASONE SODIUM PHOSPHATE 10 MG/ML
10 INJECTION, SOLUTION INTRAMUSCULAR; INTRAVENOUS ONCE
Status: COMPLETED | OUTPATIENT
Start: 2021-06-08 | End: 2021-06-08

## 2021-06-08 RX ADMIN — DEXAMETHASONE SODIUM PHOSPHATE 10 MG: 10 INJECTION, SOLUTION INTRAMUSCULAR; INTRAVENOUS at 18:58

## 2021-06-08 NOTE — ED PROVIDER NOTES
[address] [facility]  Department of Emergency Medicine  UC Encounter Note  21   6:46 PM EDT      NAME: Mary Kate Esqueda  :  1960  MRN:  [id number]    Chief Complaint: Rash (started  10 days ago with rash   on arms    was working outside)      This is a 72-year-old male the presents to urgent care complaining of a poison ivy type rash to his both of his arms for the past week and a half. States he was working in his yard and most of touched something. Denies any difficulty breathing or swallowing. First contact patient he appears to be in no acute distress. Review of Systems  Pertinent positives and negatives are stated within HPI, all other systems reviewed and are negative. Physical Exam  Vitals and nursing note reviewed. Constitutional:       Appearance: He is well-developed. HENT:      Head: Normocephalic and atraumatic. Jaw: No trismus. Right Ear: Hearing, tympanic membrane, ear canal and external ear normal.      Left Ear: Hearing, tympanic membrane, ear canal and external ear normal.      Nose: Nose normal.      Right Sinus: No maxillary sinus tenderness or frontal sinus tenderness. Left Sinus: No maxillary sinus tenderness or frontal sinus tenderness. Mouth/Throat:      Pharynx: Uvula midline. No uvula swelling. Eyes:      General: Lids are normal.      Conjunctiva/sclera: Conjunctivae normal.      Pupils: Pupils are equal, round, and reactive to light. Cardiovascular:      Rate and Rhythm: Normal rate and regular rhythm. Heart sounds: Normal heart sounds. No murmur heard. Pulmonary:      Effort: Pulmonary effort is normal.      Breath sounds: Normal breath sounds. Abdominal:      General: Bowel sounds are normal.      Palpations: Abdomen is soft. Abdomen is not rigid. Tenderness: There is no abdominal tenderness. There is no guarding or rebound.    Musculoskeletal:      Cervical back: Normal range of motion and neck supple. Skin:     General: Skin is warm and dry. Findings: Rash present. No abrasion. Rash is macular, papular and vesicular. Comments: Several clusters of poison ivy type rash to the arms. I do not see any other rash on the other parts of his body. Neurological:      Mental Status: He is alert and oriented to person, place, and time. GCS: GCS eye subscore is 4. GCS verbal subscore is 5. GCS motor subscore is 6. Cranial Nerves: No cranial nerve deficit. Sensory: No sensory deficit. Coordination: Coordination normal.      Gait: Gait normal.         Procedures    MDM  Number of Diagnoses or Management Options  Rhus dermatitis  Diagnosis management comments: Patient is diabetic. I will give him a dose of an IM Decadron here but I will place him on a steroid cream to use to those areas. He may use antihistamines. Instructions given.           --------------------------------------------- PAST HISTORY ---------------------------------------------  Past Medical History:  has a past medical history of Cervical radiculopathy, Degeneration, intervertebral disc, cervical, Diabetes mellitus ([address] Headache(784.0), Hyperlipidemia, Hypertension, Low back pain radiating to left lower extremity, Peptic ulcer, Spinal stenosis, and Type 2 diabetes mellitus without complication, without long-term current use of insulin ([address] Past Surgical History:  has a past surgical history that includes Cholecystectomy, laparoscopic (2010); lymph node biopsy; back surgery; Anus surgery (Feb 2013); Nerve Block (Left, 12/14/2020); Nerve Block (Left, 02/01/2021); and Nerve Block (Left, 2/1/2021). Social History:  reports that he quit smoking about 19 years ago. He has a 37.50 pack-year smoking history. He has never used smokeless tobacco. He reports current alcohol use. He reports that he does not use drugs.     Family History: family history includes Cancer in his father; Heart Disease in his brother and father; Heart Surgery in his father; High Cholesterol in his brother and sister; Hypertension in his father and mother. The patients home medications have been reviewed. Allergies: Lopid [gemfibrozil]    -------------------------------------------------- RESULTS -------------------------------------------------  No results found for this visit on 06/08/21. No orders to display       ------------------------- NURSING NOTES AND VITALS REVIEWED ---------------------------   The nursing notes within the ED encounter and vital signs as below have been reviewed. /84   Pulse 64   Temp 98.2 °F (36.8 °C) (Infrared)   Resp 20   Ht 5' 6\" (1.676 m)   Wt 184 lb (83.5 kg)   SpO2 97%   BMI 29.70 kg/m²   Oxygen Saturation Interpretation: Normal      ------------------------------------------ PROGRESS NOTES ------------------------------------------   I have spoken with the patient and discussed todays results, in addition to providing specific details for the plan of care and counseling regarding the diagnosis and prognosis. Their questions are answered at this time and they are agreeable with the plan.      --------------------------------- ADDITIONAL PROVIDER NOTES ---------------------------------     This patient is stable for discharge. I have shared the specific conditions for return, as well as the importance of follow-up. * NOTE: This report was transcribed using voice recognition software. Every effort was made to ensure accuracy; however, inadvertent computerized transcription errors may be present.    --------------------------------- IMPRESSION AND DISPOSITION ---------------------------------    IMPRESSION  1.  Rhus dermatitis        DISPOSITION  Disposition: Discharge to home  Patient condition is good       Pretty James PA-C  06/08/21 2872

## 2021-06-14 ENCOUNTER — OFFICE VISIT (OUTPATIENT)
Dept: FAMILY MEDICINE CLINIC | Age: 61
End: 2021-06-14
Payer: COMMERCIAL

## 2021-06-14 VITALS
BODY MASS INDEX: 29.89 KG/M2 | WEIGHT: 186 LBS | HEART RATE: 76 BPM | RESPIRATION RATE: 17 BRPM | SYSTOLIC BLOOD PRESSURE: 136 MMHG | DIASTOLIC BLOOD PRESSURE: 82 MMHG | OXYGEN SATURATION: 98 % | TEMPERATURE: 97.4 F | HEIGHT: 66 IN

## 2021-06-14 DIAGNOSIS — R07.9 CHEST PAIN, UNSPECIFIED TYPE: Primary | ICD-10-CM

## 2021-06-14 DIAGNOSIS — R53.83 FATIGUE, UNSPECIFIED TYPE: ICD-10-CM

## 2021-06-14 PROCEDURE — G8427 DOCREV CUR MEDS BY ELIG CLIN: HCPCS | Performed by: PHYSICIAN ASSISTANT

## 2021-06-14 PROCEDURE — 93000 ELECTROCARDIOGRAM COMPLETE: CPT | Performed by: PHYSICIAN ASSISTANT

## 2021-06-14 PROCEDURE — G8417 CALC BMI ABV UP PARAM F/U: HCPCS | Performed by: PHYSICIAN ASSISTANT

## 2021-06-14 PROCEDURE — 99214 OFFICE O/P EST MOD 30 MIN: CPT | Performed by: PHYSICIAN ASSISTANT

## 2021-06-14 PROCEDURE — 1036F TOBACCO NON-USER: CPT | Performed by: PHYSICIAN ASSISTANT

## 2021-06-14 PROCEDURE — 3017F COLORECTAL CA SCREEN DOC REV: CPT | Performed by: PHYSICIAN ASSISTANT

## 2021-06-14 NOTE — PROGRESS NOTES
Chief Complaint:       Abdominal Pain (upper chest pain but has been fighting poison oak for two weeks )      History of Present Illness   Source of history provided by:  patient. Gustabo Cain is a 64 y.o. old male who has a past medical history of:   Past Medical History:   Diagnosis Date    Cervical radiculopathy     Bilateral upper extemities    Degeneration, intervertebral disc, cervical     C4-C5 through C6-C7 levels    Diabetes mellitus ([address]     Headache(784.0)     Hyperlipidemia     Hypertension     Low back pain radiating to left lower extremity 2/17/2020    Peptic ulcer 1/21/2020    Spinal stenosis     Congenital     Type 2 diabetes mellitus without complication, without long-term current use of insulin ([address] 8/15/2016    Presents to the walk in clinic for complaints of pressure to his upper anterior chest to his mid abdomen/epigastric area onset over the last  abdominal pain which began 1 week or more. The patient reports he did have a bad plant dermatitis and was given an injection of this under local urgent care but he states that his symptoms have begun prior to that time. He states he has been under some stress as his daughter is finally getting  in a few weeks and her wedding was canceled last year because of Covid. He states he is unsure whether or not this could be stress related. He states that he also was in the store the other day with his daughter and he had a \"confusing episode\". He states that he is a diabetic and he had not eaten until earlier that day and then his daughter got him a chicken sandwich to eat and he was fine. He denied having any healthy speaking at that time, severe headache or visual changes. His daughter is a nurse and he states that she reported no strokelike symptoms. He reports that in the past he has had some problems with \"anxiety\" and is unsure if his current chest symptoms could be related to that.  He did see Dr. Alida Diaz from cardiology in the past and had a stress test, but he states it has been quite some time ago. He denies any associated jaw pain, shoulder pain or upper back pain. He reports no associated  Nausea,  vomiting or diarrhea. He denies any fever, chills, CP, SOB, dysuria, hematuria, change in stool color/consistency, melena, hematemesis, coffee-ground emesis, HA, sore throat or rash. He did complete his COVID vaccines several months ago. He reports that he has no leg pain or swelling. He does admit to being much more fatigued than usual and could just \"go to bed every day and sleep\" and is just exhausted every day. He has had colonoscopy in the recent past and reports everything was fine and denies any blood noted in his stools. ROS    Unless otherwise stated in this report or unable to obtain because of the patient's clinical or mental status as evidenced by the medical record, this patients's positive and negative responses for Review of Systems, constitutional, psych, eyes, ENT, cardiovascular, respiratory, gastrointestinal, neurological, genitourinary, musculoskeletal, integument systems and systems related to the presenting problem are either stated in the preceding or were not pertinent or were negative for the symptoms and/or complaints related to the medical problem.     Past Medical History:   Past Surgical History:   Procedure Laterality Date    ANUS SURGERY  Feb 2013    Excision Rianna-anal condyloma, anoscopy    BACK SURGERY      epidural    CHOLECYSTECTOMY, LAPAROSCOPIC  2010    LYMPH NODE BIOPSY      benign    NERVE BLOCK Left 12/14/2020    LEFT L4 AND L5 TRANSFORAMINAL EPIDURAL STEROID INJECTION (CPT 05728) performed by Lazarus Joseph, [facility] at [facility] 02/01/2021    TFESI    NERVE BLOCK Left 2/1/2021    LEFT L4 AND L5 TRANSFORAMINAL EPIDURAL STEROID INJECTION WITH SEDATION (CPT 14210) performed by Lazarus Joseph, MD at [address] [place] [address] History:  reports that he quit smoking about 19 years ago. He has a 37.50 pack-year smoking history. He has never used smokeless tobacco. He reports current alcohol use. He reports that he does not use drugs. Family History: family history includes Cancer in his father; Heart Disease in his brother and father; Heart Surgery in his father; High Cholesterol in his brother and sister; Hypertension in his father and mother. Allergies: Lopid [gemfibrozil]    Physical Exam         VS:  /82 (Site: Left Upper Arm, Position: Sitting, Cuff Size: Large Adult)   Pulse 76   Temp 97.4 °F (36.3 °C) (Temporal)   Resp 17   Ht 5' 6\" (1.676 m)   Wt 186 lb (84.4 kg)   SpO2 98%   BMI 30.02 kg/m²    Oxygen Saturation Interpretation: Normal.    General Appearance/Constitutional:  Alert, development consistent with age, NAD. HEENT:  NCAT. Eyes: PERRLA , EOMI bilaterally, conjunctiva pink, no scleral icterus. Ears and Nose; No discharge or bleeding. Mouth: mucosa moist, tongue midline. Lungs: Clear to ausculation without wheezing, rales, or rhonchi. No anterior chest wall or bilateral rib tenderness. Heart:  Regular rhythm with no ectopy, no, rubs, or gallops. Abdomen:  General Appearance: No obvious trauma or bruising. No rashes or lesions. Bowel sounds: BS+x4       Distension:  No distension. Tenderness: No localized tenderness to mid epigastric region or lower abdominal tendernesss, non-distended without guarding, rebound, or rigidity. Liver/Spleen: Non-tender and no hepatosplenomegaly. Pulsatile Mass: None noted. Back: CVA Tenderness: No bilateral tenderness or bruising. Skin:  Normal turgor. Warm, dry, without visible rash, unless noted elsewhere. Neurological:  Orientation age-appropriate. Motor functions intact. CN 2 through 12 intact. No facial paresthesias or asymmetry. Upper extremity and lower extremity muscle strength 5/5 equal bilaterally.   Heel-to-shin intact bilaterally, finger-to-nose intact bilaterally. Gait steady with no ataxia. Speech clear, no slurring or garbled words noted. EKG: This EKG is signed and interpreted by me.  1528  Rate: 69  Rhythm: Sinus  Interpretation: no acute changes and non-specific EKG  Comparison: Stable when compared to previous EKG from 12-28-20      Assessment / Plan     Impression(s):  Tanvir Cooley was seen today for abdominal pain. Diagnoses and all orders for this visit:    Chest pain, unspecified type  -     EKG 12 Lead  -     XR CHEST STANDARD (2 VW); Future    Fatigue, unspecified type  -     XR CHEST STANDARD (2 VW); Future      Disposition:  Disposition: Discharge to home    Patient advised of stable EKG, but despite this, it does not rule out cardiac problem or issue. Patient vies that his symptoms could be related to some stress or anxiety, but we cannot just get that this could be related to his heart. Patient was advised of need to see his PCP for close follow-up and I will discuss this with his PCP today. He was advised that he cannot wait until August to see her for close follow-up. He may need to see cardiology for another stress test.  Reviewed most recent labs. Would like to check 2 view chest as well, history of asbestos exposure in the past.  . No Rx's given from office currently. Increase fluids and rest. Avoid spicy foods, caffeine, and alcohol to prevent exacerbation. Advise close and continued follow up with PCPfor recheck and further workup as indicated. ED sooner if symptoms worsen or change. ED immediately with the development of fever, shaking chills, body aches, severe/worsening pain, lethargy, melena, hematochezia, hematemesis, coffee-ground emesis, CP, or SOB. Pt is in agreement with this care plan. All questions answered.       Riddhi Canales PA-C

## 2021-06-14 NOTE — PATIENT INSTRUCTIONS
Patient Education        Chest Pain: Care Instructions  Your Care Instructions     There are many things that can cause chest pain. Some are not serious and will get better on their own in a few days. But some kinds of chest pain need more testing and treatment. Your doctor may have recommended a follow-up visit in the next 8 to 12 hours. If you are not getting better, you may need more tests or treatment. Even though your doctor has released you, you still need to watch for any problems. The doctor carefully checked you, but sometimes problems can develop later. If you have new symptoms or if your symptoms do not get better, get medical care right away. If you have worse or different chest pain or pressure that lasts more than 5 minutes or you passed out (lost consciousness), call 911 or seek other emergency help right away. A medical visit is only one step in your treatment. Even if you feel better, you still need to do what your doctor recommends, such as going to all suggested follow-up appointments and taking medicines exactly as directed. This will help you recover and help prevent future problems. How can you care for yourself at home? · Rest until you feel better. · Take your medicine exactly as prescribed. Call your doctor if you think you are having a problem with your medicine. · Do not drive after taking a prescription pain medicine. When should you call for help? Call 911 if:     · You passed out (lost consciousness).     · You have severe difficulty breathing.     · You have symptoms of a heart attack. These may include:  ? Chest pain or pressure, or a strange feeling in your chest.  ? Sweating. ? Shortness of breath. ? Nausea or vomiting. ? Pain, pressure, or a strange feeling in your back, neck, jaw, or upper belly or in one or both shoulders or arms. ? Lightheadedness or sudden weakness. ? A fast or irregular heartbeat.   After you call 911, the  may tell you to chew 1 adult-strength or 2 to 4 low-dose aspirin. Wait for an ambulance. Do not try to drive yourself. Call your doctor today if:     · You have any trouble breathing.     · Your chest pain gets worse.     · You are dizzy or lightheaded, or you feel like you may faint.     · You are not getting better as expected.     · You are having new or different chest pain. Where can you learn more? Go to https://EventToolpepiceweb.MVP Vault. org and sign in to your [employer] account. Enter A120 in the [employer] box to learn more about \"Chest Pain: Care Instructions. \"     If you do not have an account, please click on the \"Sign Up Now\" link. Current as of: February 26, 2020               Content Version: 12.8  © 9232-9760 [employer]. Care instructions adapted under license by [facility] ([facility]). If you have questions about a medical condition or this instruction, always ask your healthcare professional. [place] [address] any warranty or liability for your use of this information. Patient Education        Fatigue: Care Instructions  Your Care Instructions     Fatigue is a feeling of tiredness, exhaustion, or lack of energy. You may feel fatigue because of too much or not enough activity. It can also come from stress, lack of sleep, boredom, and poor diet. Many medical problems, such as viral infections, can cause fatigue. Emotional problems, especially depression, are often the cause of fatigue. Fatigue is most often a symptom of another problem. Treatment for fatigue depends on the cause. For example, if you have fatigue because you have a certain health problem, treating this problem also treats your fatigue. If depression or anxiety is the cause, treatment may help. Follow-up care is a key part of your treatment and safety. Be sure to make and go to all appointments, and call your doctor if you are having problems.  It's also a good idea to know your test results and keep a list of the medicines you take. How can you care for yourself at home? · Get regular exercise. But don't overdo it. Go back and forth between rest and exercise. · Get plenty of rest.  · Eat a healthy diet. Do not skip meals, especially breakfast.  · Reduce your use of caffeine, tobacco, and alcohol. Caffeine is most often found in coffee, tea, cola drinks, and chocolate. · Limit medicines that can cause fatigue. This includes tranquilizers and cold and allergy medicines. When should you call for help? Watch closely for changes in your health, and be sure to contact your doctor if:    · You have new symptoms such as fever or a rash.     · Your fatigue gets worse.     · You have been feeling down, depressed, or hopeless. Or you may have lost interest in things that you usually enjoy.     · You are not getting better as expected. Where can you learn more? Go to https://[employer] Technology. org and sign in to your [employer] account. Enter U533 in the Brandfolder box to learn more about \"Fatigue: Care Instructions. \"     If you do not have an account, please click on the \"Sign Up Now\" link. Current as of: February 26, 2020               Content Version: 12.8  © 2006-2021 [employer]. Care instructions adapted under license by [facility] ([facility]). If you have questions about a medical condition or this instruction, always ask your healthcare professional. [place] [address] any warranty or liability for your use of this information.

## 2021-06-14 NOTE — Clinical Note
As we discussed, I do want him to wait to see you until August. Thanks  Esteban Finnegan  May need stress test now.

## 2021-06-15 ENCOUNTER — TELEPHONE (OUTPATIENT)
Dept: FAMILY MEDICINE CLINIC | Age: 61
End: 2021-06-15

## 2021-06-16 NOTE — TELEPHONE ENCOUNTER
Called and scheduled patient for same day on Friday at 10, he stated he thinks it was from they benadryl that he was taking for the poison ivy has not had any other symptoms since he stopped the medication.

## 2021-06-18 ENCOUNTER — OFFICE VISIT (OUTPATIENT)
Dept: FAMILY MEDICINE CLINIC | Age: 61
End: 2021-06-18
Payer: COMMERCIAL

## 2021-06-18 VITALS
HEIGHT: 66 IN | OXYGEN SATURATION: 100 % | TEMPERATURE: 97.7 F | SYSTOLIC BLOOD PRESSURE: 118 MMHG | DIASTOLIC BLOOD PRESSURE: 72 MMHG | RESPIRATION RATE: 18 BRPM | HEART RATE: 81 BPM | BODY MASS INDEX: 30.41 KG/M2 | WEIGHT: 189.2 LBS

## 2021-06-18 DIAGNOSIS — I10 ESSENTIAL HYPERTENSION: ICD-10-CM

## 2021-06-18 DIAGNOSIS — R07.89 PRESSURE IN LEFT SIDE OF CHEST: Primary | ICD-10-CM

## 2021-06-18 PROCEDURE — 1036F TOBACCO NON-USER: CPT | Performed by: NURSE PRACTITIONER

## 2021-06-18 PROCEDURE — 3017F COLORECTAL CA SCREEN DOC REV: CPT | Performed by: NURSE PRACTITIONER

## 2021-06-18 PROCEDURE — 99213 OFFICE O/P EST LOW 20 MIN: CPT | Performed by: NURSE PRACTITIONER

## 2021-06-18 PROCEDURE — G8417 CALC BMI ABV UP PARAM F/U: HCPCS | Performed by: NURSE PRACTITIONER

## 2021-06-18 PROCEDURE — G8427 DOCREV CUR MEDS BY ELIG CLIN: HCPCS | Performed by: NURSE PRACTITIONER

## 2021-06-18 RX ORDER — ATENOLOL 25 MG/1
TABLET ORAL
Qty: 90 TABLET | Refills: 3 | Status: SHIPPED
Start: 2021-06-18 | End: 2021-09-27 | Stop reason: SDUPTHER

## 2021-06-18 ASSESSMENT — ENCOUNTER SYMPTOMS
VOMITING: 0
DIARRHEA: 0
CONSTIPATION: 0
COUGH: 0
SHORTNESS OF BREATH: 0
WHEEZING: 0
CHEST TIGHTNESS: 1
ABDOMINAL PAIN: 0
NAUSEA: 0
COLOR CHANGE: 0

## 2021-06-18 NOTE — ASSESSMENT & PLAN NOTE
Asymptomatic, continue current medications, medication adherence emphasized, lifestyle modifications recommended and will refer to cardiology for stress test even though symptoms have resolved wtih his hx of hypertension

## 2021-06-18 NOTE — PROGRESS NOTES
OFFICE PROGRESS NOTE  [address]  [address] [place] [id number]  Dept: [phone]   Chief Complaint   Patient presents with   [facility] ED Follow-up       ASSESSMENT/PLAN   1. Pressure in left side of chest  Assessment & Plan:   Asymptomatic, continue current medications, medication adherence emphasized, lifestyle modifications recommended and will refer to cardiology for stress test even though symptoms have resolved wtih his hx of hypertension   Orders:  -     Amb External Referral To Cardiology  2. Essential hypertension  -     atenolol (TENORMIN) 25 MG tablet; TAKE ONE TABLET BY MOUTH ONCE A DAY., Disp-90 tablet, R-3Normal         Reviewed notes from Morelia Blankenship PA-C 6/14/21, UC notes 6/8/21  Reviewed radiology CXR 6/14/21 and EKG 6/14/21    Discussed taking medications as directed and adverse effects    Return for keep follow as scheduled. HPI:     He was in the ER 6/8/21 for Rhus Dermatitis he was given steroid injection and then he started using benadryl at night and a few days later he started to notice a funny feeling in the chest and stomach he came to [facility] and saw Morelia Blankenship PA-C. He stopped the Benadryl and all of his symptoms went away. He states when he was on the Benadryl he had a funny feeling in his stomach and he would eat but it wouldn't go away. Today he says he isn't having any more problems. He does see Dr Javed Jain and last stress test was several years ago. His daughter has upcoming wedding. He denies any CP, SOB, abdominal pain at this time. Will have him see Dr Javed Jain back for stress test given his hx of hypertension.  Needs refill on atenolol today        Current Outpatient Medications:     atenolol (TENORMIN) 25 MG tablet, TAKE ONE TABLET BY MOUTH ONCE A DAY., Disp: 90 tablet, Rfl: 3    hydrocortisone 2.5 % cream, Apply topically 2 times daily to affected areas of skin., Disp: 28 g, Rfl: 1    metFORMIN (GLUCOPHAGE-XR) 500 MG extended release tablet, Take 1 tablet by mouth 2 times daily (with meals), Disp: 180 tablet, Rfl: 1    blood glucose monitor strips, Test 1 times a day & as needed for symptoms of irregular blood glucose., Disp: 100 strip, Rfl: 3    lisinopril (PRINIVIL;ZESTRIL) 10 MG tablet, Take 1 tablet by mouth daily, Disp: 90 tablet, Rfl: 3    empagliflozin (JARDIANCE) 25 MG tablet, Take 25 mg by mouth daily, Disp: 30 tablet, Rfl: 6    atorvastatin (LIPITOR) 40 MG tablet, Take 1 tablet by mouth daily, Disp: 90 tablet, Rfl: 3    Dulaglutide 1.5 MG/0.5ML SOPN, Inject 1.5 mg into the skin once a week, Disp: 4 pen, Rfl: 6    Blood Glucose Monitoring Suppl (ACURA BLOOD GLUCOSE METER) w/Device KIT, 1 Device by Does not apply route daily, Disp: 1 kit, Rfl: 0    fluticasone (FLONASE) 50 MCG/ACT nasal spray, 1 spray by Nasal route daily, Disp: 3 Bottle, Rfl: 3    Multiple Vitamin (MULTI-VITAMIN) TABS, Take 1 tablet by mouth Daily. , Disp: , Rfl:   Social History     Socioeconomic History    Marital status:      Spouse name: None    Number of children: None    Years of education: None    Highest education level: None   Occupational History    None   Tobacco Use    Smoking status: Former Smoker     Packs/day: 1.50     Years: 25.00     Pack years: 37.50     Quit date: 2002     Years since quittin.3    Smokeless tobacco: Never Used   Vaping Use    Vaping Use: Never used   Substance and Sexual Activity    Alcohol use: Yes     Comment: Social     Drug use: No    Sexual activity: Yes     Partners: Female   Other Topics Concern    None   Social History Narrative    None     Social Determinants of Health     Financial Resource Strain: Low Risk     Difficulty of Paying Living Expenses: Not hard at all   Food Insecurity: No Food Insecurity    Worried About Running Out of Food in the Last Year: Never true    Jayesh of Food in the Last Year: Never true   Transportation Needs: No Transportation Needs    Lack of Transportation (Medical): No    Lack of Transportation (Non-Medical): No   Physical Activity:     Days of Exercise per Week:     Minutes of Exercise per Session:    Stress:     Feeling of Stress :    Social Connections:     Frequency of Communication with Friends and Family:     Frequency of Social Gatherings with Friends and Family:     Attends Church Services:     Active Member of Clubs or Organizations:     Attends Club or Organization Meetings:     Marital Status:    Intimate Partner Violence:     Fear of Current or Ex-Partner:     Emotionally Abused:     Physically Abused:     Sexually Abused:        I have reviewed Rohan's allergies, medications, problem list, medical, social and family history and have updated as needed in the electronic medical record    Review of Systems   Constitutional: Negative for activity change, appetite change, chills, diaphoresis, fatigue, fever and unexpected weight change. Respiratory: Positive for chest tightness. Negative for cough, shortness of breath and wheezing. Cardiovascular: Negative for chest pain, palpitations and leg swelling. Gastrointestinal: Negative for abdominal pain, constipation, diarrhea, nausea and vomiting. Skin: Positive for rash. Negative for color change, pallor and wound. Neurological: Negative for dizziness, light-headedness, numbness and headaches.        OBJECTIVE:     VS:  Wt Readings from Last 3 Encounters:   06/18/21 189 lb 3.2 oz (85.8 kg)   06/14/21 186 lb (84.4 kg)   06/08/21 184 lb (83.5 kg)                       Vitals:    06/18/21 1000   BP: 118/72   Pulse: 81   Resp: 18   Temp: 97.7 °F (36.5 °C)   SpO2: 100%   Weight: 189 lb 3.2 oz (85.8 kg)   Height: 5' 6\" (1.676 m)       General: Alert and oriented to person, place, and time, well developed and well nourished, in no acute distress  SKIN: Warm and dry, red flat rash on arms  HEAD: normocephalic, atraumatic  Neck: supple and non-tender without mass, trachea midline, no cervical lymphadenopathy, no bruit, no thyromegaly or nodules  Cardiovascular: regular rate and regular rhythm, normal S1 and S2,  no murmurs, rubs, clicks, or gallop. Distal pulses intact, no carotid bruits. No edema  Pulmonary/Chest: clear to auscultation bilaterally, no wheezes, rales or rhonchi, normal air movement, no respiratory distress  Abdomen: soft, non-tender, non-distended, normal bowel sounds, no masses or hepatosplenomegaly  Neurologic:  gait, coordination and speech normal  Extremities: no clubbing, cyanosis, or edema. Psychiatric: Good eye contact, normal mood and affect, answers questions appropriately    I have reviewed my findings and recommendations with Madison Hanna.     Coty Pisano, SUZIE - CNP, NP-C, FNP-BC

## 2021-07-29 ENCOUNTER — TELEPHONE (OUTPATIENT)
Dept: FAMILY MEDICINE CLINIC | Age: 61
End: 2021-07-29

## 2021-07-29 NOTE — TELEPHONE ENCOUNTER
----- Message from Jadyn Pederson sent at 7/29/2021  2:32 PM EDT -----  Subject: Message to Provider    QUESTIONS  Information for Provider? Patient had to r/s appt on 8/26/2021 due to work   changes and the next available was 9/27/2021. He would like to be seen   sooner if possible any day after 3pm. Patient would prefers in person appt     ---------------------------------------------------------------------------  --------------  [address]  What is the best way for the office to contact you? OK to leave message on   voicemail  Preferred Call Back Phone Number? [phone]  ---------------------------------------------------------------------------  --------------  SCRIPT ANSWERS  Relationship to Patient?  Self

## 2021-08-03 NOTE — TELEPHONE ENCOUNTER
Called and left message offering pt apt on the 25th of august at 3:40pm  asked for call back to confirm.

## 2021-09-07 DIAGNOSIS — E11.9 TYPE 2 DIABETES MELLITUS WITHOUT COMPLICATION, WITHOUT LONG-TERM CURRENT USE OF INSULIN (HCC): ICD-10-CM

## 2021-09-08 RX ORDER — EMPAGLIFLOZIN 25 MG/1
25 TABLET, FILM COATED ORAL DAILY
Qty: 30 TABLET | Refills: 0 | Status: SHIPPED
Start: 2021-09-08 | End: 2021-09-27 | Stop reason: SDUPTHER

## 2021-09-13 DIAGNOSIS — E11.9 TYPE 2 DIABETES MELLITUS WITHOUT COMPLICATION, WITHOUT LONG-TERM CURRENT USE OF INSULIN (HCC): ICD-10-CM

## 2021-09-27 ENCOUNTER — OFFICE VISIT (OUTPATIENT)
Dept: FAMILY MEDICINE CLINIC | Age: 61
End: 2021-09-27
Payer: COMMERCIAL

## 2021-09-27 VITALS
SYSTOLIC BLOOD PRESSURE: 118 MMHG | TEMPERATURE: 97.8 F | BODY MASS INDEX: 29.96 KG/M2 | DIASTOLIC BLOOD PRESSURE: 74 MMHG | WEIGHT: 186.4 LBS | RESPIRATION RATE: 18 BRPM | OXYGEN SATURATION: 97 % | HEIGHT: 66 IN | HEART RATE: 79 BPM

## 2021-09-27 DIAGNOSIS — E11.9 TYPE 2 DIABETES MELLITUS WITHOUT COMPLICATION, WITHOUT LONG-TERM CURRENT USE OF INSULIN (HCC): Primary | ICD-10-CM

## 2021-09-27 DIAGNOSIS — Z12.5 SCREENING PSA (PROSTATE SPECIFIC ANTIGEN): ICD-10-CM

## 2021-09-27 DIAGNOSIS — H81.13 BPPV (BENIGN PAROXYSMAL POSITIONAL VERTIGO), BILATERAL: ICD-10-CM

## 2021-09-27 DIAGNOSIS — E78.2 MIXED HYPERLIPIDEMIA: ICD-10-CM

## 2021-09-27 DIAGNOSIS — Z23 NEEDS FLU SHOT: ICD-10-CM

## 2021-09-27 DIAGNOSIS — I10 ESSENTIAL HYPERTENSION: ICD-10-CM

## 2021-09-27 PROBLEM — L98.9 BACK SKIN LESION: Status: RESOLVED | Noted: 2017-05-01 | Resolved: 2021-09-27

## 2021-09-27 PROBLEM — L84 CORNS AND CALLUS: Status: RESOLVED | Noted: 2020-07-09 | Resolved: 2021-09-27

## 2021-09-27 PROBLEM — R07.89 PRESSURE IN LEFT SIDE OF CHEST: Status: RESOLVED | Noted: 2021-06-18 | Resolved: 2021-09-27

## 2021-09-27 LAB
CHP ED QC CHECK: NORMAL
GLUCOSE BLD-MCNC: 155 MG/DL
HBA1C MFR BLD: 6.3 %

## 2021-09-27 PROCEDURE — 90694 VACC AIIV4 NO PRSRV 0.5ML IM: CPT | Performed by: NURSE PRACTITIONER

## 2021-09-27 PROCEDURE — 83036 HEMOGLOBIN GLYCOSYLATED A1C: CPT | Performed by: NURSE PRACTITIONER

## 2021-09-27 PROCEDURE — 82962 GLUCOSE BLOOD TEST: CPT | Performed by: NURSE PRACTITIONER

## 2021-09-27 PROCEDURE — G8417 CALC BMI ABV UP PARAM F/U: HCPCS | Performed by: NURSE PRACTITIONER

## 2021-09-27 PROCEDURE — 3044F HG A1C LEVEL LT 7.0%: CPT | Performed by: NURSE PRACTITIONER

## 2021-09-27 PROCEDURE — 99214 OFFICE O/P EST MOD 30 MIN: CPT | Performed by: NURSE PRACTITIONER

## 2021-09-27 PROCEDURE — G8427 DOCREV CUR MEDS BY ELIG CLIN: HCPCS | Performed by: NURSE PRACTITIONER

## 2021-09-27 PROCEDURE — 3017F COLORECTAL CA SCREEN DOC REV: CPT | Performed by: NURSE PRACTITIONER

## 2021-09-27 PROCEDURE — 90471 IMMUNIZATION ADMIN: CPT | Performed by: NURSE PRACTITIONER

## 2021-09-27 PROCEDURE — 1036F TOBACCO NON-USER: CPT | Performed by: NURSE PRACTITIONER

## 2021-09-27 PROCEDURE — 2022F DILAT RTA XM EVC RTNOPTHY: CPT | Performed by: NURSE PRACTITIONER

## 2021-09-27 RX ORDER — ATORVASTATIN CALCIUM 40 MG/1
40 TABLET, FILM COATED ORAL DAILY
Qty: 90 TABLET | Refills: 1 | Status: SHIPPED
Start: 2021-09-27 | End: 2021-12-17 | Stop reason: SDUPTHER

## 2021-09-27 RX ORDER — LANCETS 30 GAUGE
1 EACH MISCELLANEOUS DAILY
Qty: 100 EACH | Refills: 5 | Status: SHIPPED
Start: 2021-09-27 | End: 2022-06-02 | Stop reason: SDUPTHER

## 2021-09-27 RX ORDER — GLUCOSAMINE HCL/CHONDROITIN SU 500-400 MG
CAPSULE ORAL
Qty: 100 STRIP | Refills: 3 | Status: SHIPPED
Start: 2021-09-27 | End: 2021-09-27 | Stop reason: SDUPTHER

## 2021-09-27 RX ORDER — NITROGLYCERIN 0.4 MG/1
TABLET SUBLINGUAL
COMMUNITY
Start: 2021-07-13

## 2021-09-27 RX ORDER — ATENOLOL 25 MG/1
TABLET ORAL
Qty: 90 TABLET | Refills: 1 | Status: SHIPPED
Start: 2021-09-27 | End: 2021-12-17 | Stop reason: SDUPTHER

## 2021-09-27 RX ORDER — METFORMIN HYDROCHLORIDE 500 MG/1
500 TABLET, EXTENDED RELEASE ORAL 2 TIMES DAILY WITH MEALS
Qty: 180 TABLET | Refills: 1 | Status: SHIPPED
Start: 2021-09-27 | End: 2021-12-17 | Stop reason: SDUPTHER

## 2021-09-27 RX ORDER — GLUCOSAMINE HCL/CHONDROITIN SU 500-400 MG
CAPSULE ORAL
Qty: 100 STRIP | Refills: 3 | Status: SHIPPED
Start: 2021-09-27 | End: 2022-06-02 | Stop reason: SDUPTHER

## 2021-09-27 RX ORDER — EMPAGLIFLOZIN 25 MG/1
25 TABLET, FILM COATED ORAL DAILY
Qty: 30 TABLET | Refills: 3 | Status: SHIPPED
Start: 2021-09-27 | End: 2021-12-17 | Stop reason: SDUPTHER

## 2021-09-27 RX ORDER — LISINOPRIL 10 MG/1
10 TABLET ORAL DAILY
Qty: 90 TABLET | Refills: 1 | Status: SHIPPED
Start: 2021-09-27 | End: 2021-12-17 | Stop reason: SDUPTHER

## 2021-09-27 ASSESSMENT — ENCOUNTER SYMPTOMS
SINUS PAIN: 0
NAUSEA: 0
BACK PAIN: 0
SINUS PRESSURE: 0
WHEEZING: 0
ABDOMINAL PAIN: 0
RHINORRHEA: 0
CHEST TIGHTNESS: 0
VOMITING: 0
CONSTIPATION: 0
VOICE CHANGE: 0
SORE THROAT: 0
COLOR CHANGE: 0
SHORTNESS OF BREATH: 0
TROUBLE SWALLOWING: 0
FACIAL SWELLING: 0
DIARRHEA: 0
COUGH: 0

## 2021-09-27 NOTE — ASSESSMENT & PLAN NOTE
Well-controlled, continue current plan pending work up below, medication adherence emphasized, lifestyle modifications recommended and fasting labs on Friday

## 2021-09-27 NOTE — PATIENT INSTRUCTIONS
The medication list included in this document is our record of what you are currently taking, including any changes that were made at today's visit.  If you find any differences when compared to your medications at home, or have any questions that were not answered at your visit, please contact the office. Patient Education        Benign Paroxysmal Positional Vertigo (BPPV): Care Instructions  Your Care Instructions     Benign paroxysmal positional vertigo, also called BPPV, is an inner ear problem. It causes a spinning or whirling sensation when you move your head. This sensation is called vertigo. The vertigo usually lasts for less than a minute. People often have vertigo spells for a few days or weeks. Then the vertigo goes away. But it may come back again. The vertigo may be mild, or it may be bad enough to cause unsteadiness, nausea, and vomiting. When you move, your inner ear sends messages to the brain. This helps you keep your balance. Vertigo can happen when debris builds up in the inner ear. The buildup can cause the inner ear to send the wrong message to the brain. Your doctor may move you in different positions to help your vertigo get better faster. This is called the Epley maneuver. Your doctor may also prescribe medicines or exercises to help with your symptoms. Follow-up care is a key part of your treatment and safety. Be sure to make and go to all appointments, and call your doctor if you are having problems. It's also a good idea to know your test results and keep a list of the medicines you take. How can you care for yourself at home? · If your doctor suggests that you do [place]-Daroff exercises:  ? Sit on the edge of a bed or sofa. Quickly lie down on the side that causes the worst vertigo. Lie on your side with your ear down. ? Stay in this position for at least 30 seconds or until the vertigo goes away. ? Sit up. If this causes vertigo, wait for it to stop.   ? Repeat the procedure on the other side. ? Repeat this 10 times. Do these exercises 2 times a day until the vertigo is gone. When should you call for help? Call 911 anytime you think you may need emergency care. For example, call if:    · You have symptoms of a stroke. These may include:  ? Sudden numbness, tingling, weakness, or loss of movement in your face, arm, or leg, especially on only one side of your body. ? Sudden vision changes. ? Sudden trouble speaking. ? Sudden confusion or trouble understanding simple statements. ? Sudden problems with walking or balance. ? A sudden, severe headache that is different from past headaches. Call your doctor now or seek immediate medical care if:    · You have new or worse nausea and vomiting.     · You have new symptoms such as hearing loss or roaring in your ears. Watch closely for changes in your health, and be sure to contact your doctor if:    · You are not getting better as expected.     · Your vertigo gets worse. Where can you learn more? Go to https://[email or web address] InternetpeDizzywoodeb.PublicVine. org and sign in to your [employer] account. Enter  in the [employer] box to learn more about \"Benign Paroxysmal Positional Vertigo (BPPV): Care Instructions. \"     If you do not have an account, please click on the \"Sign Up Now\" link. Current as of: December 2, 2020               Content Version: 13.0  © 5988-4815 Healthwise, Incorporated. Care instructions adapted under license by [facility] ([facility]). If you have questions about a medical condition or this instruction, always ask your healthcare professional. [place] [address] any warranty or liability for your use of this information.

## 2021-09-27 NOTE — ASSESSMENT & PLAN NOTE
New Asymptomatic, lifestyle modifications recommended and discussed العلي huong exercises if persists

## 2021-09-27 NOTE — PROGRESS NOTES
OFFICE PROGRESS NOTE  [address] [place] [address] [address]  [phone]  Dept: [phone]   Chief Complaint   Patient presents with    Diabetes       ASSESSMENT/PLAN   1. Type 2 diabetes mellitus without complication, without long-term current use of insulin ([facility])  Assessment & Plan:   Well-controlled, continue current medications, continue current treatment plan, medication adherence emphasized and lifestyle modifications recommended   Due for fasting labs. Bring new insurance formulary to I know what will be covered when new insurance takes effect  Orders:  -     POCT Glucose  -     POCT glycosylated hemoglobin (Hb A1C)  -     Diabetic Foot Exam  -     Dulaglutide 1.5 MG/0.5ML SOPN; Inject 1.5 mg into the skin once a week, Disp-4 pen, R-3Note increase in doseNormal  -     empagliflozin (JARDIANCE) 25 MG tablet; Take 25 mg by mouth daily, Disp-30 tablet, R-3Normal  -     metFORMIN (GLUCOPHAGE-XR) 500 MG extended release tablet; Take 1 tablet by mouth 2 times daily (with meals), Disp-180 tablet, R-1Plain metformin causes diarrhea, note increase in doseNormal  -     CBC Auto Differential; Future  -     Comprehensive Metabolic Panel; Future  -     Lipid Panel; Future  -     blood glucose monitor strips; Test 1 times a day & as needed for symptoms of irregular blood glucose., Disp-100 strip, R-3, Normal  -     Lancets MISC; DAILY Starting Mon 9/27/2021, Disp-100 each, R-5, Normal  2. Essential hypertension  -     atenolol (TENORMIN) 25 MG tablet; TAKE ONE TABLET BY MOUTH ONCE A DAY., Disp-90 tablet, R-1Normal  -     lisinopril (PRINIVIL;ZESTRIL) 10 MG tablet; Take 1 tablet by mouth daily, Disp-90 tablet, R-1Note change in doseNormal  -     CBC Auto Differential; Future  -     Comprehensive Metabolic Panel; Future  -     Lipid Panel; Future  3.  Mixed hyperlipidemia  Assessment & Plan:   Well-controlled, continue current plan pending work up below, medication adherence emphasized, lifestyle modifications recommended and fasting labs on Friday  Orders:  -     atorvastatin (LIPITOR) 40 MG tablet; Take 1 tablet by mouth daily, Disp-90 tablet, R-1Normal  -     Lipid Panel; Future  4. BPPV (benign paroxysmal positional vertigo), bilateral  Assessment & Plan:  New Asymptomatic, lifestyle modifications recommended and discussed العلي daroff exercises if persists  5. Needs flu shot  Assessment & Plan:  ·  Wash your hands regularly, and keep your hands away from your face. · Cover your mouth when you cough or sneeze. · Rest, plenty of fluids, Tylenol for body aches, fever. · Stay home from school, work, and other public places until you are feeling better and your fever has been gone for at least 24 hours. The fever needs to have gone away on its own without the help of medicine. · Ask people living with you to talk to their doctors about preventing the flu. They may get antiviral medicine to keep from getting the flu from you. · To prevent the flu in the future, get a flu vaccine every fall. Encourage people living with you to get the vaccine. ·    flu vaccine given today  Orders:  -     INFLUENZA, QUADV, ADJUVANTED, 65 YRS =, IM, PF, PREFILL SYR, 0.5ML (FLUAD)  6. Screening PSA (prostate specific antigen)  -     PSA screening; Future  Get PSA in March          Discussed weight loss, Discussed exercising 30 minutes daily and Discussed taking medications as directed and adverse effects    Return in about 3 months (around 12/27/2021) for DM, hyperlipidemia, HTN.     HPI:     64 y.o. male presents today for follow-up of diabetes mellcotyus. In-office bloodsugar is:   Results for POC orders placed in visit on 09/27/21   POCT glycosylated hemoglobin (Hb A1C)   Result Value Ref Range    Hemoglobin A1C 6.3 %   POCT Glucose   Result Value Ref Range    Glucose 155 mg/dL    QC OK? Patient reports being compliant to low carbohydrate diet and increasing weekly exercises.    Currently, °C)   Resp 18   Ht 5' 6\" (1.676 m)   Wt 186 lb 6.4 oz (84.6 kg)   SpO2 97%   BMI 30.09 kg/m²     Lab Results   Component Value Date    LABA1C 6.3 09/27/2021     Pain scale:0/10          Current Outpatient Medications:     atenolol (TENORMIN) 25 MG tablet, TAKE ONE TABLET BY MOUTH ONCE A DAY., Disp: 90 tablet, Rfl: 1    atorvastatin (LIPITOR) 40 MG tablet, Take 1 tablet by mouth daily, Disp: 90 tablet, Rfl: 1    Dulaglutide 1.5 MG/0.5ML SOPN, Inject 1.5 mg into the skin once a week, Disp: 4 pen, Rfl: 3    empagliflozin (JARDIANCE) 25 MG tablet, Take 25 mg by mouth daily, Disp: 30 tablet, Rfl: 3    lisinopril (PRINIVIL;ZESTRIL) 10 MG tablet, Take 1 tablet by mouth daily, Disp: 90 tablet, Rfl: 1    metFORMIN (GLUCOPHAGE-XR) 500 MG extended release tablet, Take 1 tablet by mouth 2 times daily (with meals), Disp: 180 tablet, Rfl: 1    blood glucose monitor strips, Test 1 times a day & as needed for symptoms of irregular blood glucose., Disp: 100 strip, Rfl: 3    Lancets MISC, 1 each by Does not apply route daily, Disp: 100 each, Rfl: 5    hydrocortisone 2.5 % cream, Apply topically 2 times daily to affected areas of skin., Disp: 28 g, Rfl: 1    Blood Glucose Monitoring Suppl (ACURA BLOOD GLUCOSE METER) w/Device KIT, 1 Device by Does not apply route daily, Disp: 1 kit, Rfl: 0    fluticasone (FLONASE) 50 MCG/ACT nasal spray, 1 spray by Nasal route daily, Disp: 3 Bottle, Rfl: 3    Multiple Vitamin (MULTI-VITAMIN) TABS, Take 1 tablet by mouth Daily. , Disp: , Rfl:     nitroGLYCERIN (NITROSTAT) 0.4 MG SL tablet, DISSOLVE 1 TABLET UNDER THE TONGUE AS NEEDED, Disp: , Rfl:       Surgical History:  has a past surgical history that includes Cholecystectomy, laparoscopic (2010); lymph node biopsy; back surgery; Anus surgery (Feb 2013); Nerve Block (Left, 12/14/2020); Nerve Block (Left, 02/01/2021); and Nerve Block (Left, 2/1/2021). Social History:  reports that he quit smoking about 19 years ago.  He has a 37.50 pack-year smoking history. He has never used smokeless tobacco. He reports current alcohol use. He reports that he does not use drugs. Family History: family history includes Cancer in his father; Heart Disease in his brother and father; Heart Surgery in his father; High Cholesterol in his brother and sister; Hypertension in his father and mother. I have reviewed Rohan's allergies, medications, problem list, medical, social and family history and have updated as needed in the electronic medical record    Review of Systems   Constitutional: Negative for activity change, appetite change, chills, diaphoresis, fatigue, fever and unexpected weight change. HENT: Negative for congestion, dental problem, drooling, ear discharge, ear pain, facial swelling, hearing loss, mouth sores, nosebleeds, postnasal drip, rhinorrhea, sinus pressure, sinus pain, sneezing, sore throat, tinnitus, trouble swallowing and voice change. Eyes: Negative for visual disturbance. Respiratory: Negative for cough, chest tightness, shortness of breath and wheezing. Cardiovascular: Negative for chest pain, palpitations and leg swelling. Gastrointestinal: Negative for abdominal pain, constipation, diarrhea, nausea and vomiting. Endocrine: Negative for cold intolerance, heat intolerance, polydipsia, polyphagia and polyuria. Genitourinary: Negative for difficulty urinating, frequency and urgency. Musculoskeletal: Negative for arthralgias, back pain, gait problem, joint swelling, myalgias, neck pain and neck stiffness. Skin: Negative for color change, pallor, rash and wound. Allergic/Immunologic: Negative for environmental allergies, food allergies and immunocompromised state. Neurological: Positive for dizziness ( intermittently only when laying down. ). Negative for tremors, seizures, syncope, facial asymmetry, speech difficulty, weakness, light-headedness, numbness and headaches. Hematological: Negative for adenopathy.  Does I have reviewed my findings and recommendations with Rosann Soulier.     Tiesha Vivar, SUZIE - CNP, NP-C, FNP-BC

## 2021-10-27 PROBLEM — Z12.5 SCREENING PSA (PROSTATE SPECIFIC ANTIGEN): Status: RESOLVED | Noted: 2021-09-27 | Resolved: 2021-10-27

## 2021-11-12 ENCOUNTER — OFFICE VISIT (OUTPATIENT)
Dept: PAIN MANAGEMENT | Age: 61
End: 2021-11-12
Payer: COMMERCIAL

## 2021-11-12 VITALS
OXYGEN SATURATION: 97 % | TEMPERATURE: 97.1 F | HEART RATE: 62 BPM | DIASTOLIC BLOOD PRESSURE: 82 MMHG | BODY MASS INDEX: 29.89 KG/M2 | WEIGHT: 186 LBS | SYSTOLIC BLOOD PRESSURE: 130 MMHG | RESPIRATION RATE: 16 BRPM | HEIGHT: 66 IN

## 2021-11-12 DIAGNOSIS — G89.4 CHRONIC PAIN SYNDROME: ICD-10-CM

## 2021-11-12 PROCEDURE — 99213 OFFICE O/P EST LOW 20 MIN: CPT | Performed by: PAIN MEDICINE

## 2021-11-12 PROCEDURE — 3017F COLORECTAL CA SCREEN DOC REV: CPT | Performed by: PAIN MEDICINE

## 2021-11-12 PROCEDURE — G8427 DOCREV CUR MEDS BY ELIG CLIN: HCPCS | Performed by: PAIN MEDICINE

## 2021-11-12 PROCEDURE — G8417 CALC BMI ABV UP PARAM F/U: HCPCS | Performed by: PAIN MEDICINE

## 2021-11-12 PROCEDURE — G8484 FLU IMMUNIZE NO ADMIN: HCPCS | Performed by: PAIN MEDICINE

## 2021-11-12 PROCEDURE — 1036F TOBACCO NON-USER: CPT | Performed by: PAIN MEDICINE

## 2021-11-12 NOTE — PROGRESS NOTES
Do you currently have any of the following:    Fever: No  Headache:  No  Cough: No  Shortness of breath: No  Exposed to anyone with these symptoms: Palma La Must presents to the [facility] on 11/12/2021. Angely Saravia is complaining of pain in center of his back. . The pain is intermittent. The pain is described as aching. Pain is rated on his best day at a 3, on his worst day at a 8, and on average at a 5 on the VAS scale. He took his last dose of aleve, advil/tylenol . Angely Saravia does not have issues with constipation. Any procedures since your last visit: No,    He is  on NSAIDS and  is not on anticoagulation medications to include none and is managed by SUZIE Blair CNP  . Pacemaker or defibrillator: No Physician managing device is NA. Medication Contract and Consent for Opioid Use Documents Filed      No documents found                   /82   Pulse 62   Temp 97.1 °F (36.2 °C) (Infrared)   Resp 16   Ht 5' 6\" (1.676 m)   Wt 186 lb (84.4 kg)   SpO2 97%   BMI 30.02 kg/m²      No LMP for male patient.

## 2021-11-12 NOTE — PROGRESS NOTES
[address]  [facility], [address] [place] [address] Chris  [phone]    Follow up Note      Baby Baumgarten     Date of Visit:  11/12/2021    CC:  Patient presents for follow up   Chief Complaint   Patient presents with    Follow-up     pain in right in center of back     HPI:    Follow up on his low back pain radiating down the Left lower extremity with no acute issues. Appropriate analgesia with current medications regimen:N/A. Change in quality of symptoms:no. Medication side effects:not applicable . Recent diagnostic testing:none. Recent interventional procedures:none     He has not been on anticoagulation medications to include none. The patient  has not been on herbal supplements.  The patient is diabetic.     Imaging:   Lumbar spine MRI 2020:  Degenerative disc disease as described above.         Spinal canal narrowing, moderate at L4-5.         Narrowing of the bilateral lateral recesses at L4-5, contacting left exiting    L4 and left descending L5 nerve roots, and contacting the right descending L5    nerve root.         Foraminal narrowing, severe at left L5-S1, moderate to severe at right L5-S1,    moderate at bilateral L4-5, mild at left L3-4.       Previous treatments: Physical Therapy and medications. .                                         Potential Aberrant Drug-Related Behavior:    No     Urine Drug Screening:  none     OARRS report:  11/2021 consistent    Opioid Agreement:  Date enacted:N/A  Renewal date:N/A    Past Medical History:   Diagnosis Date    Back skin lesion 5/1/2017    Cervical radiculopathy     Bilateral upper extemities    Corns and callus 7/9/2020    Degeneration, intervertebral disc, cervical     C4-C5 through C6-C7 levels    Diabetes mellitus (HCC)     Headache(784.0)     Hyperlipidemia     Hypertension     Low back pain radiating to left lower extremity 2/17/2020    Peptic ulcer 1/21/2020    Pressure in left side of chest 6/18/2021    Spinal stenosis     Congenital     Type 2 diabetes mellitus without complication, without long-term current use of insulin ([facility] [address] 8/15/2016     Past Surgical History:   Procedure Laterality Date    ANUS SURGERY  Feb 2013    Excision Rianna-anal condyloma, anoscopy    BACK SURGERY      epidural    CHOLECYSTECTOMY, LAPAROSCOPIC  2010    LYMPH NODE BIOPSY      benign    NERVE BLOCK Left 12/14/2020    LEFT L4 AND L5 TRANSFORAMINAL EPIDURAL STEROID INJECTION (CPT 53535) performed by Chantelle Martines MD at [facility] 02/01/2021    TFESI    NERVE BLOCK Left 2/1/2021    LEFT L4 AND L5 TRANSFORAMINAL EPIDURAL STEROID INJECTION WITH SEDATION (CPT 41522) performed by Chantelle Martines MD at [address] [place] [address]     Prior to Admission medications    Medication Sig Start Date End Date Taking? Authorizing Provider   nitroGLYCERIN (NITROSTAT) 0.4 MG SL tablet DISSOLVE 1 TABLET UNDER THE TONGUE AS NEEDED 7/13/21  Yes Historical Provider, MD   atenolol (TENORMIN) 25 MG tablet TAKE ONE TABLET BY MOUTH ONCE A DAY. 9/27/21  Yes SUZIE Bryant CNP   atorvastatin (LIPITOR) 40 MG tablet Take 1 tablet by mouth daily 9/27/21  Yes SUZIE Bryant CNP   Dulaglutide 1.5 MG/0.5ML SOPN Inject 1.5 mg into the skin once a week 9/27/21  Yes SUZIE Bryant CNP   empagliflozin (JARDIANCE) 25 MG tablet Take 25 mg by mouth daily 9/27/21  Yes SUZIE Bryant CNP   lisinopril (PRINIVIL;ZESTRIL) 10 MG tablet Take 1 tablet by mouth daily 9/27/21  Yes SUZIE Bryant CNP   metFORMIN (GLUCOPHAGE-XR) 500 MG extended release tablet Take 1 tablet by mouth 2 times daily (with meals) 9/27/21  Yes SUZIE Bryant CNP   blood glucose monitor strips Test 1 times a day & as needed for symptoms of irregular blood glucose.  9/27/21  Yes SUZIE Bryant CNP   Lancets MISC 1 each by Does not apply route daily 9/27/21  Yes SUZIE Quispe CNP   hydrocortisone 2.5 % cream Apply topically 2 times daily to affected areas of skin. 21  Yes Kody Persaud PA-C   Blood Glucose Monitoring Suppl (ACURA BLOOD GLUCOSE METER) w/Device KIT 1 Device by Does not apply route daily 20  Yes SUZIE Leyva CNP   fluticasone (FLONASE) 50 MCG/ACT nasal spray 1 spray by Nasal route daily 17  Yes SUZIE Martines CNP   Multiple Vitamin (MULTI-VITAMIN) TABS Take 1 tablet by mouth Daily. Yes Historical Provider, MD       Allergies   Allergen Reactions    Benadryl [Diphenhydramine]      Felt weird    Lopid [Gemfibrozil] Other (See Comments)     Enlarged prostate       Social History     Socioeconomic History    Marital status:      Spouse name: Not on file    Number of children: Not on file    Years of education: Not on file    Highest education level: Not on file   Occupational History    Not on file   Tobacco Use    Smoking status: Former Smoker     Packs/day: 1.50     Years: 25.00     Pack years: 45.53     Quit date: 2002     Years since quittin.7    Smokeless tobacco: Never Used   Vaping Use    Vaping Use: Never used   Substance and Sexual Activity    Alcohol use: Yes     Comment: Social     Drug use: No    Sexual activity: Yes     Partners: Female   Other Topics Concern    Not on file   Social History Narrative    Not on file     Social Determinants of Health     Financial Resource Strain: Low Risk     Difficulty of Paying Living Expenses: Not hard at all   Food Insecurity: No Food Insecurity    Worried About [address] in the Last Year: Never true    [address] in the Last Year: Never true   Transportation Needs: No Transportation Needs    Lack of Transportation (Medical): No    Lack of Transportation (Non-Medical):  No   Physical Activity:     Days of Exercise per Week: Not on file    Minutes of Exercise per Session: Not on file   Stress:     Feeling of Stress : Not on file   Social Connections:     Frequency of Communication with Friends and Family: Not on file    Frequency of Social Gatherings with Friends and Family: Not on file    Attends [employer] Services: Not on file    Active Member of Clubs or Organizations: Not on file    Attends Club or Organization Meetings: Not on file    Marital Status: Not on file   Intimate Partner Violence:     Fear of Current or Ex-Partner: Not on file    Emotionally Abused: Not on file    Physically Abused: Not on file    Sexually Abused: Not on file   Housing Stability: Unknown    Unable to Pay for Housing in the Last Year: No    Number of [place] in the Last Year: Not on file    Unstable Housing in the Last Year: No     Family History   Problem Relation Age of Onset    Hypertension Mother     Hypertension Father     Cancer Father         Prostate    Heart Surgery Father     Heart Disease Father     High Cholesterol Sister     High Cholesterol Brother     Heart Disease Brother      REVIEW OF SYSTEMS:     Magaly Mancini denies fever/chills, chest pain, shortness of breath, new bowel or bladder complaints. All other review of systems was negative. PHYSICAL EXAMINATION:      /82   Pulse 62   Temp 97.1 °F (36.2 °C) (Infrared)   Resp 16   Ht 5' 6\" (1.676 m)   Wt 186 lb (84.4 kg)   SpO2 97%   BMI 30.02 kg/m²     General:       General appearance:   pleasant and well-hydrated. , in no discomfort and A & O x3  Build:Normal Weight     HEENT:     Head:normocephalic and atraumatic  Sclera: icterus absent,      Lungs:     Breathing:Breathing Pattern: regular, no distress     Abdomen:     Shape:non-distended and normal  Tenderness:none     Lumbar spine:     Spine inspection:normal   CVA tenderness:No   Palpation:tenderness paravertebral muscles, facet loading, left, right, positive and tenderness.   Range of motion:abnormal moderately Lateral bending, flexion, extension rotation bilateral and is  painful.     Musculoskeletal:     Trigger points in Paraveteral:present right trapezius and infraspinatus. SI joint tenderness:negative right, negative left  SLR:negative right, negative left, sitting      Extremities:     Tremors:None bilaterally upper and lower  Range of motion:pain with internal rotation of hips negative. Normal ROM of right shoulder. Intact:Yes  Edema:Normal     Neurological:     Sensory:normal to light touch bilateral lower extremities. Motor:                             Right Quadriceps5/5          Left Quadriceps5/5           Right Gastrocnemius5/5    Left Gastrocnemius5/5  Right Ant Tibialis5/5  Left Ant Tibialis5/5  Reflexes:    Right Quadriceps reflex2+  Left Quadriceps reflex1+  Right Achilles reflex2+  Left Achilles reflex2+  Gait:normal     Dermatology:     Skin:no unusual rashes and no skin lesions    Impression:    Low back pain with radiation to the Left lower extremity along the Left L4 dermatome. Lumbar spine MRI 2020:L4-5 Left paracentral disc extrusion   Plan:  Follow up on his low back pain radiating down the Left lower extremity which continues to feel better. .  Discontinue Zanaflex 2 mg QHS for muscle spasms(not needed), discussed side effects  Continue with OTC pain medications. [facility] OARRS report reviewed 11/2021. Patient encouraged to stay active and to lose weight. Treatment plan discussed with the patient. We discussed with the patient that combining opioids, benzodiazepines, alcohol, illicit drugs or sleep aids increases the risk of respiratory depression including death. We discussed that these medications may cause drowsiness, sedation or dizziness and have counseled the patient not to drive or operate machinery. We have discussed that these medications will be prescribed only by one provider. We have discussed with the patient about age related risk factors and have thoroughly discussed the importance of taking these medications as prescribed. The patient verbalizes understanding. ccrefphaniing emory Arteaga M.D.

## 2021-12-17 ENCOUNTER — OFFICE VISIT (OUTPATIENT)
Dept: FAMILY MEDICINE CLINIC | Age: 61
End: 2021-12-17
Payer: COMMERCIAL

## 2021-12-17 VITALS
WEIGHT: 190.5 LBS | RESPIRATION RATE: 18 BRPM | HEART RATE: 58 BPM | TEMPERATURE: 97 F | HEIGHT: 66 IN | BODY MASS INDEX: 30.62 KG/M2 | DIASTOLIC BLOOD PRESSURE: 74 MMHG | OXYGEN SATURATION: 99 % | SYSTOLIC BLOOD PRESSURE: 126 MMHG

## 2021-12-17 DIAGNOSIS — Z83.2 FAMILY HISTORY OF CLOTTING DISORDER: ICD-10-CM

## 2021-12-17 DIAGNOSIS — E78.2 MIXED HYPERLIPIDEMIA: ICD-10-CM

## 2021-12-17 DIAGNOSIS — E11.9 TYPE 2 DIABETES MELLITUS WITHOUT COMPLICATION, WITHOUT LONG-TERM CURRENT USE OF INSULIN (HCC): Primary | ICD-10-CM

## 2021-12-17 DIAGNOSIS — H61.21 IMPACTED CERUMEN OF RIGHT EAR: ICD-10-CM

## 2021-12-17 DIAGNOSIS — I10 ESSENTIAL HYPERTENSION: ICD-10-CM

## 2021-12-17 LAB
CHP ED QC CHECK: NORMAL
CREATININE URINE POCT: NORMAL
GLUCOSE BLD-MCNC: 124 MG/DL
HBA1C MFR BLD: 6.5 %
MICROALBUMIN/CREAT 24H UR: NORMAL MG/G{CREAT}
MICROALBUMIN/CREAT UR-RTO: NORMAL

## 2021-12-17 PROCEDURE — 82962 GLUCOSE BLOOD TEST: CPT | Performed by: NURSE PRACTITIONER

## 2021-12-17 PROCEDURE — 1036F TOBACCO NON-USER: CPT | Performed by: NURSE PRACTITIONER

## 2021-12-17 PROCEDURE — G8417 CALC BMI ABV UP PARAM F/U: HCPCS | Performed by: NURSE PRACTITIONER

## 2021-12-17 PROCEDURE — 3044F HG A1C LEVEL LT 7.0%: CPT | Performed by: NURSE PRACTITIONER

## 2021-12-17 PROCEDURE — G8484 FLU IMMUNIZE NO ADMIN: HCPCS | Performed by: NURSE PRACTITIONER

## 2021-12-17 PROCEDURE — 99214 OFFICE O/P EST MOD 30 MIN: CPT | Performed by: NURSE PRACTITIONER

## 2021-12-17 PROCEDURE — 2022F DILAT RTA XM EVC RTNOPTHY: CPT | Performed by: NURSE PRACTITIONER

## 2021-12-17 PROCEDURE — 83036 HEMOGLOBIN GLYCOSYLATED A1C: CPT | Performed by: NURSE PRACTITIONER

## 2021-12-17 PROCEDURE — 3017F COLORECTAL CA SCREEN DOC REV: CPT | Performed by: NURSE PRACTITIONER

## 2021-12-17 PROCEDURE — G8427 DOCREV CUR MEDS BY ELIG CLIN: HCPCS | Performed by: NURSE PRACTITIONER

## 2021-12-17 PROCEDURE — 82044 UR ALBUMIN SEMIQUANTITATIVE: CPT | Performed by: NURSE PRACTITIONER

## 2021-12-17 RX ORDER — EMPAGLIFLOZIN 25 MG/1
25 TABLET, FILM COATED ORAL DAILY
Qty: 30 TABLET | Refills: 6 | Status: SHIPPED
Start: 2021-12-17 | End: 2022-06-02 | Stop reason: SDUPTHER

## 2021-12-17 RX ORDER — ATORVASTATIN CALCIUM 40 MG/1
40 TABLET, FILM COATED ORAL DAILY
Qty: 90 TABLET | Refills: 1 | Status: SHIPPED
Start: 2021-12-17 | End: 2022-06-02 | Stop reason: SDUPTHER

## 2021-12-17 RX ORDER — METFORMIN HYDROCHLORIDE 500 MG/1
500 TABLET, EXTENDED RELEASE ORAL 2 TIMES DAILY WITH MEALS
Qty: 180 TABLET | Refills: 1 | Status: SHIPPED
Start: 2021-12-17 | End: 2022-06-02 | Stop reason: SDUPTHER

## 2021-12-17 RX ORDER — LISINOPRIL 10 MG/1
10 TABLET ORAL DAILY
Qty: 90 TABLET | Refills: 1 | Status: SHIPPED
Start: 2021-12-17 | End: 2022-04-22 | Stop reason: SDUPTHER

## 2021-12-17 RX ORDER — ATENOLOL 25 MG/1
TABLET ORAL
Qty: 90 TABLET | Refills: 1 | Status: SHIPPED
Start: 2021-12-17 | End: 2022-06-02 | Stop reason: SDUPTHER

## 2021-12-17 ASSESSMENT — ENCOUNTER SYMPTOMS
BACK PAIN: 0
CONSTIPATION: 0
SORE THROAT: 0
VOMITING: 0
DIARRHEA: 0
SINUS PAIN: 0
CHEST TIGHTNESS: 0
SINUS PRESSURE: 0
VOICE CHANGE: 0
ABDOMINAL PAIN: 0
WHEEZING: 0
SHORTNESS OF BREATH: 0
FACIAL SWELLING: 0
COUGH: 1
RHINORRHEA: 0
TROUBLE SWALLOWING: 0
NAUSEA: 0
COLOR CHANGE: 0

## 2021-12-17 NOTE — ASSESSMENT & PLAN NOTE
New he is to call his insurance and see if they will cover the testing then let me know exactly what disorders the girls have and I will order the labs.

## 2021-12-17 NOTE — PROGRESS NOTES
OFFICE PROGRESS NOTE  [address] [place] [address] Rd  [phone]  Dept: [phone]   Chief Complaint   Patient presents with    Diabetes    Hyperlipidemia    Hypertension    Health Maintenance     Microalbumin Given       ASSESSMENT/PLAN   1. Type 2 diabetes mellitus without complication, without long-term current use of insulin (HCC)  -     POCT Glucose  -     POCT glycosylated hemoglobin (Hb A1C)  -     POCT Microalbumin  -     metFORMIN (GLUCOPHAGE-XR) 500 MG extended release tablet; Take 1 tablet by mouth 2 times daily (with meals), Disp-180 tablet, R-1Plain metformin causes diarrhea, note increase in doseNormal  -     Dulaglutide 1.5 MG/0.5ML SOPN; Inject 1.5 mg into the skin once a week, Disp-4 pen, R-6Note increase in doseNormal  -     empagliflozin (JARDIANCE) 25 MG tablet; Take 25 mg by mouth daily, Disp-30 tablet, R-6Will they cover 90 day supply? Normal  2. Essential hypertension  Assessment & Plan:   Well-controlled, continue current medications, medication adherence emphasized and lifestyle modifications recommended  Orders:  -     lisinopril (PRINIVIL;ZESTRIL) 10 MG tablet; Take 1 tablet by mouth daily, Disp-90 tablet, R-1Note change in doseNormal  -     atenolol (TENORMIN) 25 MG tablet; TAKE ONE TABLET BY MOUTH ONCE A DAY., Disp-90 tablet, R-1Normal  3. Mixed hyperlipidemia  Assessment & Plan:   Well-controlled, continue current medications, medication adherence emphasized and lifestyle modifications recommended  Orders:  -     atorvastatin (LIPITOR) 40 MG tablet; Take 1 tablet by mouth daily, Disp-90 tablet, R-1Normal  4. Impacted cerumen of right ear  Assessment & Plan:  New debrox to the right ear 5 - 10 drops daily for 5 days then try to irrigate the ear  5.  Family history of clotting disorder  Assessment & Plan:   New he is to call his insurance and see if they will cover the testing then let me know exactly what disorders the girls have and I will order the labs. Reviewed labs: CMP, CBCD, Lipids, 10/8/21      Discussed weight loss, Discussed exercising 30 minutes daily and Discussed taking medications as directed and adverse effects    Return in about 6 months (around 6/17/2022) for DM, hyperlipidemia, HTN.     HPI:     64 y.o. male presents today for follow-up of diabetes [place]. In-office bloodsugar is:   Results for POC orders placed in visit on 12/17/21   POCT Microalbumin   Result Value Ref Range    Microalb, Ur 10 mg/L     Creatinine Ur POCT 10mg/dL     Microalbumin Creatinine Ratio <30 mg/g    POCT glycosylated hemoglobin (Hb A1C)   Result Value Ref Range    Hemoglobin A1C 6.5 %   POCT Glucose   Result Value Ref Range    Glucose 124 mg/dL    QC OK? Patient reports being compliant to low carbohydrate diet and increasing weekly exercises. Currently, the patient is treated with medication(s): Jardiance 25 mg daily, Dulaglutide 1.5 mg SC weekly, metformin  mg BID. Patient reports checking home blood sugar 1 times per day. Patient states home blood sugar ranges from 117 - 201 in am fasting, averaging 160. Patient denies hypoglycemic episodes and understand to take sweetened beverages or a snack if hypoglycemic symptoms are suspected. Hypertension: Patient here for follow-up of elevated blood pressure. He is exercising and is adherent to low salt diet.  Blood pressure is well controlled at home. Cardiac symptoms none. Patient denies chest pain, chest pressure/discomfort, claudication, dyspnea, exertional chest pressure/discomfort, fatigue, irregular heart beat, lower extremity edema, near-syncope, orthopnea, palpitations, paroxysmal nocturnal dyspnea, syncope and tachypnea.  Cardiovascular risk factors: advanced age (older than 54 for men, 72 for women), diabetes mellitus, dyslipidemia, hypertension, male gender and obesity (BMI >= 30 kg/m2). Use of agents associated with hypertension: none.  History of target organ damage: none.      Hyperlipidemia: Patient presents with hyperlipidemia. He was tested because DM, HTN, hyperlipidemia. His last labs 10/8/21 showed Total cholesterol of 136, HDL 51, LDL 57,  Triglycerides 142. Denies chest pain, dyspnea, exertional chest pressure/discomfort, fatigue, feeding intolerance, lower extremity edema, palpitations, poor exercise tolerance, syncope, tachypnea and skin xanthelasma. There is a family history of hyperlipidemia. There is not a family history of early ischemia heart disease. He states he is having a lot of ringing in the right ear, cerumen impacted. Two of his 4 daughters have been found to have clotting disorders one has MTHFR and one thinks has factor 8 but not sure. He would like to be tested. He will find out the information for sure before we do any testing.      Today's vital signs are as follows:  /74   Pulse 58   Temp 97 °F (36.1 °C) (Temporal)   Resp 18   Ht 5' 6\" (1.676 m)   Wt 190 lb 8 oz (86.4 kg)   SpO2 99%   BMI 30.75 kg/m²     Lab Results   Component Value Date    LABA1C 6.5 12/17/2021     Pain scale:0/10        Current Outpatient Medications:     metFORMIN (GLUCOPHAGE-XR) 500 MG extended release tablet, Take 1 tablet by mouth 2 times daily (with meals), Disp: 180 tablet, Rfl: 1    lisinopril (PRINIVIL;ZESTRIL) 10 MG tablet, Take 1 tablet by mouth daily, Disp: 90 tablet, Rfl: 1    Dulaglutide 1.5 MG/0.5ML SOPN, Inject 1.5 mg into the skin once a week, Disp: 4 pen, Rfl: 6    atorvastatin (LIPITOR) 40 MG tablet, Take 1 tablet by mouth daily, Disp: 90 tablet, Rfl: 1    atenolol (TENORMIN) 25 MG tablet, TAKE ONE TABLET BY MOUTH ONCE A DAY., Disp: 90 tablet, Rfl: 1    empagliflozin (JARDIANCE) 25 MG tablet, Take 25 mg by mouth daily, Disp: 30 tablet, Rfl: 6    nitroGLYCERIN (NITROSTAT) 0.4 MG SL tablet, DISSOLVE 1 TABLET UNDER THE TONGUE AS NEEDED, Disp: , Rfl:     blood glucose monitor strips, Test 1 times a day & as needed for symptoms of irregular blood glucose., Disp: 100 strip, Rfl: 3    Lancets MISC, 1 each by Does not apply route daily, Disp: 100 each, Rfl: 5    Blood Glucose Monitoring Suppl (ACURA BLOOD GLUCOSE METER) w/Device KIT, 1 Device by Does not apply route daily, Disp: 1 kit, Rfl: 0    fluticasone (FLONASE) 50 MCG/ACT nasal spray, 1 spray by Nasal route daily, Disp: 3 Bottle, Rfl: 3    Multiple Vitamin (MULTI-VITAMIN) TABS, Take 1 tablet by mouth Daily. , Disp: , Rfl:     hydrocortisone 2.5 % cream, Apply topically 2 times daily to affected areas of skin. (Patient not taking: Reported on 12/17/2021), Disp: 28 g, Rfl: 1      Surgical History:  has a past surgical history that includes Cholecystectomy, laparoscopic (2010); lymph node biopsy; back surgery; Anus surgery (Feb 2013); Nerve Block (Left, 12/14/2020); Nerve Block (Left, 02/01/2021); and Nerve Block (Left, 2/1/2021). Social History:  reports that he quit smoking about 19 years ago. He has a 37.50 pack-year smoking history. He has never used smokeless tobacco. He reports current alcohol use. He reports that he does not use drugs. Family History: family history includes Cancer in his father; Heart Disease in his brother and father; Heart Surgery in his father; High Cholesterol in his brother and sister; Hypertension in his father and mother. I have reviewed Rohan's allergies, medications, problem list, medical, social and family history and have updated as needed in the electronic medical record    Review of Systems   Constitutional: Negative for activity change, appetite change, chills, diaphoresis, fatigue, fever and unexpected weight change. HENT: Positive for postnasal drip and tinnitus ( right ear). Negative for congestion, dental problem, drooling, ear discharge, ear pain, facial swelling, hearing loss, mouth sores, nosebleeds, rhinorrhea, sinus pressure, sinus pain, sneezing, sore throat, trouble swallowing and voice change.     Eyes: Negative for visual disturbance. Respiratory: Positive for cough. Negative for chest tightness, shortness of breath and wheezing. Cardiovascular: Negative for chest pain, palpitations and leg swelling. Gastrointestinal: Negative for abdominal pain, constipation, diarrhea, nausea and vomiting. Endocrine: Negative for cold intolerance, heat intolerance, polydipsia, polyphagia and polyuria. Genitourinary: Negative for difficulty urinating, frequency and urgency. Musculoskeletal: Negative for arthralgias, back pain, gait problem, joint swelling, myalgias, neck pain and neck stiffness. Skin: Negative for color change, pallor, rash and wound. Allergic/Immunologic: Positive for environmental allergies. Negative for food allergies and immunocompromised state. Neurological: Negative for dizziness, tremors, seizures, syncope, facial asymmetry, speech difficulty, weakness, light-headedness, numbness and headaches. Hematological: Negative for adenopathy. Does not bruise/bleed easily. Psychiatric/Behavioral: Negative for agitation, behavioral problems, confusion, decreased concentration, dysphoric mood, hallucinations, self-injury, sleep disturbance and suicidal ideas. The patient is not nervous/anxious and is not hyperactive.         OBJECTIVE:     VS:  Wt Readings from Last 3 Encounters:   12/17/21 190 lb 8 oz (86.4 kg)   11/12/21 186 lb (84.4 kg)   09/27/21 186 lb 6.4 oz (84.6 kg)                       Vitals:    12/17/21 0956   BP: 126/74   Pulse: 58   Resp: 18   Temp: 97 °F (36.1 °C)   TempSrc: Temporal   SpO2: 99%   Weight: 190 lb 8 oz (86.4 kg)   Height: 5' 6\" (1.676 m)       General: Alert and oriented to person, place, and time, well developed and well nourished, in no acute distress  SKIN: Warm and dry, intact without any rash, masses or lesions  HEAD: normocephalic, atraumatic  Eyes: sclera/conjunctiva clear, PERRLA, EOMI's intact  ENT: cerumen impaction on right attempted removal but very hard, left ear is clear tympanic membranes, external ear and ear canal normal bilaterally, normal hearing,  Neck: supple and non-tender without mass, trachea midline, no cervical lymphadenopathy, no bruit, no thyromegaly or nodules  Cardiovascular: regular rate and regular rhythm, normal S1 and S2,  no murmurs, rubs, clicks, or gallop. Distal pulses intact, no carotid bruits. No edema  Pulmonary/Chest: clear to auscultation bilaterally, no wheezes, rales or rhonchi, normal air movement, no respiratory distress  Abdomen: soft, non-tender, non-distended, normal bowel sounds, no masses or hepatosplenomegaly  Neurologic: gait, coordination and speech normal  Extremities: no clubbing, cyanosis, or edema. Psychiatric: Good eye contact, normal mood and affect, answers questions appropriately    I have reviewed my findings and recommendations with Mark Anthony Byrd.     Ryne Cobos, APRN - CNP, NP-C, FNP-BC

## 2021-12-20 ENCOUNTER — TELEPHONE (OUTPATIENT)
Dept: FAMILY MEDICINE CLINIC | Age: 61
End: 2021-12-20

## 2022-02-25 ENCOUNTER — OFFICE VISIT (OUTPATIENT)
Dept: FAMILY MEDICINE CLINIC | Age: 62
End: 2022-02-25
Payer: COMMERCIAL

## 2022-02-25 ENCOUNTER — TELEPHONE (OUTPATIENT)
Dept: FAMILY MEDICINE CLINIC | Age: 62
End: 2022-02-25

## 2022-02-25 VITALS
OXYGEN SATURATION: 98 % | SYSTOLIC BLOOD PRESSURE: 136 MMHG | HEIGHT: 66 IN | TEMPERATURE: 97.3 F | BODY MASS INDEX: 31.34 KG/M2 | HEART RATE: 74 BPM | DIASTOLIC BLOOD PRESSURE: 79 MMHG | RESPIRATION RATE: 17 BRPM | WEIGHT: 195 LBS

## 2022-02-25 DIAGNOSIS — R35.0 URINARY FREQUENCY: ICD-10-CM

## 2022-02-25 LAB
BILIRUBIN, POC: NORMAL
BLOOD URINE, POC: NORMAL
CLARITY, POC: CLEAR
COLOR, POC: YELLOW
GLUCOSE URINE, POC: NORMAL
KETONES, POC: NORMAL
LEUKOCYTE EST, POC: NORMAL
NITRITE, POC: NORMAL
PH, POC: 7
PROTEIN, POC: NORMAL
SPECIFIC GRAVITY, POC: 1.01
UROBILINOGEN, POC: 0.2

## 2022-02-25 PROCEDURE — G8427 DOCREV CUR MEDS BY ELIG CLIN: HCPCS | Performed by: NURSE PRACTITIONER

## 2022-02-25 PROCEDURE — 3017F COLORECTAL CA SCREEN DOC REV: CPT | Performed by: NURSE PRACTITIONER

## 2022-02-25 PROCEDURE — 99213 OFFICE O/P EST LOW 20 MIN: CPT | Performed by: NURSE PRACTITIONER

## 2022-02-25 PROCEDURE — G8484 FLU IMMUNIZE NO ADMIN: HCPCS | Performed by: NURSE PRACTITIONER

## 2022-02-25 PROCEDURE — 81002 URINALYSIS NONAUTO W/O SCOPE: CPT | Performed by: NURSE PRACTITIONER

## 2022-02-25 PROCEDURE — G8417 CALC BMI ABV UP PARAM F/U: HCPCS | Performed by: NURSE PRACTITIONER

## 2022-02-25 PROCEDURE — 1036F TOBACCO NON-USER: CPT | Performed by: NURSE PRACTITIONER

## 2022-02-25 RX ORDER — NITROFURANTOIN 25; 75 MG/1; MG/1
100 CAPSULE ORAL 2 TIMES DAILY
Qty: 6 CAPSULE | Refills: 0 | Status: SHIPPED | OUTPATIENT
Start: 2022-02-25 | End: 2022-02-28

## 2022-02-25 ASSESSMENT — PATIENT HEALTH QUESTIONNAIRE - PHQ9
SUM OF ALL RESPONSES TO PHQ QUESTIONS 1-9: 0
2. FEELING DOWN, DEPRESSED OR HOPELESS: 0
SUM OF ALL RESPONSES TO PHQ QUESTIONS 1-9: 0
SUM OF ALL RESPONSES TO PHQ9 QUESTIONS 1 & 2: 0
1. LITTLE INTEREST OR PLEASURE IN DOING THINGS: 0

## 2022-02-25 NOTE — PROGRESS NOTES
Chief Complaint       Cystitis (pressure and pain , frequency and urgency started yesterday )    History of Present Illness   Source of history provided by:  patient. Kal Travis is a 64 y.o. old male presenting to the walk in clinic for evaluation of dysuria x 1-2 days. Reports associated frequency, urgency, and suprapubic pressure. Minor relief following voiding & pain returns. Denies gross hematuria. Denies associated flank pain. Denies any fever, chills, vomiting, diarrhea, or lethargy.  this AM, reports a little higher since COVID over lj. Drinks approimately 32 oz of water daily. Hx of enlarged prostrate. Denies change in bowels or diet. Moves bowels daily. ROS    Unless otherwise stated in this report or unable to obtain because of the patient's clinical or mental status as evidenced by the medical record, this patients's positive and negative responses for Review of Systems, constitutional, psych, eyes, ENT, cardiovascular, respiratory, gastrointestinal, neurological, genitourinary, musculoskeletal, integument systems and systems related to the presenting problem are either stated in the preceding or were not pertinent or were negative for the symptoms and/or complaints related to the medical problem. Past Medical History:  has a past medical history of Back skin lesion, Cervical radiculopathy, Corns and callus, Degeneration, intervertebral disc, cervical, Diabetes mellitus ([address] Headache(784.0), Hyperlipidemia, Hypertension, Low back pain radiating to left lower extremity, Peptic ulcer, Pressure in left side of chest, Spinal stenosis, and Type 2 diabetes mellitus without complication, without long-term current use of insulin ([address] Past Surgical History:  has a past surgical history that includes Cholecystectomy, laparoscopic (2010); lymph node biopsy; back surgery; Anus surgery (Feb 2013); Nerve Block (Left, 12/14/2020);  Nerve Block (Left, 02/01/2021); and Nerve Block (Left, 2/1/2021). Social History:  reports that he quit smoking about 20 years ago. He has a 37.50 pack-year smoking history. He has never used smokeless tobacco. He reports current alcohol use. He reports that he does not use drugs. Family History: family history includes Cancer in his father; Heart Disease in his brother and father; Heart Surgery in his father; High Cholesterol in his brother and sister; Hypertension in his father and mother. Allergies: Benadryl [diphenhydramine] and Lopid [gemfibrozil]    Physical Exam         VS:  /79   Pulse 74   Temp 97.3 °F (36.3 °C) (Temporal)   Resp 17   Ht 5' 6\" (1.676 m)   Wt 195 lb (88.5 kg)   SpO2 98%   BMI 31.47 kg/m²    Oxygen Saturation Interpretation: Normal.    Constitutional:  A&Ox3, development consistent with age, NAD. Lungs:  CTAB without wheezing, rales, or rhonchi. Heart:  RRR without pathologic murmurs, rubs, or gallops. Abdomen: Soft, nondistended, with mild suprapubic tenderness. No rebound, rigidity, or guarding. BS+ X4. No organomegaly. Back: No CVA tenderness. Skin:  Normal turgor. Warm, dry, without visible rash, unless noted elsewhere. Neurological:  Alert and oriented. Motor functions intact. Responds to verbal commands. Lab / Imaging Results   (All laboratory and radiology results have been personally reviewed by myself)  Labs:  No results found for this visit on 02/25/22. Imaging: All Radiology results interpreted by Radiologist unless otherwise noted. Assessment / Plan     Impression(s):  Lizzette Woodard was seen today for cystitis. Diagnoses and all orders for this visit:    Urinary frequency  -     POCT Urinalysis no Micro  -     Culture, Urine; Future  -     nitrofurantoin, macrocrystal-monohydrate, (MACROBID) 100 MG capsule; Take 1 capsule by mouth 2 times daily for 3 days      Disposition:  Disposition: Discharge to home. UA appears negative for a UTI, but will treat based upon symptoms today.  Urine C&S pending, will call with results once available. Script written, side effects discussed. Increase fluids and rest. F/u PCP in 3-5 days if symptoms persist. ED sooner if symptoms worsen or change. ED immediately with the development of fever, shaking chills, body aches, flank pain, vomiting, CP, or SOB. Pt is in agreement with this care plan. All questions answered. Jose Maurice, APRN - CNP    **This report was transcribed using voice recognition software. Every effort was made to ensure accuracy; however, inadvertent computerized transcription errors may be present.

## 2022-02-25 NOTE — TELEPHONE ENCOUNTER
Patient called stating he began having pain in his bladder area yesterday. Patient denies any difficulty urinating. Informed patient that [facility] is not in the ofc this afternoon and advised to go to the [facility]. Patient was agreeable.

## 2022-02-27 LAB — URINE CULTURE, ROUTINE: NORMAL

## 2022-03-04 ENCOUNTER — OFFICE VISIT (OUTPATIENT)
Dept: FAMILY MEDICINE CLINIC | Age: 62
End: 2022-03-04
Payer: COMMERCIAL

## 2022-03-04 VITALS
HEIGHT: 66 IN | TEMPERATURE: 97.7 F | SYSTOLIC BLOOD PRESSURE: 136 MMHG | HEART RATE: 65 BPM | OXYGEN SATURATION: 97 % | DIASTOLIC BLOOD PRESSURE: 82 MMHG | BODY MASS INDEX: 31.61 KG/M2 | RESPIRATION RATE: 18 BRPM | WEIGHT: 196.7 LBS

## 2022-03-04 DIAGNOSIS — N40.1 BENIGN PROSTATIC HYPERPLASIA WITH WEAK URINARY STREAM: Primary | ICD-10-CM

## 2022-03-04 DIAGNOSIS — R39.12 BENIGN PROSTATIC HYPERPLASIA WITH WEAK URINARY STREAM: Primary | ICD-10-CM

## 2022-03-04 PROBLEM — N13.8 BPH WITH OBSTRUCTION/LOWER URINARY TRACT SYMPTOMS: Status: ACTIVE | Noted: 2022-03-04

## 2022-03-04 PROCEDURE — G8484 FLU IMMUNIZE NO ADMIN: HCPCS | Performed by: NURSE PRACTITIONER

## 2022-03-04 PROCEDURE — G8427 DOCREV CUR MEDS BY ELIG CLIN: HCPCS | Performed by: NURSE PRACTITIONER

## 2022-03-04 PROCEDURE — 1036F TOBACCO NON-USER: CPT | Performed by: NURSE PRACTITIONER

## 2022-03-04 PROCEDURE — 99214 OFFICE O/P EST MOD 30 MIN: CPT | Performed by: NURSE PRACTITIONER

## 2022-03-04 PROCEDURE — G8417 CALC BMI ABV UP PARAM F/U: HCPCS | Performed by: NURSE PRACTITIONER

## 2022-03-04 PROCEDURE — 3017F COLORECTAL CA SCREEN DOC REV: CPT | Performed by: NURSE PRACTITIONER

## 2022-03-04 RX ORDER — TAMSULOSIN HYDROCHLORIDE 0.4 MG/1
0.4 CAPSULE ORAL DAILY
Qty: 30 CAPSULE | Refills: 5 | Status: SHIPPED
Start: 2022-03-04 | End: 2022-06-02 | Stop reason: SDUPTHER

## 2022-03-04 ASSESSMENT — ENCOUNTER SYMPTOMS
WHEEZING: 0
SHORTNESS OF BREATH: 0
COUGH: 0

## 2022-03-04 NOTE — PROGRESS NOTES
OFFICE PROGRESS NOTE  [address] [place] [address] Rd  [phone]  Dept: [phone]   Chief Complaint   Patient presents with    Follow-up     Walk in care. ASSESSMENT/PLAN   1. Benign prostatic hyperplasia with weak urinary stream  Assessment & Plan:   Discussed BPH and starting medication again and he agrees. RX flomax 0.4 mg at bedtime discussed potential for hypotension when first starting. IF not improving will refer to Urology  Orders:  -     tamsulosin (FLOMAX) 0.4 MG capsule; Take 1 capsule by mouth daily, Disp-30 capsule, R-5Normal       Reviewed labs: urine culture 2/25/22  Reviewed notes from [facility] 2/25/22      Discussed taking medications as directed and adverse effects    Return in about 3 months (around 6/4/2022) for DM, HTN, BPH.     HPI:   Here today for follow up from Walk in Care. He was sen on 2/25/22 for dysuria. Started on Macrobid 100 mg BID x 3 days. Urine culture no growth. He is feeling better today. Denies any fever or chills, no flank pain. No hx. kidney stone in the past. He has noticed in the last few days his urinary stream isn't as strong. He does have some hesitancy and dribbles at the end of urination. Years ago he was on Flomax for BPH.      Current Outpatient Medications:     tamsulosin (FLOMAX) 0.4 MG capsule, Take 1 capsule by mouth daily, Disp: 30 capsule, Rfl: 5    metFORMIN (GLUCOPHAGE-XR) 500 MG extended release tablet, Take 1 tablet by mouth 2 times daily (with meals), Disp: 180 tablet, Rfl: 1    lisinopril (PRINIVIL;ZESTRIL) 10 MG tablet, Take 1 tablet by mouth daily, Disp: 90 tablet, Rfl: 1    Dulaglutide 1.5 MG/0.5ML SOPN, Inject 1.5 mg into the skin once a week, Disp: 4 pen, Rfl: 6    atorvastatin (LIPITOR) 40 MG tablet, Take 1 tablet by mouth daily, Disp: 90 tablet, Rfl: 1    atenolol (TENORMIN) 25 MG tablet, TAKE ONE TABLET BY MOUTH ONCE A DAY., Disp: 90 tablet, Rfl: 1    empagliflozin (JARDIANCE) 25 MG tablet, Take 25 mg by mouth daily, Disp: 30 tablet, Rfl: 6    nitroGLYCERIN (NITROSTAT) 0.4 MG SL tablet, DISSOLVE 1 TABLET UNDER THE TONGUE AS NEEDED, Disp: , Rfl:     blood glucose monitor strips, Test 1 times a day & as needed for symptoms of irregular blood glucose., Disp: 100 strip, Rfl: 3    Lancets MISC, 1 each by Does not apply route daily, Disp: 100 each, Rfl: 5    hydrocortisone 2.5 % cream, Apply topically 2 times daily to affected areas of skin., Disp: 28 g, Rfl: 1    Blood Glucose Monitoring Suppl (ACURA BLOOD GLUCOSE METER) w/Device KIT, 1 Device by Does not apply route daily, Disp: 1 kit, Rfl: 0    fluticasone (FLONASE) 50 MCG/ACT nasal spray, 1 spray by Nasal route daily, Disp: 3 Bottle, Rfl: 3    Multiple Vitamin (MULTI-VITAMIN) TABS, Take 1 tablet by mouth Daily. , Disp: , Rfl:       Surgical History:  has a past surgical history that includes Cholecystectomy, laparoscopic (2010); lymph node biopsy; back surgery; Anus surgery (Feb 2013); Nerve Block (Left, 12/14/2020); Nerve Block (Left, 02/01/2021); and Nerve Block (Left, 2/1/2021). Social History:  reports that he quit smoking about 20 years ago. He has a 37.50 pack-year smoking history. He has never used smokeless tobacco. He reports current alcohol use. He reports that he does not use drugs. Family History: family history includes Cancer in his father; Heart Disease in his brother and father; Heart Surgery in his father; High Cholesterol in his brother and sister; Hypertension in his father and mother. I have reviewed Rohan's allergies, medications, problem list, medical, social and family history and have updated as needed in the electronic medical record    Review of Systems   Constitutional: Negative for activity change, appetite change, chills, diaphoresis, fatigue, fever and unexpected weight change. Respiratory: Negative for cough, shortness of breath and wheezing.     Cardiovascular: Negative for chest pain, palpitations and leg swelling. Genitourinary: Positive for difficulty urinating. Negative for frequency, scrotal swelling, testicular pain and urgency. OBJECTIVE:     VS:  Wt Readings from Last 3 Encounters:   03/04/22 196 lb 11.2 oz (89.2 kg)   02/25/22 195 lb (88.5 kg)   12/17/21 190 lb 8 oz (86.4 kg)                       Vitals:    03/04/22 1017   BP: 136/82   Pulse: 65   Resp: 18   Temp: 97.7 °F (36.5 °C)   SpO2: 97%   Weight: 196 lb 11.2 oz (89.2 kg)   Height: 5' 6\" (1.676 m)       General: Alert and oriented to person, place, and time, well developed and well nourished, in no acute distress  SKIN: Warm and dry, intact without any rash, masses or lesions  Neck: supple and non-tender without mass, trachea midline, no cervical lymphadenopathy, no bruit, no thyromegaly or nodules  Cardiovascular: regular rate and regular rhythm, normal S1 and S2,  no murmurs, rubs, clicks, or gallop. Distal pulses intact, no carotid bruits. No edema  Pulmonary/Chest: clear to auscultation bilaterally, no wheezes, rales or rhonchi, normal air movement, no respiratory distress  Abdomen: soft, non-tender, non-distended, normal bowel sounds, no masses or hepatosplenomegaly  Neurologic:  gait, coordination and speech normal  Extremities: no clubbing, cyanosis, or edema. Psychiatric: Good eye contact, normal mood and affect, answers questions appropriately    I have reviewed my findings and recommendations with Wilfrido Goode.     Justin Leonardo, SUZIE - CNP, NP-C, FNP-BC

## 2022-03-04 NOTE — ASSESSMENT & PLAN NOTE
Discussed BPH and starting medication again and he agrees. RX flomax 0.4 mg at bedtime discussed potential for hypotension when first starting.  IF not improving will refer to Urology

## 2022-03-04 NOTE — PATIENT INSTRUCTIONS
The medication list included in this document is our record of what you are currently taking, including any changes that were made at today's visit.  If you find any differences when compared to your medications at home, or have any questions that were not answered at your visit, please contact the office. Patient Education        Benign Prostatic Hyperplasia: Care Instructions  Your Care Instructions     Benign prostatic hyperplasia, or BPH, is an enlarged prostate gland. The prostate is a small gland that makes some of the fluid in semen. Prostate enlargement happens to almost all men as they age. It is usually not serious. BPH does not cause prostate cancer. As the prostate gets bigger, it may partly block the flow of urine. You may have a hard time getting a urine stream started or completely stopped. You may have a weak urine stream, or you may have to urinate more often than you used to, especially at night. Most men find these problems easy to manage. You do not need treatment unless your symptoms bother you a lot or you have other problems, such as bladder infections or stones. In these cases, medicines may help. Surgery is not needed unless the urine flow is blocked or the symptoms do not get better with medicine. Follow-up care is a key part of your treatment and safety. Be sure to make and go to all appointments, and call your doctor if you are having problems. It's also a good idea to know your test results and keep a list of the medicines you take. How can you care for yourself at home? · Urinate as much as you can, relax for a few moments, and then try to urinate again. · Sit on the toilet to urinate. · Avoid caffeine and alcohol. These drinks will increase how often you need to urinate. · Many over-the-counter cold and allergy medicines can make the symptoms of BPH worse. Avoid antihistamines, decongestants, and allergy pills, if you can. Read the warnings on the package.   · If you take any prescription medicines such as muscle relaxants, pain medicines, or medicines for depression or anxiety, ask your doctor or pharmacist if they can cause urination problems. When should you call for help? Call your doctor now or seek immediate medical care if:    · You cannot urinate at all.     · You have symptoms of a urinary infection. For example:  ? You have blood or pus in your urine. ? You have pain in your back just below your rib cage. This is called flank pain. ? You have a fever, chills, or body aches. ? It hurts to urinate. ? You have groin or belly pain. Watch closely for changes in your health, and be sure to contact your doctor if:    · It hurts when you ejaculate.     · Your urinary problems get a lot worse or bother you a lot. Where can you learn more? Go to https://[email or web address].[email or web address]. org and sign in to your [employer] account. Enter B502 in the Fuz[employer] box to learn more about \"Benign Prostatic Hyperplasia: Care Instructions. \"     If you do not have an account, please click on the \"Sign Up Now\" link. Current as of: February 10, 2021               Content Version: 13.1  © 5822-3366 [employer]. Care instructions adapted under license by [facility] ([facility]). If you have questions about a medical condition or this instruction, always ask your healthcare professional. [address]  any warranty or liability for your use of this information. Patient Education        tamsulosin  Pronunciation:  chavis jeevan ALMA sin  Brand:  Flomax  What is the most important information I should know about tamsulosin? Follow all directions on your medicine label and package. Tell each of your healthcare providers about all your medical conditions, allergies, and all medicines you use. What is tamsulosin? Tamsulosin is an alpha-blocker that is used to improve urination in men with benign prostatic hyperplasia (enlarged prostate).   Tamsulosin is not approved for use in women or children. Tamsulosin may also be used for purposes not listed in this medication guide. What should I discuss with my healthcare provider before taking tamsulosin? You should not use tamsulosin if you are allergic to it. Tell your doctor if you have ever had:  · liver or kidney disease;  · prostate cancer;  · low blood pressure; or  · an allergy to sulfa drugs. Tamsulosin can affect your pupils. If you have cataract surgery, tell your surgeon ahead of time that you use this medicine. Tamsulosin is not for use in women, and the effects of this medicine during pregnancy or in breastfeeding women are unknown. How should I take tamsulosin? Your doctor may test your prostate specific antigen (PSA) to check for prostate cancer before you take tamsulosin. Follow all directions on your prescription label and read all medication guides or instruction sheets. Your doctor may occasionally change your dose. Use the medicine exactly as directed. Tamsulosin is usually taken once a day, approximately 30 minutes after the same meal each day. Swallow the capsule whole and do not crush, chew, break, or open it. Your blood pressure will need to be checked often. Some things can cause your blood pressure to get too low. This includes vomiting, diarrhea, or heavy sweating. Call your doctor if you are sick with vomiting or diarrhea. Store at room temperature away from moisture and heat. If you stop taking tamsulosin for any reason, call your doctor before you start taking it again. You may need a dose adjustment. What happens if I miss a dose? Take the medicine as soon as you can, but skip the missed dose if it is almost time for your next dose. Do not take two doses at one time. If you miss your doses for several days in a row, talk with your doctor before restarting the medication. What happens if I overdose?   Seek emergency medical attention or call the Poison Help line at 1-208.288.8930. What should I avoid while taking tamsulosin? Avoid driving or hazardous activity until you know how this medicine will affect you. Your reactions could be impaired. Avoid getting up too fast from a sitting or lying position, or you may feel dizzy. What are the possible side effects of tamsulosin? Get emergency medical help if you have signs of an allergic reaction (hives, difficult breathing, swelling in your face or throat) or a severe skin reaction (fever, sore throat, burning eyes, skin pain, red or purple skin rash with blistering and peeling). Stop using tamsulosin and call your doctor at once if you have:  · a light-headed feeling, like you might pass out; or  · penis erection that is painful or lasts 4 hours or longer. Tamsulosin lowers blood pressure and may cause dizziness or fainting, especially when you first start taking it. You may feel very dizzy when you first wake up. Avoid getting up too fast from a sitting or lying position, or you may feel dizzy. Common side effects may include:  · abnormal ejaculation, decreased amount of semen;  · dizziness, drowsiness, weakness;  · runny nose, cough;  · back pain, chest pain;  · nausea, diarrhea;  · tooth problems;  · blurred vision;  · sleep problems (insomnia); or  · decreased interest in sex. This is not a complete list of side effects and others may occur. Call your doctor for medical advice about side effects. You may report side effects to FDA at 5-494-KCI-2958. What other drugs will affect tamsulosin? Tell your doctor about all your current medicines. Many drugs can increase your risk of very low blood pressure while taking tamsulosin, especially:  · medicines similar to tamsulosin (alfuzosin, doxazosin, prazosin, silodosin, or terazosin);  · heart or blood pressure medication; or  · sildenafil (Viagra) and other erectile dysfunction medicines. This list is not complete and many other drugs may affect tamsulosin.  This includes prescription and over-the-counter medicines, vitamins, and herbal products. Not all possible drug interactions are listed here. Where can I get more information? Your pharmacist can provide more information about tamsulosin. Remember, keep this and all other medicines out of the reach of children, never share your medicines with others, and use this medication only for the indication prescribed. Every effort has been made to ensure that the information provided by [address] is accurate, up-to-date, and complete, but no guarantee is made to that effect. Drug information contained herein may be time sensitive. [facility] information has been compiled for use by healthcare practitioners and consumers in the [facility] and therefore [facility] does not warrant that uses outside of the [facility] are appropriate, unless specifically indicated otherwise. [facility]'s drug information does not endorse drugs, diagnose patients or recommend therapy. [facility]'s drug information is an informational resource designed to assist licensed healthcare practitioners in caring for their patients and/or to serve consumers viewing this service as a supplement to, and not a substitute for, the expertise, skill, knowledge and judgment of healthcare practitioners. The absence of a warning for a given drug or drug combination in no way should be construed to indicate that the drug or drug combination is safe, effective or appropriate for any given patient. [facility] does not assume any responsibility for any aspect of healthcare administered with the aid of information [facility] provides. The information contained herein is not intended to cover all possible uses, directions, precautions, warnings, drug interactions, allergic reactions, or adverse effects. If you have questions about the drugs you are taking, check with your doctor, nurse or pharmacist.  Copyright 0261-0366 Gabriel [address] [place] [address] Avenue: 9.02.  Revision date: 5/16/2019. Care instructions adapted under license by [facility] ([facility]). If you have questions about a medical condition or this instruction, always ask your healthcare professional. [address] any warranty or liability for your use of this information.

## 2022-03-11 DIAGNOSIS — Z12.5 SCREENING PSA (PROSTATE SPECIFIC ANTIGEN): ICD-10-CM

## 2022-03-11 LAB — PROSTATE SPECIFIC ANTIGEN: 0.33 NG/ML (ref 0–4)

## 2022-03-31 DIAGNOSIS — E11.9 TYPE 2 DIABETES MELLITUS WITHOUT COMPLICATION, WITHOUT LONG-TERM CURRENT USE OF INSULIN (HCC): ICD-10-CM

## 2022-03-31 RX ORDER — GLUCOSAM/CHON-MSM1/C/MANG/BOSW 500-416.6
TABLET ORAL
Refills: 0 | OUTPATIENT
Start: 2022-03-31

## 2022-04-22 ENCOUNTER — TELEPHONE (OUTPATIENT)
Dept: FAMILY MEDICINE CLINIC | Age: 62
End: 2022-04-22

## 2022-04-22 ENCOUNTER — OFFICE VISIT (OUTPATIENT)
Dept: FAMILY MEDICINE CLINIC | Age: 62
End: 2022-04-22
Payer: COMMERCIAL

## 2022-04-22 VITALS
OXYGEN SATURATION: 99 % | BODY MASS INDEX: 31.5 KG/M2 | HEIGHT: 66 IN | DIASTOLIC BLOOD PRESSURE: 82 MMHG | RESPIRATION RATE: 17 BRPM | WEIGHT: 196 LBS | SYSTOLIC BLOOD PRESSURE: 127 MMHG | HEART RATE: 72 BPM | TEMPERATURE: 97.4 F

## 2022-04-22 DIAGNOSIS — I10 ESSENTIAL HYPERTENSION: ICD-10-CM

## 2022-04-22 DIAGNOSIS — H61.21 IMPACTED CERUMEN OF RIGHT EAR: ICD-10-CM

## 2022-04-22 DIAGNOSIS — J01.20 ACUTE NON-RECURRENT ETHMOIDAL SINUSITIS: Primary | ICD-10-CM

## 2022-04-22 PROCEDURE — 3017F COLORECTAL CA SCREEN DOC REV: CPT | Performed by: NURSE PRACTITIONER

## 2022-04-22 PROCEDURE — G8417 CALC BMI ABV UP PARAM F/U: HCPCS | Performed by: NURSE PRACTITIONER

## 2022-04-22 PROCEDURE — 1036F TOBACCO NON-USER: CPT | Performed by: NURSE PRACTITIONER

## 2022-04-22 PROCEDURE — G8427 DOCREV CUR MEDS BY ELIG CLIN: HCPCS | Performed by: NURSE PRACTITIONER

## 2022-04-22 PROCEDURE — 69210 REMOVE IMPACTED EAR WAX UNI: CPT | Performed by: NURSE PRACTITIONER

## 2022-04-22 PROCEDURE — 99213 OFFICE O/P EST LOW 20 MIN: CPT | Performed by: NURSE PRACTITIONER

## 2022-04-22 RX ORDER — AZITHROMYCIN 250 MG/1
250 TABLET, FILM COATED ORAL SEE ADMIN INSTRUCTIONS
Qty: 6 TABLET | Refills: 0 | Status: SHIPPED | OUTPATIENT
Start: 2022-04-22 | End: 2022-04-27

## 2022-04-22 RX ORDER — LISINOPRIL 10 MG/1
10 TABLET ORAL 2 TIMES DAILY
Qty: 180 TABLET | Refills: 0 | Status: SHIPPED
Start: 2022-04-22 | End: 2022-06-02 | Stop reason: SDUPTHER

## 2022-04-22 NOTE — TELEPHONE ENCOUNTER
Patient was seen in the walk in clinic today for sinus issues. While here, he states that he was having issues with his blood pressure and Dr. Brandt told him to take his Lisinopril 10 mg twice daily and it has normalized his blood pressure. He will run out of Lisinopril early with the increased dosing. He wanted your opinion about the medication increase and asks if you would be willing to refill it at the new dose.    Last Appointment:  3/4/2022  Future Appointments   Date Time Provider Yoselyn Barry   6/2/2022  7:20 AM Kelle Thompson, APRN - CNP [facility]

## 2022-04-22 NOTE — TELEPHONE ENCOUNTER
I sent in the RX for twice a day but he needs to monitor his blood pressure this is the first I have heard he was having problems.

## 2022-04-22 NOTE — PROGRESS NOTES
Chief Complaint       Sinus Problem (sinus congestion and drainage , headache, facia; pain , coughing , slight chest congestion started Monday )    History of Present Illness   Source of history provided by:  patient. Shlomo Dorsey is a 58 y.o. old male presenting to the walk in clinic for evaluation of above symptoms, for x days. Denies any diarrhea, nausea CP, dyspnea, LE edema, abdominal pain, vomiting, rash, or lethargy. Denies hx of asthma or COPD; denies tobacco use. Patient denies recent sick exposures. Patient has been vaccinated for COVID-19. Patient has been taking Sudafed OTC for symptomatic relief. Able to eat & drink. ROS    Unless otherwise stated in this report or unable to obtain because of the patient's clinical or mental status as evidenced by the medical record, this patients's positive and negative responses for Review of Systems, constitutional, psych, eyes, ENT, cardiovascular, respiratory, gastrointestinal, neurological, genitourinary, musculoskeletal, integument systems and systems related to the presenting problem are either stated in the preceding or were not pertinent or were negative for the symptoms and/or complaints related to the medical problem. Past Medical History:  has a past medical history of Back skin lesion, Cervical radiculopathy, Corns and callus, Degeneration, intervertebral disc, cervical, Diabetes mellitus ([address] Headache(784.0), Hyperlipidemia, Hypertension, Low back pain radiating to left lower extremity, Peptic ulcer, Pressure in left side of chest, Spinal stenosis, and Type 2 diabetes mellitus without complication, without long-term current use of insulin ([address] Past Surgical History:  has a past surgical history that includes Cholecystectomy, laparoscopic (2010); lymph node biopsy; back surgery; Anus surgery (Feb 2013); Nerve Block (Left, 12/14/2020); Nerve Block (Left, 02/01/2021); and Nerve Block (Left, 2/1/2021).   Social History:  reports that he quit smoking about 20 years ago. He has a 37.50 pack-year smoking history. He has never used smokeless tobacco. He reports current alcohol use. He reports that he does not use drugs. Family History: family history includes Cancer in his father; Heart Disease in his brother and father; Heart Surgery in his father; High Cholesterol in his brother and sister; Hypertension in his father and mother. Allergies: Benadryl [diphenhydramine] and Lopid [gemfibrozil]    Physical Exam         VS:  /82 (Site: Left Upper Arm, Position: Sitting, Cuff Size: Large Adult)   Pulse 72   Temp 97.4 °F (36.3 °C) (Temporal)   Resp 17   Ht 5' 6\" (1.676 m)   Wt 196 lb (88.9 kg)   SpO2 99%   BMI 31.64 kg/m²    Oxygen Saturation Interpretation: Normal.    Constitutional:  Alert, development consistent with age. NAD. Head:  NC/NT. Airway patent. Cerumen noted. Mouth: Posterior pharynx with mild erythema and clear postnasal drip. No tonsillar hypertrophy or exudate. Neck:  Normal ROM. Supple. No anterior cervical adenopathy noted. Lungs: CTAB without wheezes, rales, or rhonchi. CV:  Regular rate and rhythm, normal heart sounds, without pathological murmurs, ectopy, gallops, or rubs. Skin:  Normal turgor. Warm, dry, without visible rash. Lymphatic: No lymphangitis or adenopathy noted. Neurological:  Oriented. Motor functions intact. Lab / Imaging Results   (All laboratory and radiology results have been personally reviewed by myself)  Labs:  No results found for this visit on 04/22/22. Imaging: All Radiology results interpreted by Radiologist unless otherwise noted. No results found for this visit on 04/22/22. Assessment / Plan     Impression(s):  Karoline Hopper was seen today for sinus problem. Diagnoses and all orders for this visit:    Acute non-recurrent ethmoidal sinusitis  -     azithromycin (ZITHROMAX) 250 MG tablet;  Take 1 tablet by mouth See Admin Instructions for 5 days 500mg on day 1 followed by 250mg on days 2 - 5    Impacted cerumen of right ear  - Ceruminosis is noted. Wax is removed by syringing and manual debridement. Copious amounts received. Pt tolerated procedure well. TM visualized with no perforation following irrigation. Instructions for home care to prevent wax buildup are given. - Red Flag items & conservative methods discussed including OTC methods & Coricidin medication  - F/u with PCP if symptoms persist    Disposition:  Disposition: Discharge to home. Advised cautionary self-quarantine at home in the interim. Increase fluids and rest. Symptomatic relief discussed including Tylenol prn pain/fever. Schedule virtual f/u with PCP in 7-10 days if symptoms persist. ED sooner if symptoms worsen or change. ED immediately with high or refractory fever, progressive SOB, dyspnea, CP, calf pain/swelling, shaking chills, vomiting, abdominal pain, lethargy, flank pain, or decreased urinary output. Pt verbalizes understanding and is in agreement with plan of care. All questions answered. Yann Shin, APRN - CNP    **This report was transcribed using voice recognition software. Every effort was made to ensure accuracy; however, inadvertent computerized transcription errors may be present.

## 2022-05-22 ENCOUNTER — HOSPITAL ENCOUNTER (EMERGENCY)
Age: 62
Discharge: LEFT BEFORE DISCHARGE | End: 2022-05-22

## 2022-05-22 VITALS
TEMPERATURE: 97.7 F | SYSTOLIC BLOOD PRESSURE: 155 MMHG | OXYGEN SATURATION: 98 % | HEART RATE: 84 BPM | RESPIRATION RATE: 20 BRPM | DIASTOLIC BLOOD PRESSURE: 70 MMHG

## 2022-05-22 ASSESSMENT — LIFESTYLE VARIABLES: HOW OFTEN DO YOU HAVE A DRINK CONTAINING ALCOHOL: NEVER

## 2022-05-23 ENCOUNTER — APPOINTMENT (OUTPATIENT)
Dept: GENERAL RADIOLOGY | Age: 62
End: 2022-05-23
Payer: COMMERCIAL

## 2022-05-23 ENCOUNTER — HOSPITAL ENCOUNTER (EMERGENCY)
Age: 62
Discharge: HOME | End: 2022-05-23
Attending: EMERGENCY MEDICINE
Payer: COMMERCIAL

## 2022-05-23 VITALS
DIASTOLIC BLOOD PRESSURE: 69 MMHG | HEART RATE: 73 BPM | SYSTOLIC BLOOD PRESSURE: 153 MMHG | RESPIRATION RATE: 18 BRPM | TEMPERATURE: 97.3 F | OXYGEN SATURATION: 97 %

## 2022-05-23 DIAGNOSIS — J02.9 SORE THROAT: Primary | ICD-10-CM

## 2022-05-23 DIAGNOSIS — R05.9 COUGH: ICD-10-CM

## 2022-05-23 LAB
B.E.: -0.8 MMOL/L (ref -3–3)
COHB: 0.7 % (ref 0–1.5)
CRITICAL: ABNORMAL
DATE ANALYZED: ABNORMAL
DATE OF COLLECTION: ABNORMAL
HCO3: 23.2 MMOL/L (ref 22–26)
HHB: 1.4 % (ref 0–5)
INFLUENZA A BY PCR: NOT DETECTED
INFLUENZA B BY PCR: NOT DETECTED
LAB: ABNORMAL
Lab: ABNORMAL
METHB: 0.4 % (ref 0–1.5)
MODE: ABNORMAL
O2 CONTENT: 20 ML/DL
O2 SATURATION: 98.6 % (ref 92–98.5)
O2HB: 97.5 % (ref 94–97)
OPERATOR ID: 514
PATIENT TEMP: 37 C
PCO2: 36.3 MMHG (ref 35–45)
PH BLOOD GAS: 7.42 (ref 7.35–7.45)
PO2: 167.7 MMHG (ref 75–100)
SARS-COV-2, NAAT: NOT DETECTED
SOURCE, BLOOD GAS: ABNORMAL
STREP GRP A PCR: NEGATIVE
THB: 14.4 G/DL (ref 11.5–16.5)
TIME ANALYZED: 1618

## 2022-05-23 PROCEDURE — 99284 EMERGENCY DEPT VISIT MOD MDM: CPT

## 2022-05-23 PROCEDURE — 82805 BLOOD GASES W/O2 SATURATION: CPT

## 2022-05-23 PROCEDURE — 6370000000 HC RX 637 (ALT 250 FOR IP): Performed by: STUDENT IN AN ORGANIZED HEALTH CARE EDUCATION/TRAINING PROGRAM

## 2022-05-23 PROCEDURE — 87880 STREP A ASSAY W/OPTIC: CPT

## 2022-05-23 PROCEDURE — 71046 X-RAY EXAM CHEST 2 VIEWS: CPT

## 2022-05-23 PROCEDURE — 87635 SARS-COV-2 COVID-19 AMP PRB: CPT

## 2022-05-23 PROCEDURE — 87502 INFLUENZA DNA AMP PROBE: CPT

## 2022-05-23 RX ORDER — ALBUTEROL SULFATE 90 UG/1
2 AEROSOL, METERED RESPIRATORY (INHALATION) 4 TIMES DAILY PRN
Qty: 18 G | Refills: 0 | Status: SHIPPED | OUTPATIENT
Start: 2022-05-23

## 2022-05-23 RX ORDER — LIDOCAINE HYDROCHLORIDE 20 MG/ML
15 SOLUTION OROPHARYNGEAL ONCE
Status: COMPLETED | OUTPATIENT
Start: 2022-05-23 | End: 2022-05-23

## 2022-05-23 RX ORDER — GUAIFENESIN/DEXTROMETHORPHAN 100-10MG/5
5 SYRUP ORAL ONCE
Status: COMPLETED | OUTPATIENT
Start: 2022-05-23 | End: 2022-05-23

## 2022-05-23 RX ORDER — METHYLPREDNISOLONE 4 MG/1
TABLET ORAL
Qty: 1 KIT | Refills: 0 | Status: SHIPPED | OUTPATIENT
Start: 2022-05-23 | End: 2022-05-29

## 2022-05-23 RX ADMIN — LIDOCAINE HYDROCHLORIDE 15 ML: 20 SOLUTION ORAL at 16:31

## 2022-05-23 RX ADMIN — GUAIFENESIN AND DEXTROMETHORPHAN 5 ML: 100; 10 SYRUP ORAL at 16:31

## 2022-05-23 ASSESSMENT — ENCOUNTER SYMPTOMS
DIARRHEA: 0
ABDOMINAL PAIN: 0
SORE THROAT: 1
VOMITING: 0
BACK PAIN: 0
RHINORRHEA: 0
NAUSEA: 0
SHORTNESS OF BREATH: 0
COUGH: 1

## 2022-05-23 NOTE — Clinical Note
Maru Tovar was seen and treated in our emergency department on 5/23/2022. He may return to work on 05/24/2022. Patient OK to return to work on 5/24/2022. If you have any questions or concerns, please don't hesitate to call.       Kinjal Tamez, DO

## 2022-05-23 NOTE — Clinical Note
Rosy Vigil was seen and treated in our emergency department on 5/23/2022. He may return to work on 05/24/2022. Patient OK to return to work on 5/24/2022. If you have any questions or concerns, please don't hesitate to call.       Tala Guzman, DO
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
## 2022-06-02 ENCOUNTER — OFFICE VISIT (OUTPATIENT)
Dept: FAMILY MEDICINE CLINIC | Age: 62
End: 2022-06-02
Payer: COMMERCIAL

## 2022-06-02 VITALS
HEART RATE: 64 BPM | TEMPERATURE: 97.7 F | HEIGHT: 66 IN | WEIGHT: 196.4 LBS | DIASTOLIC BLOOD PRESSURE: 68 MMHG | BODY MASS INDEX: 31.57 KG/M2 | SYSTOLIC BLOOD PRESSURE: 118 MMHG | RESPIRATION RATE: 16 BRPM | OXYGEN SATURATION: 98 %

## 2022-06-02 DIAGNOSIS — R39.12 BENIGN PROSTATIC HYPERPLASIA WITH WEAK URINARY STREAM: ICD-10-CM

## 2022-06-02 DIAGNOSIS — I10 ESSENTIAL HYPERTENSION: ICD-10-CM

## 2022-06-02 DIAGNOSIS — Z23 NEED FOR PNEUMOCOCCAL VACCINATION: ICD-10-CM

## 2022-06-02 DIAGNOSIS — N40.1 BENIGN PROSTATIC HYPERPLASIA WITH WEAK URINARY STREAM: ICD-10-CM

## 2022-06-02 DIAGNOSIS — E78.2 MIXED HYPERLIPIDEMIA: ICD-10-CM

## 2022-06-02 DIAGNOSIS — E11.9 TYPE 2 DIABETES MELLITUS WITHOUT COMPLICATION, WITHOUT LONG-TERM CURRENT USE OF INSULIN (HCC): Primary | ICD-10-CM

## 2022-06-02 PROBLEM — R35.0 URINARY FREQUENCY: Status: RESOLVED | Noted: 2022-02-25 | Resolved: 2022-06-02

## 2022-06-02 PROBLEM — H81.13 BPPV (BENIGN PAROXYSMAL POSITIONAL VERTIGO), BILATERAL: Status: RESOLVED | Noted: 2021-09-27 | Resolved: 2022-06-02

## 2022-06-02 LAB
CHP ED QC CHECK: ABNORMAL
GLUCOSE BLD-MCNC: 166 MG/DL
HBA1C MFR BLD: 7 %

## 2022-06-02 PROCEDURE — 2022F DILAT RTA XM EVC RTNOPTHY: CPT | Performed by: NURSE PRACTITIONER

## 2022-06-02 PROCEDURE — G8417 CALC BMI ABV UP PARAM F/U: HCPCS | Performed by: NURSE PRACTITIONER

## 2022-06-02 PROCEDURE — 82962 GLUCOSE BLOOD TEST: CPT | Performed by: NURSE PRACTITIONER

## 2022-06-02 PROCEDURE — 3051F HG A1C>EQUAL 7.0%<8.0%: CPT | Performed by: NURSE PRACTITIONER

## 2022-06-02 PROCEDURE — 99214 OFFICE O/P EST MOD 30 MIN: CPT | Performed by: NURSE PRACTITIONER

## 2022-06-02 PROCEDURE — 3017F COLORECTAL CA SCREEN DOC REV: CPT | Performed by: NURSE PRACTITIONER

## 2022-06-02 PROCEDURE — 1036F TOBACCO NON-USER: CPT | Performed by: NURSE PRACTITIONER

## 2022-06-02 PROCEDURE — 83036 HEMOGLOBIN GLYCOSYLATED A1C: CPT | Performed by: NURSE PRACTITIONER

## 2022-06-02 PROCEDURE — G8427 DOCREV CUR MEDS BY ELIG CLIN: HCPCS | Performed by: NURSE PRACTITIONER

## 2022-06-02 RX ORDER — METFORMIN HYDROCHLORIDE 500 MG/1
500 TABLET, EXTENDED RELEASE ORAL 2 TIMES DAILY WITH MEALS
Qty: 180 TABLET | Refills: 1 | Status: SHIPPED
Start: 2022-06-02 | End: 2022-10-03 | Stop reason: SDUPTHER

## 2022-06-02 RX ORDER — ATORVASTATIN CALCIUM 40 MG/1
40 TABLET, FILM COATED ORAL DAILY
Qty: 90 TABLET | Refills: 1 | Status: SHIPPED
Start: 2022-06-02 | End: 2022-10-03 | Stop reason: SDUPTHER

## 2022-06-02 RX ORDER — TAMSULOSIN HYDROCHLORIDE 0.4 MG/1
0.4 CAPSULE ORAL DAILY
Qty: 90 CAPSULE | Refills: 3 | Status: SHIPPED
Start: 2022-06-02 | End: 2022-10-03 | Stop reason: SDUPTHER

## 2022-06-02 RX ORDER — LANCETS 30 GAUGE
1 EACH MISCELLANEOUS DAILY
Qty: 100 EACH | Refills: 5 | Status: SHIPPED | OUTPATIENT
Start: 2022-06-02

## 2022-06-02 RX ORDER — GLUCOSAMINE HCL/CHONDROITIN SU 500-400 MG
CAPSULE ORAL
Qty: 100 STRIP | Refills: 3 | Status: SHIPPED | OUTPATIENT
Start: 2022-06-02

## 2022-06-02 RX ORDER — LISINOPRIL 10 MG/1
10 TABLET ORAL 2 TIMES DAILY
Qty: 180 TABLET | Refills: 0 | Status: SHIPPED
Start: 2022-06-02 | End: 2022-10-03 | Stop reason: SDUPTHER

## 2022-06-02 RX ORDER — ATENOLOL 25 MG/1
TABLET ORAL
Qty: 90 TABLET | Refills: 1 | Status: SHIPPED
Start: 2022-06-02 | End: 2022-10-03 | Stop reason: SDUPTHER

## 2022-06-02 SDOH — ECONOMIC STABILITY: FOOD INSECURITY: WITHIN THE PAST 12 MONTHS, THE FOOD YOU BOUGHT JUST DIDN'T LAST AND YOU DIDN'T HAVE MONEY TO GET MORE.: NEVER TRUE

## 2022-06-02 SDOH — ECONOMIC STABILITY: FOOD INSECURITY: WITHIN THE PAST 12 MONTHS, YOU WORRIED THAT YOUR FOOD WOULD RUN OUT BEFORE YOU GOT MONEY TO BUY MORE.: NEVER TRUE

## 2022-06-02 ASSESSMENT — ENCOUNTER SYMPTOMS
WHEEZING: 0
SINUS PAIN: 0
RHINORRHEA: 0
NAUSEA: 0
CONSTIPATION: 0
SINUS PRESSURE: 0
SHORTNESS OF BREATH: 0
TROUBLE SWALLOWING: 0
COUGH: 1
BACK PAIN: 0
SORE THROAT: 0
VOICE CHANGE: 0
CHEST TIGHTNESS: 0
ABDOMINAL PAIN: 0
COLOR CHANGE: 0
VOMITING: 0
FACIAL SWELLING: 0
DIARRHEA: 0

## 2022-06-02 ASSESSMENT — SOCIAL DETERMINANTS OF HEALTH (SDOH): HOW HARD IS IT FOR YOU TO PAY FOR THE VERY BASICS LIKE FOOD, HOUSING, MEDICAL CARE, AND HEATING?: NOT HARD AT ALL

## 2022-06-02 NOTE — ASSESSMENT & PLAN NOTE
well controlled, no significant medication side effects noted and fasting labs in 12 weeks, continue lisinopril 10 mg BID, atenolol 25 mg daily  -Discussed taking medication and directed every day. -Discussed exercising daily 30 minutes 5 times a week for 150 minutes weekly.  -Discussed weight reduction if needed.  -Discussed low sodium diet.  -Discussed limiting caffeine consumption and tobacco cessation and the effects they have on the heart and blood pressure.

## 2022-06-02 NOTE — PROGRESS NOTES
OFFICE PROGRESS NOTE  [address]  [address] [facility] [address]  Dept: [phone]   Chief Complaint   Patient presents with    Diabetes    Hypertension    Benign Prostatic Hypertrophy       ASSESSMENT/PLAN   1. Type 2 diabetes mellitus without complication, without long-term current use of insulin ([facility])  Assessment & Plan:   reasonably well controlled, no significant medication side effects noted and A1c up to 7.0% but was on steroids recently continue metformin XR BID , dulaglutied 1.5 mg weekly, jardiance 25 mg daily,   Monitor BS at different times: 1 day fasting, then next day 2 hours after lunch, next day 2 hours after dinner, next day at bedtime then start over and log all values. Bring log to next appointment  Foot exam every day; wash and dry well between toes, look for any redness, cracks, wounds notify provider if any problems occur  Reminder for annual Eye exam  Reminder for Podiatry visits Q 2 Months for toenail care if needed  Reminder to keep vaccines up dated  Exercise 30 minutes daily  Recommend Diabetic Education Classes if you have not already attended  Orders:  -     POCT glycosylated hemoglobin (Hb A1C)  -     POCT Glucose  -     metFORMIN (GLUCOPHAGE-XR) 500 mg extended release tablet; Take 1 tablet by mouth 2 times daily (with meals), Disp-180 tablet, R-1Plain metformin causes diarrhea, note increase in doseNormal  -     Dulaglutide 1.5 MG/0.5ML SOPN; Inject 1.5 mg into the skin once a week, Disp-4 pen, R-6Note increase in doseNormal  -     CBC with Auto Differential; Future  -     Comprehensive Metabolic Panel; Future  -     Lipid Panel; Future  -     empagliflozin (JARDIANCE) 25 MG tablet; Take 1 tablet by mouth daily, Disp-30 tablet, R-6Will they cover 90 day supply? Normal  -     blood glucose monitor strips;  Test 1 times a day & as needed for symptoms of irregular blood glucose., Disp-100 strip, R-3, Normal  -     Lancets MISC; DAILY Starting Thu 6/2/2022, Disp-100 each, R-5, Normal  2. Essential hypertension  Assessment & Plan:   well controlled, no significant medication side effects noted and fasting labs in 12 weeks, continue lisinopril 10 mg BID, atenolol 25 mg daily  -Discussed taking medication and directed every day. -Discussed exercising daily 30 minutes 5 times a week for 150 minutes weekly.  -Discussed weight reduction if needed.  -Discussed low sodium diet.  -Discussed limiting caffeine consumption and tobacco cessation and the effects they have on the heart and blood pressure. Orders:  -     lisinopril (PRINIVIL;ZESTRIL) 10 MG tablet; Take 1 tablet by mouth in the morning and at bedtime, Disp-180 tablet, R-0Note change in doseNormal  -     atenolol (TENORMIN) 25 MG tablet; TAKE ONE TABLET BY MOUTH ONCE A DAY., Disp-90 tablet, R-1Normal  -     CBC with Auto Differential; Future  -     Comprehensive Metabolic Panel; Future  -     Lipid Panel; Future  3. Mixed hyperlipidemia  Assessment & Plan:   well controlled, no significant medication side effects noted and fasting labs in 12 weeks, cotninue atorvastatin 40 mg nightly  -Discussed low fat diet, limit fast food, goodies, breads and pastas if consuming several days a week,  limit any alcohol consumption.  -Discussed weight reduction and exercise 30 minutes 5 days a week for total of 150 minutes weekly.  -Discussed if any unusual muscle aching/pain to contact the office, discussed medication and risk of muscle pain/damage from Rhabdomyolysis. -Discussed repeat labs in 12 weeks. Orders:  -     atorvastatin (LIPITOR) 40 MG tablet; Take 1 tablet by mouth daily, Disp-90 tablet, R-1Normal  -     CBC with Auto Differential; Future  -     Comprehensive Metabolic Panel; Future  -     Lipid Panel; Future  4. Benign prostatic hyperplasia with weak urinary stream  Assessment & Plan:   Improving since starting Flomax, recent PSA 0.33, denies any adverse effects of the medication.     Rx renewed flomax 0.4 mg daily  Orders:  -     tamsulosin (FLOMAX) 0.4 mg capsule; Take 1 capsule by mouth daily, Disp-90 capsule, R-3Normal  5. Need for pneumococcal vaccination  -     pneumococcal 20-valent conjugat (PREVNAR) 0.5 ML NANI inj; Inject 0.5 mLs into the muscle once for 1 dose, Disp-0.5 mL, R-0Print       Reviewed labs: blood gas 5/23/22  Reviewed notes from ER 5/23/22  Reviewed radiology CXR 5/23/22    Discussed weight loss, Discussed exercising 30 minutes daily and Discussed taking medications as directed and adverse effects    Return in about 4 months (around 10/2/2022) for DM, HTN, hyperlipidemia. HPI:     58 y.o. male presents today for follow-up of diabetes [place]. In-office bloodsugar is:   Results for POC orders placed in visit on 06/02/22   POCT glycosylated hemoglobin (Hb A1C)   Result Value Ref Range    Hemoglobin A1C 7.0 %   POCT Glucose   Result Value Ref Range    Glucose 166 mg/dL    QC OK? pass        Patient reports being compliant to low carbohydrate diet and increasing weekly exercises.   Currently, the patient is treated with medication(s): Jardiance 25 mg daily, Dulaglutide 1.5 mg SC weekly, metformin  mg BID.  Patient reports checking home blood sugar 1 times per day.  Patient states home blood sugar ranges from 129 - 180. Patient denies hypoglycemic episodes and understand to take sweetened beverages or a snack if hypoglycemic symptoms are suspected. Hypertension: Patient here for follow-up of elevated blood pressure. He is exercising and is adherent to low salt diet.  Blood pressure is well controlled at home. Cardiac symptoms none.  Patient denies chest pain, chest pressure/discomfort, claudication, dyspnea, exertional chest pressure/discomfort, fatigue, irregular heart beat, lower extremity edema, near-syncope, orthopnea, palpitations, paroxysmal nocturnal dyspnea, syncope and tachypnea.  Cardiovascular risk factors: advanced age (older than 54 for men, 72 for women), diabetes mellitus, dyslipidemia, hypertension, male gender and obesity (BMI >= 30 kg/m2). Use of agents associated with hypertension: none. History of target organ damage: none.      Hyperlipidemia: Patient presents with hyperlipidemia.  He was tested because DM, HTN, hyperlipidemia.  His last labs 10/8/21 showed Total cholesterol of 136, HDL 51, LDL 57,  Triglycerides 142. Denies chest pain, dyspnea, exertional chest pressure/discomfort, fatigue, feeding intolerance, lower extremity edema, palpitations, poor exercise tolerance, syncope, tachypnea and skin xanthelasma. There is a family history of hyperlipidemia. There is not a family history of early ischemia heart disease. He was in the ER on 5/23/22 after waking up on the Saturday before he was in [place] and woke up in his camper with sore throat and coughing. He presented to the ER once he returned back to [place]. All testing was negative was treated with z pack, prednisone and albuterol inhaler. He is feeling better today still a little congested. BPH is doing better on the Flomax, he is urinating better the past 2 weeks seems a lot better. Stream is stronger and not as much dribbling. Nocturia has improved. He is complaining of stiffness in his legs states he will be walking and his upper leg seems like it gets hard for a couple of minutes and he will have to sit down and then it is gone. He feels the legs get stiff and then it is hard to move until it resolves will be somewhat pain like a discomfort but not true pain. Almost feels like a cramp but isn't painful like a muscle cramp started about 6 months ago. Has occurred twice in the last 6 months. He tries not to get dehydrated but states it is possible. He does have problems with L3 - L4 but no complaints at this time.          Today's vital signs are as follows:  /68 (Site: Left Upper Arm, Position: Sitting, Cuff Size: Medium Adult)   Pulse 64   Temp 97.7 °F (36.5 °C) (Temporal) Resp 16   Ht 5' 6\" (1.676 m)   Wt 196 lb 6.4 oz (89.1 kg)   SpO2 98%   BMI 31.70 kg/m²     Lab Results   Component Value Date    LABA1C 7.0 06/02/2022                Current Outpatient Medications:     metFORMIN (GLUCOPHAGE-XR) 500 mg extended release tablet, Take 1 tablet by mouth 2 times daily (with meals), Disp: 180 tablet, Rfl: 1    lisinopril (PRINIVIL;ZESTRIL) 10 MG tablet, Take 1 tablet by mouth in the morning and at bedtime, Disp: 180 tablet, Rfl: 0    Dulaglutide 1.5 MG/0.5ML SOPN, Inject 1.5 mg into the skin once a week, Disp: 4 pen, Rfl: 6    atorvastatin (LIPITOR) 40 MG tablet, Take 1 tablet by mouth daily, Disp: 90 tablet, Rfl: 1    atenolol (TENORMIN) 25 MG tablet, TAKE ONE TABLET BY MOUTH ONCE A DAY., Disp: 90 tablet, Rfl: 1    pneumococcal 20-valent conjugat (PREVNAR) 0.5 ML NANI inj, Inject 0.5 mLs into the muscle once for 1 dose, Disp: 0.5 mL, Rfl: 0    tamsulosin (FLOMAX) 0.4 mg capsule, Take 1 capsule by mouth daily, Disp: 90 capsule, Rfl: 3    empagliflozin (JARDIANCE) 25 MG tablet, Take 1 tablet by mouth daily, Disp: 30 tablet, Rfl: 6    blood glucose monitor strips, Test 1 times a day & as needed for symptoms of irregular blood glucose., Disp: 100 strip, Rfl: 3    Lancets MISC, 1 each by Does not apply route daily, Disp: 100 each, Rfl: 5    albuterol sulfate HFA (VENTOLIN HFA) 108 (90 Base) MCG/ACT inhaler, Inhale 2 puffs into the lungs 4 times daily as needed for Wheezing, Disp: 18 g, Rfl: 0    Blood Glucose Monitoring Suppl (ACURA BLOOD GLUCOSE METER) w/Device KIT, 1 Device by Does not apply route daily, Disp: 1 kit, Rfl: 0    fluticasone (FLONASE) 50 MCG/ACT nasal spray, 1 spray by Nasal route daily, Disp: 3 Bottle, Rfl: 3    Multiple Vitamin (MULTI-VITAMIN) TABS, Take 1 tablet by mouth Daily. , Disp: , Rfl:     nitroGLYCERIN (NITROSTAT) 0.4 MG SL tablet, DISSOLVE 1 TABLET UNDER THE TONGUE AS NEEDED (Patient not taking: Reported on 6/2/2022), Disp: , Rfl:    hydrocortisone 2.5 % cream, Apply topically 2 times daily to affected areas of skin. (Patient not taking: Reported on 6/2/2022), Disp: 28 g, Rfl: 1      Surgical History:  has a past surgical history that includes Cholecystectomy, laparoscopic (2010); lymph node biopsy; back surgery; Anus surgery (Feb 2013); Nerve Block (Left, 12/14/2020); Nerve Block (Left, 02/01/2021); and Nerve Block (Left, 2/1/2021). Social History:  reports that he quit smoking about 20 years ago. He has a 37.50 pack-year smoking history. He has never used smokeless tobacco. He reports current alcohol use. He reports that he does not use drugs. Family History: family history includes Cancer in his father; Heart Disease in his brother and father; Heart Surgery in his father; High Cholesterol in his brother and sister; Hypertension in his father and mother. I have reviewed Rohan's allergies, medications, problem list, medical, social and family history and have updated as needed in the electronic medical record    Review of Systems   Constitutional: Negative for activity change, appetite change, chills, diaphoresis, fatigue, fever and unexpected weight change. HENT: Negative for congestion, dental problem, drooling, ear discharge, ear pain, facial swelling, hearing loss, mouth sores, nosebleeds, postnasal drip, rhinorrhea, sinus pressure, sinus pain, sneezing, sore throat, tinnitus, trouble swallowing and voice change. Eyes: Negative for visual disturbance. Respiratory: Positive for cough ( still from propaine exposure). Negative for chest tightness, shortness of breath and wheezing. Cardiovascular: Negative for chest pain, palpitations and leg swelling. Gastrointestinal: Negative for abdominal pain, constipation, diarrhea, nausea and vomiting. Endocrine: Negative for cold intolerance, heat intolerance, polydipsia, polyphagia and polyuria. Genitourinary: Negative for difficulty urinating, frequency and urgency. Musculoskeletal: Negative for arthralgias, back pain, gait problem, joint swelling, myalgias, neck pain and neck stiffness. Upper thigh tightness   Skin: Negative for color change, pallor, rash and wound. Allergic/Immunologic: Negative for environmental allergies, food allergies and immunocompromised state. Neurological: Negative for dizziness, tremors, seizures, syncope, facial asymmetry, speech difficulty, weakness, light-headedness, numbness and headaches. Hematological: Negative for adenopathy. Does not bruise/bleed easily. Psychiatric/Behavioral: Negative for agitation, behavioral problems, confusion, decreased concentration, dysphoric mood, hallucinations, self-injury, sleep disturbance and suicidal ideas. The patient is not nervous/anxious and is not hyperactive.         OBJECTIVE:     VS:  Wt Readings from Last 3 Encounters:   06/02/22 196 lb 6.4 oz (89.1 kg)   04/22/22 196 lb (88.9 kg)   03/04/22 196 lb 11.2 oz (89.2 kg)                       Vitals:    06/02/22 0715   BP: 118/68   Site: Left Upper Arm   Position: Sitting   Cuff Size: Medium Adult   Pulse: 64   Resp: 16   Temp: 97.7 °F (36.5 °C)   TempSrc: Temporal   SpO2: 98%   Weight: 196 lb 6.4 oz (89.1 kg)   Height: 5' 6\" (1.676 m)       General: Alert and oriented to person, place, and time, well developed and well nourished, in no acute distress  SKIN: Warm and dry, intact without any rash, masses or lesions  HEAD: normocephalic, atraumatic  Eyes: sclera/conjunctiva clear, PERRLA, EOMI's intact  ENT: tympanic membranes, external ear and ear canal normal bilaterally, normal hearing, Nose without deformity, nasal mucosa and turbinates normal without polyps   Throat: clear, tongue midline, tonsils 1+, drainage, no masses or lesions noted, good dentition  Neck: supple and non-tender without mass, trachea midline, no cervical lymphadenopathy, no bruit, no thyromegaly or nodules  Cardiovascular: regular rate and regular rhythm, normal S1 and S2,  no murmurs, rubs, clicks, or gallop. Distal pulses intact, no carotid bruits. No edema  Pulmonary/Chest: clear to auscultation bilaterally, no wheezes, rales or rhonchi, normal air movement, no respiratory distress  Abdomen: soft, non-tender, non-distended, normal bowel sounds, no masses or hepatosplenomegaly  Musculoskeletal: Normal ROM, no joint swelling, deformity or tenderness   Neurologic: reflexes normal and symmetric, no cranial nerve deficit, gait, coordination and speech normal  Extremities: no clubbing, cyanosis, or edema. Psychiatric: Good eye contact, normal mood and affect, answers questions appropriately    I have reviewed my findings and recommendations with Gianfranco Zeng.     Jag Schmidt, SUZIE - CNP, NP-C, FNP-BC

## 2022-06-02 NOTE — ASSESSMENT & PLAN NOTE
Improving since starting Flomax, recent PSA 0.33, denies any adverse effects of the medication.     Rx renewed flomax 0.4 mg daily

## 2022-06-02 NOTE — ASSESSMENT & PLAN NOTE
reasonably well controlled, no significant medication side effects noted and A1c up to 7.0% but was on steroids recently continue metformin XR BID , dulaglutied 1.5 mg weekly, jardiance 25 mg daily,   Monitor BS at different times: 1 day fasting, then next day 2 hours after lunch, next day 2 hours after dinner, next day at bedtime then start over and log all values.   Bring log to next appointment  Foot exam every day; wash and dry well between toes, look for any redness, cracks, wounds notify provider if any problems occur  Reminder for annual Eye exam  Reminder for Podiatry visits Q 2 Months for toenail care if needed  Reminder to keep vaccines up dated  Exercise 30 minutes daily  Recommend Diabetic Education Classes if you have not already attended

## 2022-06-02 NOTE — ASSESSMENT & PLAN NOTE
well controlled, no significant medication side effects noted and fasting labs in 12 weeks, cotninue atorvastatin 40 mg nightly  -Discussed low fat diet, limit fast food, goodies, breads and pastas if consuming several days a week,  limit any alcohol consumption.  -Discussed weight reduction and exercise 30 minutes 5 days a week for total of 150 minutes weekly.  -Discussed if any unusual muscle aching/pain to contact the office, discussed medication and risk of muscle pain/damage from Rhabdomyolysis. -Discussed repeat labs in 12 weeks.

## 2022-09-26 DIAGNOSIS — E78.2 MIXED HYPERLIPIDEMIA: ICD-10-CM

## 2022-09-26 DIAGNOSIS — I10 ESSENTIAL HYPERTENSION: ICD-10-CM

## 2022-09-26 DIAGNOSIS — E11.9 TYPE 2 DIABETES MELLITUS WITHOUT COMPLICATION, WITHOUT LONG-TERM CURRENT USE OF INSULIN (HCC): ICD-10-CM

## 2022-09-26 LAB
ALBUMIN SERPL-MCNC: 4.6 G/DL (ref 3.5–5.2)
ALP BLD-CCNC: 96 U/L (ref 40–129)
ALT SERPL-CCNC: 58 U/L (ref 0–40)
ANION GAP SERPL CALCULATED.3IONS-SCNC: 17 MMOL/L (ref 7–16)
AST SERPL-CCNC: 34 U/L (ref 0–39)
BASOPHILS ABSOLUTE: 0.1 E9/L (ref 0–0.2)
BASOPHILS RELATIVE PERCENT: 1.5 % (ref 0–2)
BILIRUB SERPL-MCNC: 0.5 MG/DL (ref 0–1.2)
BUN BLDV-MCNC: 15 MG/DL (ref 6–23)
CALCIUM SERPL-MCNC: 9.2 MG/DL (ref 8.6–10.2)
CHLORIDE BLD-SCNC: 101 MMOL/L (ref 98–107)
CHOLESTEROL, TOTAL: 159 MG/DL (ref 0–199)
CO2: 20 MMOL/L (ref 22–29)
CREAT SERPL-MCNC: 1.3 MG/DL (ref 0.7–1.2)
EOSINOPHILS ABSOLUTE: 0.27 E9/L (ref 0.05–0.5)
EOSINOPHILS RELATIVE PERCENT: 4 % (ref 0–6)
GFR AFRICAN AMERICAN: >60
GFR NON-AFRICAN AMERICAN: 56 ML/MIN/1.73
GLUCOSE BLD-MCNC: 141 MG/DL (ref 74–99)
HCT VFR BLD CALC: 47.4 % (ref 37–54)
HDLC SERPL-MCNC: 46 MG/DL
HEMOGLOBIN: 16.2 G/DL (ref 12.5–16.5)
IMMATURE GRANULOCYTES #: 0.02 E9/L
IMMATURE GRANULOCYTES %: 0.3 % (ref 0–5)
LDL CHOLESTEROL CALCULATED: 74 MG/DL (ref 0–99)
LYMPHOCYTES ABSOLUTE: 1.55 E9/L (ref 1.5–4)
LYMPHOCYTES RELATIVE PERCENT: 23 % (ref 20–42)
MCH RBC QN AUTO: 31.7 PG (ref 26–35)
MCHC RBC AUTO-ENTMCNC: 34.2 % (ref 32–34.5)
MCV RBC AUTO: 92.8 FL (ref 80–99.9)
MONOCYTES ABSOLUTE: 0.53 E9/L (ref 0.1–0.95)
MONOCYTES RELATIVE PERCENT: 7.9 % (ref 2–12)
NEUTROPHILS ABSOLUTE: 4.26 E9/L (ref 1.8–7.3)
NEUTROPHILS RELATIVE PERCENT: 63.3 % (ref 43–80)
PDW BLD-RTO: 12.4 FL (ref 11.5–15)
PLATELET # BLD: 212 E9/L (ref 130–450)
PMV BLD AUTO: 12.3 FL (ref 7–12)
POTASSIUM SERPL-SCNC: 4.4 MMOL/L (ref 3.5–5)
RBC # BLD: 5.11 E12/L (ref 3.8–5.8)
SODIUM BLD-SCNC: 138 MMOL/L (ref 132–146)
TOTAL PROTEIN: 7.4 G/DL (ref 6.4–8.3)
TRIGL SERPL-MCNC: 195 MG/DL (ref 0–149)
VLDLC SERPL CALC-MCNC: 39 MG/DL
WBC # BLD: 6.7 E9/L (ref 4.5–11.5)

## 2022-10-03 ENCOUNTER — OFFICE VISIT (OUTPATIENT)
Dept: FAMILY MEDICINE CLINIC | Age: 62
End: 2022-10-03
Payer: COMMERCIAL

## 2022-10-03 VITALS
TEMPERATURE: 97.3 F | WEIGHT: 197 LBS | HEART RATE: 68 BPM | SYSTOLIC BLOOD PRESSURE: 108 MMHG | RESPIRATION RATE: 16 BRPM | DIASTOLIC BLOOD PRESSURE: 78 MMHG | BODY MASS INDEX: 29.86 KG/M2 | HEIGHT: 68 IN | OXYGEN SATURATION: 99 %

## 2022-10-03 DIAGNOSIS — N18.2 TYPE 2 DIABETES MELLITUS WITH STAGE 2 CHRONIC KIDNEY DISEASE, WITHOUT LONG-TERM CURRENT USE OF INSULIN (HCC): Primary | ICD-10-CM

## 2022-10-03 DIAGNOSIS — R39.12 BENIGN PROSTATIC HYPERPLASIA WITH WEAK URINARY STREAM: ICD-10-CM

## 2022-10-03 DIAGNOSIS — E78.2 MIXED HYPERLIPIDEMIA: ICD-10-CM

## 2022-10-03 DIAGNOSIS — N40.1 BENIGN PROSTATIC HYPERPLASIA WITH WEAK URINARY STREAM: ICD-10-CM

## 2022-10-03 DIAGNOSIS — J30.89 NON-SEASONAL ALLERGIC RHINITIS DUE TO OTHER ALLERGIC TRIGGER: ICD-10-CM

## 2022-10-03 DIAGNOSIS — I10 ESSENTIAL HYPERTENSION: ICD-10-CM

## 2022-10-03 DIAGNOSIS — E11.22 TYPE 2 DIABETES MELLITUS WITH STAGE 2 CHRONIC KIDNEY DISEASE, WITHOUT LONG-TERM CURRENT USE OF INSULIN (HCC): Primary | ICD-10-CM

## 2022-10-03 DIAGNOSIS — R74.01 ELEVATED ALT MEASUREMENT: ICD-10-CM

## 2022-10-03 DIAGNOSIS — Z23 NEEDS FLU SHOT: ICD-10-CM

## 2022-10-03 PROBLEM — J30.9 ALLERGIC RHINITIS DUE TO ALLERGEN: Status: ACTIVE | Noted: 2022-10-03

## 2022-10-03 LAB
CHP ED QC CHECK: ABNORMAL
CREATININE URINE POCT: 100
GLUCOSE BLD-MCNC: 211 MG/DL
HBA1C MFR BLD: 6.8 %
MICROALBUMIN/CREAT 24H UR: 10 MG/G{CREAT}
MICROALBUMIN/CREAT UR-RTO: <30

## 2022-10-03 PROCEDURE — 99214 OFFICE O/P EST MOD 30 MIN: CPT | Performed by: NURSE PRACTITIONER

## 2022-10-03 PROCEDURE — 82044 UR ALBUMIN SEMIQUANTITATIVE: CPT | Performed by: NURSE PRACTITIONER

## 2022-10-03 PROCEDURE — 83036 HEMOGLOBIN GLYCOSYLATED A1C: CPT | Performed by: NURSE PRACTITIONER

## 2022-10-03 PROCEDURE — 90694 VACC AIIV4 NO PRSRV 0.5ML IM: CPT | Performed by: NURSE PRACTITIONER

## 2022-10-03 PROCEDURE — G8417 CALC BMI ABV UP PARAM F/U: HCPCS | Performed by: NURSE PRACTITIONER

## 2022-10-03 PROCEDURE — G8484 FLU IMMUNIZE NO ADMIN: HCPCS | Performed by: NURSE PRACTITIONER

## 2022-10-03 PROCEDURE — 3017F COLORECTAL CA SCREEN DOC REV: CPT | Performed by: NURSE PRACTITIONER

## 2022-10-03 PROCEDURE — 90471 IMMUNIZATION ADMIN: CPT | Performed by: NURSE PRACTITIONER

## 2022-10-03 PROCEDURE — 82962 GLUCOSE BLOOD TEST: CPT | Performed by: NURSE PRACTITIONER

## 2022-10-03 PROCEDURE — 1036F TOBACCO NON-USER: CPT | Performed by: NURSE PRACTITIONER

## 2022-10-03 PROCEDURE — 3044F HG A1C LEVEL LT 7.0%: CPT | Performed by: NURSE PRACTITIONER

## 2022-10-03 PROCEDURE — 2022F DILAT RTA XM EVC RTNOPTHY: CPT | Performed by: NURSE PRACTITIONER

## 2022-10-03 PROCEDURE — G8427 DOCREV CUR MEDS BY ELIG CLIN: HCPCS | Performed by: NURSE PRACTITIONER

## 2022-10-03 RX ORDER — FLUTICASONE PROPIONATE 50 MCG
1 SPRAY, SUSPENSION (ML) NASAL DAILY
Qty: 3 EACH | Refills: 3 | Status: SHIPPED | OUTPATIENT
Start: 2022-10-03

## 2022-10-03 RX ORDER — LISINOPRIL 10 MG/1
10 TABLET ORAL 2 TIMES DAILY
Qty: 180 TABLET | Refills: 1 | Status: SHIPPED | OUTPATIENT
Start: 2022-10-03

## 2022-10-03 RX ORDER — ATENOLOL 25 MG/1
TABLET ORAL
Qty: 90 TABLET | Refills: 1 | Status: SHIPPED | OUTPATIENT
Start: 2022-10-03

## 2022-10-03 RX ORDER — TAMSULOSIN HYDROCHLORIDE 0.4 MG/1
0.4 CAPSULE ORAL DAILY
Qty: 90 CAPSULE | Refills: 3 | Status: SHIPPED | OUTPATIENT
Start: 2022-10-03

## 2022-10-03 RX ORDER — ATORVASTATIN CALCIUM 40 MG/1
40 TABLET, FILM COATED ORAL DAILY
Qty: 90 TABLET | Refills: 1 | Status: SHIPPED | OUTPATIENT
Start: 2022-10-03

## 2022-10-03 RX ORDER — METFORMIN HYDROCHLORIDE 500 MG/1
500 TABLET, EXTENDED RELEASE ORAL 2 TIMES DAILY WITH MEALS
Qty: 180 TABLET | Refills: 1 | Status: SHIPPED | OUTPATIENT
Start: 2022-10-03

## 2022-10-03 ASSESSMENT — ENCOUNTER SYMPTOMS
CHEST TIGHTNESS: 0
COLOR CHANGE: 0
TROUBLE SWALLOWING: 0
NAUSEA: 0
SHORTNESS OF BREATH: 0
SORE THROAT: 0
SINUS PAIN: 0
WHEEZING: 0
RHINORRHEA: 0
CONSTIPATION: 0
FACIAL SWELLING: 0
ABDOMINAL PAIN: 0
VOICE CHANGE: 0
COUGH: 0
VOMITING: 0
DIARRHEA: 0
BACK PAIN: 0
SINUS PRESSURE: 0

## 2022-10-03 NOTE — PROGRESS NOTES
OFFICE PROGRESS NOTE  [address] [place] [address] [address]  [phone]  Dept: [phone]   Chief Complaint   Patient presents with    Diabetes    Hypertension    Hyperlipidemia       ASSESSMENT/PLAN   1. Type 2 diabetes mellitus with stage 2 chronic kidney disease, without long-term current use of insulin ([facility])  Assessment & Plan:   well controlled, no significant medication side effects noted and A1c 6.8% continue metformin  mg BID, Jardiance 25 mg daily, Dulaglutide 1.5 mg weekly. Monitor BS at different times: 1 day fasting, then next day 2 hours after lunch, next day 2 hours after dinner, next day at bedtime then start over and log all values. Bring log to next appointment  Foot exam every day; wash and dry well between toes, look for any redness, cracks, wounds notify provider if any problems occur  Reminder for annual Eye exam  Reminder for Podiatry visits Q 2 Months for toenail care if needed  Reminder to keep vaccines up dated  Exercise 30 minutes daily  Recommend Diabetic Education Classes if you have not already attended  Orders:  -     POCT Microalbumin  -     POCT glycosylated hemoglobin (Hb A1C)  -     POCT Glucose  -     Dulaglutide 1.5 MG/0.5ML SOPN; Inject 1.5 mg into the skin once a week, Disp-4 Adjustable Dose Pre-filled Pen Syringe, R-12Note increase in doseNormal  -     metFORMIN (GLUCOPHAGE-XR) 500 MG extended release tablet; Take 1 tablet by mouth 2 times daily (with meals), Disp-180 tablet, R-1Plain metformin causes diarrhea, note increase in doseNormal  -     HM DIABETES FOOT EXAM  2. Essential hypertension  Assessment & Plan:   well controlled, no significant medication side effects noted and labs reviewed CMP, CBCD, lipids continue Lisinopril 10 mg daily and atenolol 25 mg daily.   -Discussed taking medication and directed every day.   -Discussed exercising daily 30 minutes 5 times a week for 150 minutes weekly.  -Discussed weight reduction if needed.  -Discussed low sodium diet.  -Discussed limiting caffeine consumption and tobacco cessation and the effects they have on the heart and blood pressure. Orders:  -     atenolol (TENORMIN) 25 MG tablet; TAKE ONE TABLET BY MOUTH ONCE A DAY., Disp-90 tablet, R-1Normal  -     lisinopril (PRINIVIL;ZESTRIL) 10 MG tablet; Take 1 tablet by mouth in the morning and at bedtime, Disp-180 tablet, R-1Note change in doseNormal  3. Mixed hyperlipidemia  Assessment & Plan:   well controlled, no significant medication side effects noted and labs reviewed CMP, lipids, CBCD continue atorvastatin 40 mg daily  -Discussed low fat diet, limit fast food, goodies, breads and pastas if consuming several days a week,  limit any alcohol consumption.  -Discussed weight reduction and exercise 30 minutes 5 days a week for total of 150 minutes weekly.  -Discussed if any unusual muscle aching/pain to contact the office, discussed medication and risk of muscle pain/damage from Rhabdomyolysis. Orders:  -     atorvastatin (LIPITOR) 40 MG tablet; Take 1 tablet by mouth daily, Disp-90 tablet, R-1Normal  4. Benign prostatic hyperplasia with weak urinary stream  Assessment & Plan:   Stable continue Flomax   Orders:  -     tamsulosin (FLOMAX) 0.4 MG capsule; Take 1 capsule by mouth daily, Disp-90 capsule, R-3Normal  5. Elevated ALT measurement  Assessment & Plan:   Gradually worsening will check hepatitis screen, liver US rarely drinks alcohol. Orders:  -     US ABDOMEN LIMITED; Future  -     Hepatitis Panel, Acute; Future  6. Non-seasonal allergic rhinitis due to other allergic trigger  Assessment & Plan:   Stable at this time. Continue the Flonase as directed  Orders:  -     fluticasone (FLONASE) 50 MCG/ACT nasal spray; 1 spray by Nasal route daily, Disp-3 each, R-3Normal  7. Needs flu shot  Assessment & Plan:   Wash your hands regularly, and keep your hands away from your face.   Cover your mouth when you cough or sneeze. Rest, plenty of fluids, Tylenol for body aches, fever. Stay home from school, work, and other public places until you are feeling better and your fever has been gone for at least 24 hours. The fever needs to have gone away on its own without the help of medicine. Ask people living with you to talk to their doctors about preventing the flu. They may get antiviral medicine to keep from getting the flu from you. To prevent the flu in the future, get a flu vaccine every fall. Encourage people living with you to get the vaccine. Orders:  -     Influenza, FLUAD, (age 72 y+), IM, Preservative Free, 0.5 mL     Reviewed labs: CMP, CBCD, Lipids    Discussed reducing caffeine intake, \"smoking cessation,  weight loss,  exercising 30 minutes daily, and taking medications as directed and adverse effects    Return in about 3 months (around 1/3/2023) for DM, HTN, hyperlipidemia, liver enzymes. HPI:   SUBJECTIVE:     58 y.o. male presents today for follow-up of diabetes [place]. In-office bloodsugar is:   Results for POC orders placed in visit on 10/03/22   POCT Microalbumin   Result Value Ref Range    Microalb, Ur 10     Creatinine Ur POCT 100     Microalbumin Creatinine Ratio <30    POCT glycosylated hemoglobin (Hb A1C)   Result Value Ref Range    Hemoglobin A1C 6.8 %   POCT Glucose   Result Value Ref Range    Glucose 211 mg/dL    QC OK? Patient reports being compliant to low carbohydrate diet and increasing weekly exercises. Currently, the patient is treated with medication(s): metformin  mg BID, Dulaglutide 1.5 mg weekly, Jardiance 25 mg daily. Patient reports checking home blood sugar 1 times per day. Patient states home blood sugar ranges from 110 - 180. Patient denies hypoglycemic episodes and understand to take sweetened beverages or a snack if hypoglycemic symptoms are suspected.     Lab Results   Component Value Date     09/26/2022    K 4.4 09/26/2022     09/26/2022 CO2 20 (L) 09/26/2022    BUN 15 09/26/2022    CREATININE 1.3 (H) 09/26/2022    GLUCOSE 211 10/03/2022    CALCIUM 9.2 09/26/2022    PROT 7.4 09/26/2022    LABALBU 4.6 09/26/2022    BILITOT 0.5 09/26/2022    ALKPHOS 96 09/26/2022    AST 34 09/26/2022    ALT 58 (H) 09/26/2022    LABGLOM 56 09/26/2022    GFRAA >60 09/26/2022       Lab Results   Component Value Date    WBC 6.7 09/26/2022    HGB 16.2 09/26/2022    HCT 47.4 09/26/2022    MCV 92.8 09/26/2022     09/26/2022    LYMPHOPCT 23.0 09/26/2022    RBC 5.11 09/26/2022    MCH 31.7 09/26/2022    MCHC 34.2 09/26/2022    RDW 12.4 09/26/2022          Hypertension: Patient here for follow-up of elevated blood pressure. He  is doing some  exercising but not daily and is adherent to low salt diet. Blood pressure is well controlled at home. Cardiac symptoms none. Patient denies chest pain, chest pressure/discomfort, claudication, dyspnea, exertional chest pressure/discomfort, fatigue, irregular heart beat, lower extremity edema, near-syncope, orthopnea, palpitations, paroxysmal nocturnal dyspnea, syncope, and tachypnea. Cardiovascular risk factors: advanced age (older than 54 for men, 72 for women), diabetes mellitus, dyslipidemia, family history of premature cardiovascular disease, hypertension, male gender, obesity (BMI >= 30 kg/m2), and sedentary lifestyle. Use of agents associated with hypertension: none. History of target organ damage: chronic kidney disease. Hyperlipidemia: Patient presents with hyperlipidemia. He was tested because DM, HTN, HLD. His last labs 9/26/2022 showed Total cholesterol of 159, HDL 46, LDL 74,  Triglycerides 195. He denies chest pain, dyspnea, exertional chest pressure/discomfort, fatigue, feeding intolerance, lower extremity edema, palpitations, poor exercise tolerance, syncope, tachypnea, and skin xanthelasma. There is a family history of hyperlipidemia. There is a family history of early ischemia heart disease.      He complains of stiffness in the left elbow about 2 weeks ago, he has been using Advil for the elbow discomfort and seems to be getting better. Surgical History:  has a past surgical history that includes Cholecystectomy, laparoscopic (2010); lymph node biopsy; back surgery; Anus surgery (Feb 2013); Nerve Block (Left, 12/14/2020); Nerve Block (Left, 02/01/2021); and Nerve Block (Left, 2/1/2021). Social History:  reports that he quit smoking about 20 years ago. His smoking use included cigarettes. He has a 37.50 pack-year smoking history. He has never used smokeless tobacco. He reports current alcohol use. He reports that he does not use drugs. Family History: family history includes Cancer in his father; Heart Disease in his brother and father; Heart Surgery in his father; High Cholesterol in his brother and sister; Hypertension in his father and mother. I have reviewed Rohan's allergies, medications, problem list, medical, social and family history and have updated as needed in the electronic medical record    Review of Systems   Constitutional:  Negative for activity change, appetite change, chills, diaphoresis, fatigue, fever and unexpected weight change. HENT:  Negative for congestion, dental problem, drooling, ear discharge, ear pain, facial swelling, hearing loss, mouth sores, nosebleeds, postnasal drip, rhinorrhea, sinus pressure, sinus pain, sneezing, sore throat, tinnitus, trouble swallowing and voice change. Eyes:  Negative for visual disturbance. Respiratory:  Negative for cough, chest tightness, shortness of breath and wheezing. Cardiovascular:  Negative for chest pain, palpitations and leg swelling. Gastrointestinal:  Negative for abdominal pain, constipation, diarrhea, nausea and vomiting. Endocrine: Negative for cold intolerance, heat intolerance, polydipsia, polyphagia and polyuria. Genitourinary:  Negative for difficulty urinating, frequency and urgency.    Musculoskeletal:  Positive for arthralgias (left elbow). Negative for back pain, gait problem, joint swelling, myalgias, neck pain and neck stiffness. Skin:  Negative for color change, pallor, rash and wound. Allergic/Immunologic: Negative for environmental allergies, food allergies and immunocompromised state. Neurological:  Negative for dizziness, tremors, seizures, syncope, facial asymmetry, speech difficulty, weakness, light-headedness, numbness and headaches. Hematological:  Negative for adenopathy. Does not bruise/bleed easily. Psychiatric/Behavioral:  Negative for agitation, behavioral problems, confusion, decreased concentration, dysphoric mood, hallucinations, self-injury, sleep disturbance and suicidal ideas. The patient is not nervous/anxious and is not hyperactive. OBJECTIVE:     VS:  Wt Readings from Last 3 Encounters:   10/03/22 197 lb (89.4 kg)   06/02/22 196 lb 6.4 oz (89.1 kg)   04/22/22 196 lb (88.9 kg)                       Vitals:    10/03/22 0707   BP: 108/78   Pulse: 68   Resp: 16   Temp: 97.3 °F (36.3 °C)   SpO2: 99%   Weight: 197 lb (89.4 kg)   Height: 5' 7.5\" (1.715 m)       General: Alert and oriented to person, place, and time, well developed and well nourished, in no acute distress  SKIN: Warm and dry, intact without any rash, masses or lesions  HEAD: normocephalic, atraumatic  Eyes: sclera/conjunctiva clear, PERRLA, EOMI's intact  ENT: tympanic membranes, external ear and ear canal normal bilaterally, normal hearing, Nose without deformity, nasal mucosa and turbinates normal without polyps   Throat: clear, tongue midline, tonsils 1+, drainage, no masses or lesions noted, good dentition  Neck: supple and non-tender without mass, trachea midline, no cervical lymphadenopathy, no bruit, no thyromegaly or nodules  Cardiovascular: regular rate and regular rhythm, normal S1 and S2,  no murmurs, rubs, clicks, or gallop. Distal pulses intact, no carotid bruits.  No edema  Pulmonary/Chest: clear to auscultation bilaterally, no wheezes, rales or rhonchi, normal air movement, no respiratory distress  Abdomen: soft, non-tender, non-distended, normal bowel sounds, no masses or hepatosplenomegaly  Musculoskeletal: Normal ROM, no joint swelling, deformity or tenderness   Neurologic:  gait, coordination and speech normal  Extremities: no clubbing, cyanosis, or edema. Psychiatric: Good eye contact, normal mood and affect, answers questions appropriately  Foot Exam:  No signs of infection and/or necrosis noted. Sensation intact and equal bilaterally. Monofilament normal bilateral,  Interdigit spaces exhibit no abnormal skin changes and/or surface growth. I have reviewed my findings and recommendations with Denise Martin.     Edgar De, APRN - CNP, NP-C, FNP-BC

## 2022-10-03 NOTE — ASSESSMENT & PLAN NOTE
well controlled, no significant medication side effects noted and labs reviewed CMP, lipids, CBCD continue atorvastatin 40 mg daily  -Discussed low fat diet, limit fast food, goodies, breads and pastas if consuming several days a week,  limit any alcohol consumption.  -Discussed weight reduction and exercise 30 minutes 5 days a week for total of 150 minutes weekly.  -Discussed if any unusual muscle aching/pain to contact the office, discussed medication and risk of muscle pain/damage from Rhabdomyolysis.

## 2022-10-03 NOTE — ASSESSMENT & PLAN NOTE
well controlled, no significant medication side effects noted and labs reviewed CMP, CBCD, lipids continue Lisinopril 10 mg daily and atenolol 25 mg daily.   -Discussed taking medication and directed every day. -Discussed exercising daily 30 minutes 5 times a week for 150 minutes weekly.  -Discussed weight reduction if needed.  -Discussed low sodium diet.  -Discussed limiting caffeine consumption and tobacco cessation and the effects they have on the heart and blood pressure.

## 2022-10-03 NOTE — ASSESSMENT & PLAN NOTE
well controlled, no significant medication side effects noted and A1c 6.8% continue metformin  mg BID, Jardiance 25 mg daily, Dulaglutide 1.5 mg weekly. Monitor BS at different times: 1 day fasting, then next day 2 hours after lunch, next day 2 hours after dinner, next day at bedtime then start over and log all values.   Bring log to next appointment  Foot exam every day; wash and dry well between toes, look for any redness, cracks, wounds notify provider if any problems occur  Reminder for annual Eye exam  Reminder for Podiatry visits Q 2 Months for toenail care if needed  Reminder to keep vaccines up dated  Exercise 30 minutes daily  Recommend Diabetic Education Classes if you have not already attended

## 2022-10-04 LAB
HAV IGM SER IA-ACNC: NORMAL
HEPATITIS B CORE IGM ANTIBODY: NORMAL
HEPATITIS B SURFACE ANTIGEN INTERPRETATION: NORMAL
HEPATITIS C ANTIBODY INTERPRETATION: NORMAL

## 2022-12-16 ENCOUNTER — OFFICE VISIT (OUTPATIENT)
Dept: PAIN MANAGEMENT | Age: 62
End: 2022-12-16
Payer: COMMERCIAL

## 2022-12-16 VITALS
SYSTOLIC BLOOD PRESSURE: 142 MMHG | HEART RATE: 82 BPM | DIASTOLIC BLOOD PRESSURE: 80 MMHG | WEIGHT: 197 LBS | TEMPERATURE: 97.1 F | OXYGEN SATURATION: 96 % | BODY MASS INDEX: 29.86 KG/M2 | HEIGHT: 68 IN | RESPIRATION RATE: 16 BRPM

## 2022-12-16 DIAGNOSIS — M51.9 LUMBAR DISC DISORDER: ICD-10-CM

## 2022-12-16 DIAGNOSIS — M48.061 SPINAL STENOSIS OF LUMBAR REGION WITHOUT NEUROGENIC CLAUDICATION: ICD-10-CM

## 2022-12-16 DIAGNOSIS — M47.816 LUMBAR FACET ARTHROPATHY: ICD-10-CM

## 2022-12-16 DIAGNOSIS — M48.061 FORAMINAL STENOSIS OF LUMBAR REGION: Primary | ICD-10-CM

## 2022-12-16 DIAGNOSIS — M48.061 STENOSIS OF LATERAL RECESS OF LUMBAR SPINE: ICD-10-CM

## 2022-12-16 DIAGNOSIS — M54.16 LUMBAR RADICULOPATHY: ICD-10-CM

## 2022-12-16 DIAGNOSIS — M47.816 LUMBAR SPONDYLOSIS: ICD-10-CM

## 2022-12-16 DIAGNOSIS — G89.4 CHRONIC PAIN SYNDROME: ICD-10-CM

## 2022-12-16 PROCEDURE — G8417 CALC BMI ABV UP PARAM F/U: HCPCS | Performed by: PAIN MEDICINE

## 2022-12-16 PROCEDURE — 3078F DIAST BP <80 MM HG: CPT | Performed by: PAIN MEDICINE

## 2022-12-16 PROCEDURE — G8484 FLU IMMUNIZE NO ADMIN: HCPCS | Performed by: PAIN MEDICINE

## 2022-12-16 PROCEDURE — 3074F SYST BP LT 130 MM HG: CPT | Performed by: PAIN MEDICINE

## 2022-12-16 PROCEDURE — 3017F COLORECTAL CA SCREEN DOC REV: CPT | Performed by: PAIN MEDICINE

## 2022-12-16 PROCEDURE — 1036F TOBACCO NON-USER: CPT | Performed by: PAIN MEDICINE

## 2022-12-16 PROCEDURE — 99213 OFFICE O/P EST LOW 20 MIN: CPT | Performed by: PAIN MEDICINE

## 2022-12-16 PROCEDURE — G8427 DOCREV CUR MEDS BY ELIG CLIN: HCPCS | Performed by: PAIN MEDICINE

## 2022-12-16 PROCEDURE — 99214 OFFICE O/P EST MOD 30 MIN: CPT | Performed by: PAIN MEDICINE

## 2022-12-16 RX ORDER — SODIUM CHLORIDE 0.9 % (FLUSH) 0.9 %
5-40 SYRINGE (ML) INJECTION EVERY 12 HOURS SCHEDULED
OUTPATIENT
Start: 2022-12-16

## 2022-12-16 RX ORDER — SODIUM CHLORIDE 0.9 % (FLUSH) 0.9 %
5-40 SYRINGE (ML) INJECTION PRN
OUTPATIENT
Start: 2022-12-16

## 2022-12-16 RX ORDER — SODIUM CHLORIDE 9 MG/ML
INJECTION, SOLUTION INTRAVENOUS PRN
OUTPATIENT
Start: 2022-12-16

## 2022-12-16 NOTE — PROGRESS NOTES
[facility]  [address] [facility], [address] [place] [address]  [phone]    Follow up Note      Michoacano Rosen     Date of Visit:  12/16/2022    CC:  Patient presents for follow up   Chief Complaint   Patient presents with    Lower Back Pain       HPI:    Office extension for worsening low back pain, last seen 11/2021. Pain is described as sharp/aching/constant. Pain is aggravated by twisting/relieved by OTC pain medications. Appropriate analgesia with current medications regimen:N/A. Change in quality of symptoms:no. Medication side effects:not applicable . Recent diagnostic testing:none. Recent interventional procedures:none     He has not been on anticoagulation medications to include none. The patient  has not been on herbal supplements. The patient is diabetic. Imaging:   Lumbar spine MRI 2020:  Degenerative disc disease as described above. Spinal canal narrowing, moderate at L4-5. Narrowing of the bilateral lateral recesses at L4-5, contacting left exiting    L4 and left descending L5 nerve roots, and contacting the right descending L5    nerve root. Foraminal narrowing, severe at left L5-S1, moderate to severe at right L5-S1,    moderate at bilateral L4-5, mild at left L3-4. Previous treatments: Physical Therapy and medications. .                                         Potential Aberrant Drug-Related Behavior:    No     Urine Drug Screening:  none     OARRS report:  12/2022 consistent    Opioid Agreement:  Renewal date:N/A    Past Medical History:   Diagnosis Date    Allergic rhinitis due to allergen 10/3/2022    Back skin lesion 5/1/2017    BPPV (benign paroxysmal positional vertigo), bilateral 9/27/2021    Cervical radiculopathy     Bilateral upper extemities    Corns and callus 7/9/2020    Degeneration, intervertebral disc, cervical     C4-C5 through C6-C7 levels    Diabetes mellitus (HCC)     Headache(784.0)     Hyperlipidemia Hypertension     Low back pain radiating to left lower extremity 2/17/2020    Peptic ulcer 1/21/2020    Pressure in left side of chest 6/18/2021    Spinal stenosis     Congenital     Type 2 diabetes mellitus without complication, without long-term current use of insulin ([facility] [address] 8/15/2016    Urinary frequency 2/25/2022     Past Surgical History:   Procedure Laterality Date    ANUS SURGERY  Feb 2013    Excision Rianna-anal condyloma, anoscopy    BACK SURGERY      epidural    CHOLECYSTECTOMY, LAPAROSCOPIC  2010    LYMPH NODE BIOPSY      benign    NERVE BLOCK Left 12/14/2020    LEFT L4 AND L5 TRANSFORAMINAL EPIDURAL STEROID INJECTION (CPT 07929) performed by Mike Huitron MD at [address] [place] [address] Left 02/01/2021    TFESI    NERVE BLOCK Left 2/1/2021    LEFT L4 AND L5 TRANSFORAMINAL EPIDURAL STEROID INJECTION WITH SEDATION (CPT 54371) performed by Mike Huitron MD at [address] [place] [address]     Prior to Admission medications    Medication Sig Start Date End Date Taking? Authorizing Provider   atenolol (TENORMIN) 25 MG tablet TAKE ONE TABLET BY MOUTH ONCE A DAY.  10/3/22  Yes Antonina Carlos APRN - CNP   atorvastatin (LIPITOR) 40 MG tablet Take 1 tablet by mouth daily 10/3/22  Yes Antonina Carlos APRN - CNP   Dulaglutide 1.5 MG/0.5ML SOPN Inject 1.5 mg into the skin once a week 10/3/22  Yes Antonina Carlos APRN - CNP   fluticasone (FLONASE) 50 MCG/ACT nasal spray 1 spray by Nasal route daily 10/3/22  Yes Antonina Carlos APRN - CNP   lisinopril (PRINIVIL;ZESTRIL) 10 MG tablet Take 1 tablet by mouth in the morning and at bedtime 10/3/22  Yes Antonina Carlos APRN - CNP   metFORMIN (GLUCOPHAGE-XR) 500 MG extended release tablet Take 1 tablet by mouth 2 times daily (with meals) 10/3/22  Yes Antonina Carlos APRN - CNP   tamsulosin (FLOMAX) 0.4 MG capsule Take 1 capsule by mouth daily 10/3/22  Yes Antonina Carlos APRN - CNP   empagliflozin (JARDIANCE) 25 MG tablet Take 1 tablet by mouth daily 6/2/22  Yes SUZIE Padilla CNP blood glucose monitor strips Test 1 times a day & as needed for symptoms of irregular blood glucose. 22  Yes SUZIE Alvarez CNP   Lancets MISC 1 each by Does not apply route daily 22  Yes SUZIE Hernandez CNP   albuterol sulfate HFA (VENTOLIN HFA) 108 (90 Base) MCG/ACT inhaler Inhale 2 puffs into the lungs 4 times daily as needed for Wheezing 22  Yes Elvis Dorman,    nitroGLYCERIN (NITROSTAT) 0.4 MG SL tablet DISSOLVE 1 TABLET UNDER THE TONGUE AS NEEDED 21  Yes Historical Provider, MD   hydrocortisone 2.5 % cream Apply topically 2 times daily to affected areas of skin. 21  Yes Kody Persaud PA-C   Blood Glucose Monitoring Suppl (ACURA BLOOD GLUCOSE METER) w/Device KIT 1 Device by Does not apply route daily 20  Yes SUZIE Hernandez CNP   Multiple Vitamin (MULTI-VITAMIN) TABS Take 1 tablet by mouth Daily.    Yes Historical Provider, MD       Allergies   Allergen Reactions    Benadryl [Diphenhydramine]      Felt weird    Lopid [Gemfibrozil] Other (See Comments)     Enlarged prostate       Social History     Socioeconomic History    Marital status:      Spouse name: Not on file    Number of children: Not on file    Years of education: Not on file    Highest education level: Not on file   Occupational History    Not on file   Tobacco Use    Smoking status: Former     Packs/day: 1.50     Years: 25.00     Pack years: 37.50     Types: Cigarettes     Quit date: 2002     Years since quittin.8    Smokeless tobacco: Never   Vaping Use    Vaping Use: Never used   Substance and Sexual Activity    Alcohol use: Yes     Comment: Social     Drug use: No    Sexual activity: Yes     Partners: Female   Other Topics Concern    Not on file   Social History Narrative    Not on file     Social Determinants of Health     Financial Resource Strain: Low Risk     Difficulty of Paying Living Expenses: Not hard at all   Food Insecurity: No Food Insecurity    Worried About Running Out of Food in the Last Year: Never true    Ran Out of Food in the Last Year: Never true   Transportation Needs: Not on file   Physical Activity: Not on file   Stress: Not on file   Social Connections: Not on file   Intimate Partner Violence: Not on file   Housing Stability: Not on file     Family History   Problem Relation Age of Onset    Hypertension Mother     Hypertension Father     Cancer Father         Prostate    Heart Surgery Father     Heart Disease Father     High Cholesterol Sister     High Cholesterol Brother     Heart Disease Brother      REVIEW OF SYSTEMS:     Khushboo Aw denies fever/chills, chest pain, shortness of breath, new bowel or bladder complaints. All other review of systems was negative. PHYSICAL EXAMINATION:      BP (!) 142/80   Pulse 82   Temp 97.1 °F (36.2 °C) (Infrared)   Resp 16   Ht 5' 7.5\" (1.715 m)   Wt 197 lb (89.4 kg)   SpO2 96%   BMI 30.40 kg/m²     General:       General appearance:   pleasant and well-hydrated. , in moderate discomfort and A & O x3  Build:Normal Weight     HEENT:     Head:normocephalic and atraumatic  Sclera: icterus absent,      Lungs:     Breathing:Breathing Pattern: regular, no distress     Abdomen:     Shape:non-distended and normal  Tenderness:none     Lumbar spine:     Spine inspection:normal   CVA tenderness:No   Palpation:tenderness paravertebral muscles, facet loading, left, right, positive and tenderness. Range of motion:abnormal moderately Lateral bending, flexion, extension rotation bilateral and is  painful. Musculoskeletal:     Trigger points in Paraveteral:present right trapezius and infraspinatus. SI joint tenderness:negative right, negative left  SLR:negative right, negative left, sitting      Extremities:     Tremors:None bilaterally upper and lower  Range of motion:pain with internal rotation of hips negative. Normal ROM of right shoulder.   Intact:Yes  Edema:Normal     Neurological:     Sensory:normal to light touch bilateral lower extremities. Motor:                             Right Quadriceps5/5          Left Quadriceps5/5           Right Gastrocnemius5/5    Left Gastrocnemius5/5  Right Ant Tibialis5/5  Left Ant Tibialis5/5  Reflexes:    Right Quadriceps reflex2+  Left Quadriceps reflex2+  Right Achilles reflex2+  Left Achilles reflex2+  Gait:normal     Dermatology:     Skin:no unusual rashes and no skin lesions    Impression:    Low back pain with radiation to the Left lower extremity along the Left L4 dermatome. Lumbar spine MRI 2020:L4-5 Left paracentral disc extrusion   Plan:  Office extension for worsening low back pain with no radicular symptoms, last seen 11/2021. Reviewed lumbar spine MRI. Will schedule patient for bilateral L3,4,5 MBB(mechanical low back pain)  Continue with OTC pain medications. Kalia Hahn OARRS report reviewed 12/2021. Patient encouraged to stay active and to lose weight. Treatment plan discussed with the patient including procedure side effects. We discussed with the patient that combining opioids, benzodiazepines, alcohol, illicit drugs or sleep aids increases the risk of respiratory depression including death. We discussed that these medications may cause drowsiness, sedation or dizziness and have counseled the patient not to drive or operate machinery. We have discussed that these medications will be prescribed only by one provider. We have discussed with the patient about age related risk factors and have thoroughly discussed the importance of taking these medications as prescribed. The patient verbalizes understanding. ccrefphaniing emory Glover M.D.

## 2022-12-16 NOTE — PROGRESS NOTES
01/23/2020  Comfort Cantu is a 64 y.o., female.    Anesthesia Evaluation    I have reviewed the Patient Summary Reports.    I have reviewed the Nursing Notes.      Review of Systems  Anesthesia Hx:  No problems with previous Anesthesia    Social:  Smoker    Cardiovascular:   Hypertension, well controlled    Hepatic/GI:   GERD, well controlled        Physical Exam  General:  Well nourished    Airway/Jaw/Neck:  Airway Findings: Mallampati: II                Anesthesia Plan  Type of Anesthesia, risks & benefits discussed:  Anesthesia Type:  general  Patient's Preference:   Intra-op Monitoring Plan:   Intra-op Monitoring Plan Comments:   Post Op Pain Control Plan:   Post Op Pain Control Plan Comments:   Induction:   IV  Beta Blocker:  Patient is not currently on a Beta-Blocker (No further documentation required).       Informed Consent: Patient understands risks and agrees with Anesthesia plan.  Questions answered. Anesthesia consent signed with patient.  ASA Score: 2     Day of Surgery Review of History & Physical:    H&P update referred to the surgeon.         Ready For Surgery From Anesthesia Perspective.        Do you currently have any of the following:    Fever: No  Headache:  No  Cough: No  Shortness of breath: No  Exposed to anyone with these symptoms: No                                                                                                                Tramaine Holm presents to the [address] [address] on 12/16/2022. Emmie Babcock is complaining of pain in his lower back on the right side. . The pain is constant. The pain is described as aching, stabbing, and sharp. Pain is rated on his best day at a 3, on his worst day at a 9, and on average at a 6 on the VAS scale. He took his last dose of Tylenol . Emmie Babcock does not have issues with constipation. Any procedures since your last visit: No,     He is not on NSAIDS and  is not on anticoagulation medications to include none and is managed by NA. Pacemaker or defibrillator: No Physician managing device is NA. Medication Contract and Consent for Opioid Use Documents Filed        No documents found                       BP (!) 142/80   Pulse 82   Temp 97.1 °F (36.2 °C) (Infrared)   Resp 16   Ht 5' 7.5\" (1.715 m)   Wt 197 lb (89.4 kg)   SpO2 96%   BMI 30.40 kg/m²      No LMP for male patient.

## 2023-01-09 ENCOUNTER — OFFICE VISIT (OUTPATIENT)
Dept: FAMILY MEDICINE CLINIC | Age: 63
End: 2023-01-09
Payer: COMMERCIAL

## 2023-01-09 ENCOUNTER — TELEPHONE (OUTPATIENT)
Dept: PAIN MANAGEMENT | Age: 63
End: 2023-01-09

## 2023-01-09 VITALS
OXYGEN SATURATION: 96 % | DIASTOLIC BLOOD PRESSURE: 68 MMHG | HEART RATE: 71 BPM | TEMPERATURE: 97.7 F | BODY MASS INDEX: 29.7 KG/M2 | RESPIRATION RATE: 16 BRPM | SYSTOLIC BLOOD PRESSURE: 122 MMHG | HEIGHT: 68 IN | WEIGHT: 196 LBS

## 2023-01-09 DIAGNOSIS — E11.22 TYPE 2 DIABETES MELLITUS WITH STAGE 2 CHRONIC KIDNEY DISEASE, WITHOUT LONG-TERM CURRENT USE OF INSULIN (HCC): Primary | ICD-10-CM

## 2023-01-09 DIAGNOSIS — N18.2 TYPE 2 DIABETES MELLITUS WITH STAGE 2 CHRONIC KIDNEY DISEASE, WITHOUT LONG-TERM CURRENT USE OF INSULIN (HCC): Primary | ICD-10-CM

## 2023-01-09 DIAGNOSIS — H81.13 BENIGN PAROXYSMAL POSITIONAL VERTIGO DUE TO BILATERAL VESTIBULAR DISORDER: ICD-10-CM

## 2023-01-09 DIAGNOSIS — Z12.5 SCREENING FOR PROSTATE CANCER: ICD-10-CM

## 2023-01-09 DIAGNOSIS — K76.0 NAFLD (NONALCOHOLIC FATTY LIVER DISEASE): ICD-10-CM

## 2023-01-09 DIAGNOSIS — E78.2 MIXED HYPERLIPIDEMIA: ICD-10-CM

## 2023-01-09 DIAGNOSIS — I10 ESSENTIAL HYPERTENSION: ICD-10-CM

## 2023-01-09 LAB
CHP ED QC CHECK: ABNORMAL
GLUCOSE BLD-MCNC: 272 MG/DL
HBA1C MFR BLD: 7.1 %

## 2023-01-09 PROCEDURE — 3078F DIAST BP <80 MM HG: CPT | Performed by: NURSE PRACTITIONER

## 2023-01-09 PROCEDURE — G8417 CALC BMI ABV UP PARAM F/U: HCPCS | Performed by: NURSE PRACTITIONER

## 2023-01-09 PROCEDURE — 99214 OFFICE O/P EST MOD 30 MIN: CPT | Performed by: NURSE PRACTITIONER

## 2023-01-09 PROCEDURE — 2022F DILAT RTA XM EVC RTNOPTHY: CPT | Performed by: NURSE PRACTITIONER

## 2023-01-09 PROCEDURE — G8427 DOCREV CUR MEDS BY ELIG CLIN: HCPCS | Performed by: NURSE PRACTITIONER

## 2023-01-09 PROCEDURE — 3051F HG A1C>EQUAL 7.0%<8.0%: CPT | Performed by: NURSE PRACTITIONER

## 2023-01-09 PROCEDURE — 1036F TOBACCO NON-USER: CPT | Performed by: NURSE PRACTITIONER

## 2023-01-09 PROCEDURE — 3017F COLORECTAL CA SCREEN DOC REV: CPT | Performed by: NURSE PRACTITIONER

## 2023-01-09 PROCEDURE — 82962 GLUCOSE BLOOD TEST: CPT | Performed by: NURSE PRACTITIONER

## 2023-01-09 PROCEDURE — 83036 HEMOGLOBIN GLYCOSYLATED A1C: CPT | Performed by: NURSE PRACTITIONER

## 2023-01-09 PROCEDURE — G8484 FLU IMMUNIZE NO ADMIN: HCPCS | Performed by: NURSE PRACTITIONER

## 2023-01-09 PROCEDURE — 3074F SYST BP LT 130 MM HG: CPT | Performed by: NURSE PRACTITIONER

## 2023-01-09 RX ORDER — CHLORAL HYDRATE 500 MG
CAPSULE ORAL DAILY
COMMUNITY

## 2023-01-09 RX ORDER — ATORVASTATIN CALCIUM 80 MG/1
80 TABLET, FILM COATED ORAL NIGHTLY
Qty: 90 TABLET | Refills: 0 | Status: SHIPPED | OUTPATIENT
Start: 2023-01-09

## 2023-01-09 RX ORDER — METFORMIN HYDROCHLORIDE 500 MG/1
500 TABLET, EXTENDED RELEASE ORAL 2 TIMES DAILY WITH MEALS
Qty: 180 TABLET | Refills: 1 | Status: SHIPPED | OUTPATIENT
Start: 2023-01-09

## 2023-01-09 RX ORDER — LISINOPRIL 10 MG/1
10 TABLET ORAL DAILY
Qty: 90 TABLET | Refills: 1 | Status: SHIPPED | OUTPATIENT
Start: 2023-01-09

## 2023-01-09 RX ORDER — ATENOLOL 25 MG/1
TABLET ORAL
Qty: 90 TABLET | Refills: 1 | Status: SHIPPED | OUTPATIENT
Start: 2023-01-09

## 2023-01-09 ASSESSMENT — ENCOUNTER SYMPTOMS
SORE THROAT: 0
SHORTNESS OF BREATH: 0
SINUS PAIN: 0
VOICE CHANGE: 0
FACIAL SWELLING: 0
TROUBLE SWALLOWING: 0
CHEST TIGHTNESS: 0
SINUS PRESSURE: 0
NAUSEA: 0
BACK PAIN: 1
COUGH: 0
COLOR CHANGE: 0
DIARRHEA: 0
RHINORRHEA: 0
WHEEZING: 0
ABDOMINAL PAIN: 0
CONSTIPATION: 0
VOMITING: 0

## 2023-01-09 ASSESSMENT — PATIENT HEALTH QUESTIONNAIRE - PHQ9
SUM OF ALL RESPONSES TO PHQ QUESTIONS 1-9: 2
2. FEELING DOWN, DEPRESSED OR HOPELESS: 1
SUM OF ALL RESPONSES TO PHQ QUESTIONS 1-9: 2
1. LITTLE INTEREST OR PLEASURE IN DOING THINGS: 1
SUM OF ALL RESPONSES TO PHQ QUESTIONS 1-9: 2
SUM OF ALL RESPONSES TO PHQ QUESTIONS 1-9: 2
SUM OF ALL RESPONSES TO PHQ9 QUESTIONS 1 & 2: 2

## 2023-01-09 NOTE — PROGRESS NOTES
OFFICE PROGRESS NOTE  [address] [place] [address] [address]  [phone] [id number]  Dept: [phone]   Chief Complaint   Patient presents with    Hypertension    Hyperlipidemia    Diabetes     Has been out of medication for 1 week, patient had breakfast this morning. Health Maintenance    Dizziness     Still having dizziness. ASSESSMENT/PLAN   1. Type 2 diabetes mellitus with stage 2 chronic kidney disease, without long-term current use of insulin ([facility])  Assessment & Plan:   reasonably well controlled, no significant medication side effects noted and he had been out of jardiance and trulicity for 1 week. Continue medications refills sent today, continue metformin  mg BID. Trulicity 1.5 mg weekly, Jardiance 25 mg daily  Monitor BS at different times: 1 day fasting, then next day 2 hours after lunch, next day 2 hours after dinner, next day at bedtime then start over and log all values. Bring log to next appointment  Foot exam every day; wash and dry well between toes, look for any redness, cracks, wounds notify provider if any problems occur  Reminder for annual Eye exam  Reminder for Podiatry visits Q 2 Months for toenail care if needed  Reminder to keep vaccines up dated  Exercise 30 minutes daily  Recommend Diabetic Education Classes if you have not already attended  Orders:  -     POCT glycosylated hemoglobin (Hb A1C)  -     POCT Glucose  -     empagliflozin (JARDIANCE) 25 MG tablet; Take 1 tablet by mouth daily, Disp-30 tablet, R-6Will they cover 90 day supply? Normal  -     dulaglutide (TRULICITY) 1.5 OE/6.9FX SC injection; Inject 0.5 mLs into the skin once a week, Disp-4 Adjustable Dose Pre-filled Pen Syringe, R-12Note increase in doseNormal  -     metFORMIN (GLUCOPHAGE-XR) 500 MG extended release tablet;  Take 1 tablet by mouth 2 times daily (with meals), Disp-180 tablet, R-1Plain metformin causes diarrhea, note increase in doseNormal  -     Lipid Panel; Future  -     Comprehensive Metabolic Panel; Future  -     CBC with Auto Differential; Future  2. Essential hypertension  Assessment & Plan:   well controlled, no significant medication side effects noted and fasting labs ordered for 3 months  -Discussed taking medication and directed every day. -Discussed exercising daily 30 minutes 5 times a week for 150 minutes weekly.  -Discussed weight reduction if needed.  -Discussed low sodium diet.  -Discussed limiting caffeine consumption and tobacco cessation and the effects they have on the heart and blood pressure. Orders:  -     atenolol (TENORMIN) 25 MG tablet; TAKE ONE TABLET BY MOUTH ONCE A DAY., Disp-90 tablet, R-1Normal  -     lisinopril (PRINIVIL;ZESTRIL) 10 MG tablet; Take 1 tablet by mouth daily, Disp-90 tablet, R-1Note change in doseNormal  -     Lipid Panel; Future  -     Comprehensive Metabolic Panel; Future  -     CBC with Auto Differential; Future  3. Mixed hyperlipidemia  Assessment & Plan:   reasonably well controlled, no significant medication side effects noted and lipids not at goal will increase atorvastatin 80 mg nightly and repeat labs in 8 weeks  -Discussed low fat diet, limit fast food, goodies, breads and pastas if consuming several days a week,  limit any alcohol consumption.  -Discussed weight reduction and exercise 30 minutes 5 days a week for total of 150 minutes weekly.  -Discussed if any unusual muscle aching/pain to contact the office, discussed medication and risk of muscle pain/damage from Rhabdomyolysis. -Discussed repeat labs in 8 weeks. Orders:  -     atorvastatin (LIPITOR) 80 MG tablet; Take 1 tablet by mouth at bedtime, Disp-90 tablet, R-0Note increase in doseNormal  -     Lipid Panel; Future  -     Comprehensive Metabolic Panel; Future  -     CBC with Auto Differential; Future  4.  Benign paroxysmal positional vertigo due to bilateral vestibular disorder  Assessment & Plan:   Recurrent he is seeing chiropractor referral to PT Continue Exercises if helping. Orders:  -     Amb External Referral To Physical Therapy  5. NAFLD (nonalcoholic fatty liver disease)  Assessment & Plan:   Gradually worsening, increase at atorvastatin 80 mg nightly and recheck labs in 8 weeks fasting. Liver US indicates fatty liver disease and ALT gradually worse than last time. 6. Screening for prostate cancer  -     PSA Screening; Future       Reviewed labs: CMP, CBCD, Lipids,     Reviewed notes from DR Li 9/2022. Reviewed radiology liver US      weight loss,  exercising 30 minutes daily, and taking medications as directed and adverse effects    Return in about 3 months (around 4/9/2023) for DM, HTN, hyperlipidemia. HPI:   SUBJECTIVE:     58 y.o. male presents today for follow-up of diabetes [place]. In-office bloodsugar is:   Results for POC orders placed in visit on 01/09/23   POCT glycosylated hemoglobin (Hb A1C)   Result Value Ref Range    Hemoglobin A1C 7.1 %   POCT Glucose   Result Value Ref Range    Glucose 272 mg/dL    QC OK? Patient reports being compliant to low carbohydrate diet and increasing weekly exercises. Currently, the patient is treated with medication(s): Dulaglutide 1.5 mg weekly, metformin  mg BID, Jardiance 25 mg daily. Patient reports checking home blood sugar 1 times per day. Patient states home blood sugar ranges from 180 - or greater fasting but he states he hasn't had his Jardiance and Trulicity for the last week prior to this running 150. Patient denies hypoglycemic episodes and understand to take sweetened beverages or a snack if hypoglycemic symptoms are suspected. Hypertension: Patient here for follow-up of elevated blood pressure. He is exercising and is adherent to low salt diet. Blood pressure is well controlled at home. Cardiac symptoms none.  Patient denies chest pain, chest pressure/discomfort, claudication, dyspnea, exertional chest pressure/discomfort, fatigue, irregular heart beat, lower extremity edema, near-syncope, orthopnea, palpitations, paroxysmal nocturnal dyspnea, syncope, and tachypnea. Cardiovascular risk factors: advanced age (older than 54 for men, 72 for women), diabetes mellitus, dyslipidemia, family history of premature cardiovascular disease, hypertension, male gender, microalbuminuria, obesity (BMI >= 30 kg/m2), and smoking/ tobacco exposure. Use of agents associated with hypertension: none. History of target organ damage: chronic kidney disease. Hyperlipidemia: Patient presents with hyperlipidemia. He was tested because DM, HTN, HLD. His last labs 9/26/22 on Atorvastatin 40 mg nightly showed Total cholesterol of 159, HDL 46, LDL 74,  Triglycerides 195. He denies chest pain, dyspnea, exertional chest pressure/discomfort, fatigue, feeding intolerance, lower extremity edema, palpitations, poor exercise tolerance, syncope, tachypnea, and skin xanthelasma. There is a family history of hyperlipidemia. There is a family history of early ischemia heart disease. He complains of dizziness when looking up and feels it is secondary to his stenosis. HE is seeing Dr Alex Hobson chiropractor and has another appt on Friday. He has been doing the [employer] exercises 5 reps 3 times a day. He declines PT again today and would like to see how it goes. with the chiropractor. Surgical History:  has a past surgical history that includes Cholecystectomy, laparoscopic (2010); lymph node biopsy; back surgery; Anus surgery (Feb 2013); Nerve Block (Left, 12/14/2020); Nerve Block (Left, 02/01/2021); and Nerve Block (Left, 2/1/2021). Social History:  reports that he quit smoking about 20 years ago. His smoking use included cigarettes. He has a 37.50 pack-year smoking history. He has never used smokeless tobacco. He reports current alcohol use. He reports that he does not use drugs.   Family History: family history includes Cancer in his father; Heart Disease in his brother and father; Heart Surgery in his father; High Cholesterol in his brother and sister; Hypertension in his father and mother. I have reviewed Rohan's allergies, medications, problem list, medical, social and family history and have updated as needed in the electronic medical record    Review of Systems   Constitutional:  Negative for activity change, appetite change, chills, diaphoresis, fatigue, fever and unexpected weight change. HENT:  Positive for congestion. Negative for dental problem, drooling, ear discharge, ear pain, facial swelling, hearing loss, mouth sores, nosebleeds, postnasal drip, rhinorrhea, sinus pressure, sinus pain, sneezing, sore throat, tinnitus, trouble swallowing and voice change. Eyes:  Negative for visual disturbance. Respiratory:  Negative for cough, chest tightness, shortness of breath and wheezing. Cardiovascular:  Negative for chest pain, palpitations and leg swelling. Gastrointestinal:  Negative for abdominal pain, constipation, diarrhea, nausea and vomiting. Endocrine: Negative for cold intolerance, heat intolerance, polydipsia, polyphagia and polyuria. Genitourinary:  Negative for difficulty urinating, frequency and urgency. Musculoskeletal:  Positive for back pain (following with Dr Steffen Siegel). Negative for arthralgias, gait problem, joint swelling, myalgias, neck pain and neck stiffness. Skin:  Negative for color change, pallor, rash and wound. Allergic/Immunologic: Negative for environmental allergies, food allergies and immunocompromised state. Neurological:  Positive for dizziness (rolling over in bed, looking up). Negative for tremors, seizures, syncope, facial asymmetry, speech difficulty, weakness, light-headedness, numbness and headaches. Hematological:  Negative for adenopathy. Does not bruise/bleed easily.    Psychiatric/Behavioral:  Negative for agitation, behavioral problems, confusion, decreased concentration, dysphoric mood, hallucinations, self-injury, sleep disturbance and suicidal ideas. The patient is not nervous/anxious and is not hyperactive. OBJECTIVE:     VS:  Wt Readings from Last 3 Encounters:   01/09/23 196 lb (88.9 kg)   12/16/22 197 lb (89.4 kg)   10/03/22 197 lb (89.4 kg)                       Vitals:    01/09/23 0659   BP: 122/68   Pulse: 71   Resp: 16   Temp: 97.7 °F (36.5 °C)   SpO2: 96%   Weight: 196 lb (88.9 kg)   Height: 5' 7.5\" (1.715 m)       General: Alert and oriented to person, place, and time, well developed and well nourished, in no acute distress  SKIN: Warm and dry, intact without any rash, masses or lesions  HEAD: normocephalic, atraumatic  Eyes: sclera/conjunctiva clear, PERRLA, EOMI's intact  ENT: tympanic membranes, external ear and ear canal normal bilaterally, normal hearing, Nose without deformity, nasal mucosa and turbinates normal without polyps   Throat: clear, tongue midline, tonsils 2+, drainage, no masses or lesions noted, good dentition  Neck: supple and non-tender without mass, trachea midline, no cervical lymphadenopathy, no bruit, no thyromegaly or nodules  Cardiovascular: regular rate and regular rhythm, normal S1 and S2, no murmurs, rubs, clicks, or gallop. Distal pulses intact, no carotid bruits. No edema  Pulmonary/Chest: clear to auscultation bilaterally, no wheezes, rales or rhonchi, normal air movement, no respiratory distress  Abdomen: soft, non-tender, distended, normal bowel sounds, no masses or hepatosplenomegaly  Musculoskeletal: Normal ROM, no joint swelling, deformity or tenderness   Neurologic:  gait, coordination and speech normal  Extremities: no clubbing, cyanosis, or edema. Psychiatric: Good eye contact, normal mood and affect, answers questions appropriately      I have reviewed my findings and recommendations with Nelly Cohen.     Abebe Patten, SUZIE - CNP, NP-C, FNP-BC

## 2023-01-09 NOTE — ASSESSMENT & PLAN NOTE
reasonably well controlled, no significant medication side effects noted and he had been out of jardiance and trulicity for 1 week. Continue medications refills sent today, continue metformin  mg BID. Trulicity 1.5 mg weekly, Jardiance 25 mg daily  Monitor BS at different times: 1 day fasting, then next day 2 hours after lunch, next day 2 hours after dinner, next day at bedtime then start over and log all values.   Bring log to next appointment  Foot exam every day; wash and dry well between toes, look for any redness, cracks, wounds notify provider if any problems occur  Reminder for annual Eye exam  Reminder for Podiatry visits Q 2 Months for toenail care if needed  Reminder to keep vaccines up dated  Exercise 30 minutes daily  Recommend Diabetic Education Classes if you have not already attended

## 2023-01-09 NOTE — ASSESSMENT & PLAN NOTE
Gradually worsening, increase at atorvastatin 80 mg nightly and recheck labs in 8 weeks fasting. Liver US indicates fatty liver disease and ALT gradually worse than last time.

## 2023-01-09 NOTE — ASSESSMENT & PLAN NOTE
well controlled, no significant medication side effects noted and fasting labs ordered for 3 months  -Discussed taking medication and directed every day. -Discussed exercising daily 30 minutes 5 times a week for 150 minutes weekly.  -Discussed weight reduction if needed.  -Discussed low sodium diet.  -Discussed limiting caffeine consumption and tobacco cessation and the effects they have on the heart and blood pressure.

## 2023-01-09 NOTE — ASSESSMENT & PLAN NOTE
reasonably well controlled, no significant medication side effects noted and lipids not at goal will increase atorvastatin 80 mg nightly and repeat labs in 8 weeks  -Discussed low fat diet, limit fast food, goodies, breads and pastas if consuming several days a week,  limit any alcohol consumption.  -Discussed weight reduction and exercise 30 minutes 5 days a week for total of 150 minutes weekly.  -Discussed if any unusual muscle aching/pain to contact the office, discussed medication and risk of muscle pain/damage from Rhabdomyolysis. -Discussed repeat labs in 8 weeks.

## 2023-01-09 NOTE — TELEPHONE ENCOUNTER
Call to Shankar Vanegas that procedure was approved for 1/16/2023 and that [facility] should call him a few days before for the pre op call and after 3:00 PM the business day before with the arrival time. Instructed to call office back if any questions. Rohan verbalized understanding.     Electronically signed by Carissa Kilgore RN on 1/9/2023 at 1:53 PM

## 2023-01-09 NOTE — LETTER
[address] [place] [id number]  Phone: [phone]  Fax: [phone], APRN - CNP        January 9, 2023     Patient: Domingo Turpin   YOB: 1960   Date of Visit: 1/9/2023       To Whom it May Concern:    Dequan Helms was seen in my clinic on 1/9/2023. If you have any questions or concerns, please don't hesitate to call.     Sincerely,         Nick Hernandez, SUZIE - CNP

## 2023-01-11 ENCOUNTER — ANESTHESIA EVENT (OUTPATIENT)
Dept: OPERATING ROOM | Age: 63
End: 2023-01-11
Payer: COMMERCIAL

## 2023-01-13 ASSESSMENT — LIFESTYLE VARIABLES: SMOKING_STATUS: 0

## 2023-01-13 NOTE — ANESTHESIA PRE PROCEDURE
Department of Anesthesiology  Preprocedure Note       Name:  Gaye Jerez   Age:  58 y.o.  :  1960                                          MRN:  [id number]         Date:  2023      Surgeon: Vasiliy Alvarez):  Mesha Funk MD    Procedure: Procedure(s):  BILATERAL L3,4,5 MEDIAL BRANCH BLOCK    Medications prior to admission:   Prior to Admission medications    Medication Sig Start Date End Date Taking? Authorizing Provider   Omega-3 Fatty Acids (FISH OIL) 1000 MG capsule Take by mouth daily   Yes Historical Provider, MD   empagliflozin (JARDIANCE) 25 MG tablet Take 1 tablet by mouth daily 23   SUZIE Garcia CNP   dulaglutide (TRULICITY) 1.5 QJ/0.0LV SC injection Inject 0.5 mLs into the skin once a week 23   SUZIE Garcia CNP   atorvastatin (LIPITOR) 80 MG tablet Take 1 tablet by mouth at bedtime 23   SUZIE Garcia CNP   atenolol (TENORMIN) 25 MG tablet TAKE ONE TABLET BY MOUTH ONCE A DAY. 23   SUZIE Garcia CNP   lisinopril (PRINIVIL;ZESTRIL) 10 MG tablet Take 1 tablet by mouth daily 23   SUZIE Garcia CNP   metFORMIN (GLUCOPHAGE-XR) 500 MG extended release tablet Take 1 tablet by mouth 2 times daily (with meals) 23   SUZIE Garcia CNP   fluticasone (FLONASE) 50 MCG/ACT nasal spray 1 spray by Nasal route daily 10/3/22   SUZIE Garcia CNP   tamsulosin (FLOMAX) 0.4 MG capsule Take 1 capsule by mouth daily  Patient not taking: Reported on 2023 10/3/22   SUZIE Garcia CNP   blood glucose monitor strips Test 1 times a day & as needed for symptoms of irregular blood glucose.  22   SUZIE Gacria CNP   Lancets MISC 1 each by Does not apply route daily 22   SUZIE Garcia CNP   albuterol sulfate HFA (VENTOLIN HFA) 108 (90 Base) MCG/ACT inhaler Inhale 2 puffs into the lungs 4 times daily as needed for Wheezing 22   Elvis Dorman DO   nitroGLYCERIN (NITROSTAT) 0.4 MG SL tablet DISSOLVE 1 TABLET UNDER THE TONGUE AS NEEDED 7/13/21   Historical Provider, MD   hydrocortisone 2.5 % cream Apply topically 2 times daily to affected areas of skin. 6/8/21   Kody Persaud PA-C   Blood Glucose Monitoring Suppl [facility] BLOOD GLUCOSE METER) w/Device KIT 1 Device by Does not apply route daily 12/8/20   Alexy Restrepo, APRN - CNP   Multiple Vitamin (MULTI-VITAMIN) TABS Take 1 tablet by mouth Daily. Historical Provider, MD       Current medications:    No current facility-administered medications for this encounter. Current Outpatient Medications   Medication Sig Dispense Refill    Omega-3 Fatty Acids (FISH OIL) 1000 MG capsule Take by mouth daily      empagliflozin (JARDIANCE) 25 MG tablet Take 1 tablet by mouth daily 30 tablet 6    dulaglutide (TRULICITY) 1.5 MI/2.3GJ SC injection Inject 0.5 mLs into the skin once a week 4 Adjustable Dose Pre-filled Pen Syringe 12    atorvastatin (LIPITOR) 80 MG tablet Take 1 tablet by mouth at bedtime 90 tablet 0    atenolol (TENORMIN) 25 MG tablet TAKE ONE TABLET BY MOUTH ONCE A DAY. 90 tablet 1    lisinopril (PRINIVIL;ZESTRIL) 10 MG tablet Take 1 tablet by mouth daily 90 tablet 1    metFORMIN (GLUCOPHAGE-XR) 500 MG extended release tablet Take 1 tablet by mouth 2 times daily (with meals) 180 tablet 1    fluticasone (FLONASE) 50 MCG/ACT nasal spray 1 spray by Nasal route daily 3 each 3    tamsulosin (FLOMAX) 0.4 MG capsule Take 1 capsule by mouth daily (Patient not taking: Reported on 1/9/2023) 90 capsule 3    blood glucose monitor strips Test 1 times a day & as needed for symptoms of irregular blood glucose.  100 strip 3    Lancets MISC 1 each by Does not apply route daily 100 each 5    albuterol sulfate HFA (VENTOLIN HFA) 108 (90 Base) MCG/ACT inhaler Inhale 2 puffs into the lungs 4 times daily as needed for Wheezing 18 g 0    nitroGLYCERIN (NITROSTAT) 0.4 MG SL tablet DISSOLVE 1 TABLET UNDER THE TONGUE AS NEEDED      hydrocortisone 2.5 % cream Apply topically 2 times daily to affected areas of skin. 28 g 1    Blood Glucose Monitoring Suppl (ACURA BLOOD GLUCOSE METER) w/Device KIT 1 Device by Does not apply route daily 1 kit 0    Multiple Vitamin (MULTI-VITAMIN) TABS Take 1 tablet by mouth Daily. Allergies:     Allergies   Allergen Reactions    Benadryl [Diphenhydramine]      Felt weird    Lopid [Gemfibrozil] Other (See Comments)     Enlarged prostate       Problem List:    Patient Active Problem List   Diagnosis Code    Chronic left hip pain M25.552, G89.29    Type 2 diabetes mellitus with stage 2 chronic kidney disease, without long-term current use of insulin ([facility]) E11.22, N18.2    Benign prostatic hyperplasia with weak urinary stream N40.1, R39.12    Mass of skin of shoulder R22.30    Pain of right knee and lower leg M25.561, M79.661    Mixed hyperlipidemia E78.2    Essential hypertension I10    Somatic dysfunction of back M99.09    Low back pain radiating to left lower extremity M54.50, M79.605    Bone callus M25.70    Exostosis of toe M89.8X7    Chronic pain syndrome G89.4    Lumbar disc disorder M51.9    Lumbar facet arthropathy M47.816    Lumbar radiculopathy M54.16    Lumbar spondylosis M47.816    Stenosis of lateral recess of lumbar spine M48.061    Foraminal stenosis of lumbar region M48.061    Right sciatic nerve pain M54.31    Benign paroxysmal positional vertigo due to bilateral vestibular disorder H81.13    Needs flu shot Z23    Impacted cerumen of right ear H61.21    Family history of clotting disorder Z80.2    BPH with obstruction/lower urinary tract symptoms N40.1, N13.8    Elevated ALT measurement R74.01    Allergic rhinitis due to allergen J30.9    NAFLD (nonalcoholic fatty liver disease) K76.0       Past Medical History:        Diagnosis Date    Allergic rhinitis due to allergen 10/03/2022    Back skin lesion 05/01/2017    BPPV (benign paroxysmal positional vertigo), bilateral 09/27/2021    Cervical radiculopathy     Bilateral upper extemities    Corns and callus 2020    COVID 2021    mild    Degeneration, intervertebral disc, cervical     C4-C5 through C6-C7 levels    Diabetes mellitus ([facility] [address]     Headache(784.0)     Hyperlipidemia     Hypertension     Low back pain radiating to left lower extremity 2020    NAFLD (nonalcoholic fatty liver disease) 2023    Peptic ulcer 2020    Pressure in left side of chest 2021    Spinal stenosis     Congenital     Type 2 diabetes mellitus without complication, without long-term current use of insulin ([facility] [address] 08/15/2016    Urinary frequency 2022       Past Surgical History:        Procedure Laterality Date    ANUS SURGERY  2013    Excision Rianna-anal condyloma, anoscopy    BACK SURGERY      epidural    CHOLECYSTECTOMY, LAPAROSCOPIC      LYMPH NODE BIOPSY      benign    NERVE BLOCK Left 2020    LEFT L4 AND L5 TRANSFORAMINAL EPIDURAL STEROID INJECTION (CPT 06915) performed by Corrie Shelton MD at [facility] [place] 2021    TFESI    NERVE BLOCK Left 2021    LEFT L4 AND L5 TRANSFORAMINAL EPIDURAL STEROID INJECTION WITH SEDATION (CPT 99841) performed by Corrie Shelton MD at [address] [place] [address] History:    Social History     Tobacco Use    Smoking status: Former     Packs/day: 1.50     Years: 25.00     Pack years: 37.50     Types: Cigarettes     Quit date: 2002     Years since quittin.9    Smokeless tobacco: Never   Substance Use Topics    Alcohol use: Yes     Comment: Social                                 Counseling given: Not Answered      Vital Signs (Current):   Vitals:    23 1407   Weight: 196 lb (88.9 kg)   Height: 5' 7.5\" (1.715 m)                                              BP Readings from Last 3 Encounters:   23 122/68   22 (!) 142/80   10/03/22 108/78       NPO Status:  >8.H BMI:   Wt Readings from Last 3 Encounters:   01/09/23 196 lb (88.9 kg)   01/09/23 196 lb (88.9 kg)   12/16/22 197 lb (89.4 kg)     Body mass index is 30.24 kg/m². CBC:   Lab Results   Component Value Date/Time    WBC 6.7 09/26/2022 08:44 AM    RBC 5.11 09/26/2022 08:44 AM    HGB 16.2 09/26/2022 08:44 AM    HCT 47.4 09/26/2022 08:44 AM    MCV 92.8 09/26/2022 08:44 AM    RDW 12.4 09/26/2022 08:44 AM     09/26/2022 08:44 AM       CMP:   Lab Results   Component Value Date/Time     09/26/2022 08:44 AM    K 4.4 09/26/2022 08:44 AM     09/26/2022 08:44 AM    CO2 20 09/26/2022 08:44 AM    BUN 15 09/26/2022 08:44 AM    CREATININE 1.3 09/26/2022 08:44 AM    GFRAA >60 09/26/2022 08:44 AM    LABGLOM 56 09/26/2022 08:44 AM    GLUCOSE 272 01/09/2023 07:11 AM    GLUCOSE 131 04/10/2012 08:49 AM    PROT 7.4 09/26/2022 08:44 AM    CALCIUM 9.2 09/26/2022 08:44 AM    BILITOT 0.5 09/26/2022 08:44 AM    ALKPHOS 96 09/26/2022 08:44 AM    AST 34 09/26/2022 08:44 AM    ALT 58 09/26/2022 08:44 AM       POC Tests: No results for input(s): POCGLU, POCNA, POCK, POCCL, POCBUN, POCHEMO, POCHCT in the last 72 hours.     Coags:   Lab Results   Component Value Date/Time    PROTIME 12.7 01/31/2013 02:35 PM    INR 1.1 01/31/2013 02:35 PM    APTT 25.2 01/31/2013 02:35 PM       HCG (If Applicable): No results found for: PREGTESTUR, PREGSERUM, HCG, HCGQUANT     ABGs: No results found for: PHART, PO2ART, PES9GPU, LAD6YJG, BEART, N9IEJEEV     Type & Screen (If Applicable):  No results found for: LABABO, LABRH    Drug/Infectious Status (If Applicable):  No results found for: HIV, HEPCAB    COVID-19 Screening (If Applicable):   Lab Results   Component Value Date/Time    COVID19 Not Detected 05/23/2022 01:40 PM    COVID19 Not Detected 01/27/2021 07:01 AM           Anesthesia Evaluation  Patient summary reviewed no history of anesthetic complications:   Airway: Mallampati: II  TM distance: >3 FB   Neck ROM: full  Mouth opening: > = 3 FB   Dental:    (+) caps      Pulmonary: breath sounds clear to auscultation      (-) not a current smoker (ex 37.5 pk yrs)                           Cardiovascular:  Exercise tolerance: good (>4 METS),   (+) hypertension:, hyperlipidemia        Rhythm: regular  Rate: normal           Beta Blocker:  Dose within 24 Hrs         Neuro/Psych:   (+) neuromuscular disease:, headaches:,              ROS comment: Benign vertigo GI/Hepatic/Renal:   (+) PUD, liver disease:,          ROS comment: BPH.   Endo/Other:    (+) DiabetesType II DM, , .                  ROS comment: mild Covid 12/2021 Abdominal:         (-) obese       Vascular:          Other Findings:           Anesthesia Plan      MAC     ASA 3       Induction: intravenous.      Anesthetic plan and risks discussed with patient.      Plan discussed with CRNA.                    Asad Saenz MD   1/13/2023

## 2023-01-16 ENCOUNTER — HOSPITAL ENCOUNTER (OUTPATIENT)
Age: 63
Setting detail: OUTPATIENT SURGERY
Discharge: HOME | End: 2023-01-16
Attending: PAIN MEDICINE | Admitting: PAIN MEDICINE
Payer: COMMERCIAL

## 2023-01-16 ENCOUNTER — ANESTHESIA (OUTPATIENT)
Dept: OPERATING ROOM | Age: 63
End: 2023-01-16
Payer: COMMERCIAL

## 2023-01-16 ENCOUNTER — HOSPITAL ENCOUNTER (OUTPATIENT)
Dept: OPERATING ROOM | Age: 63
Setting detail: OUTPATIENT SURGERY
Discharge: HOME | End: 2023-01-16
Attending: PAIN MEDICINE
Payer: COMMERCIAL

## 2023-01-16 VITALS
HEIGHT: 68 IN | DIASTOLIC BLOOD PRESSURE: 66 MMHG | OXYGEN SATURATION: 97 % | RESPIRATION RATE: 18 BRPM | TEMPERATURE: 98.3 F | BODY MASS INDEX: 28.95 KG/M2 | SYSTOLIC BLOOD PRESSURE: 135 MMHG | WEIGHT: 191 LBS | HEART RATE: 66 BPM

## 2023-01-16 DIAGNOSIS — M47.896 OTHER OSTEOARTHRITIS OF SPINE, LUMBAR REGION: ICD-10-CM

## 2023-01-16 LAB — METER GLUCOSE: 124 MG/DL (ref 74–99)

## 2023-01-16 PROCEDURE — 82962 GLUCOSE BLOOD TEST: CPT

## 2023-01-16 PROCEDURE — 64493 INJ PARAVERT F JNT L/S 1 LEV: CPT | Performed by: PAIN MEDICINE

## 2023-01-16 PROCEDURE — 2709999900 HC NON-CHARGEABLE SUPPLY: Performed by: PAIN MEDICINE

## 2023-01-16 PROCEDURE — 2500000003 HC RX 250 WO HCPCS: Performed by: PAIN MEDICINE

## 2023-01-16 PROCEDURE — 2580000003 HC RX 258: Performed by: ANESTHESIOLOGY

## 2023-01-16 PROCEDURE — 3209999900 FLUORO FOR SURGICAL PROCEDURES

## 2023-01-16 PROCEDURE — 7100000010 HC PHASE II RECOVERY - FIRST 15 MIN: Performed by: PAIN MEDICINE

## 2023-01-16 PROCEDURE — 3600000005 HC SURGERY LEVEL 5 BASE: Performed by: PAIN MEDICINE

## 2023-01-16 PROCEDURE — 7100000011 HC PHASE II RECOVERY - ADDTL 15 MIN: Performed by: PAIN MEDICINE

## 2023-01-16 PROCEDURE — 6360000002 HC RX W HCPCS: Performed by: PAIN MEDICINE

## 2023-01-16 PROCEDURE — 3700000000 HC ANESTHESIA ATTENDED CARE: Performed by: PAIN MEDICINE

## 2023-01-16 PROCEDURE — 82962 GLUCOSE BLOOD TEST: CPT | Performed by: PAIN MEDICINE

## 2023-01-16 PROCEDURE — 64494 INJ PARAVERT F JNT L/S 2 LEV: CPT | Performed by: PAIN MEDICINE

## 2023-01-16 PROCEDURE — 6360000002 HC RX W HCPCS: Performed by: NURSE ANESTHETIST, CERTIFIED REGISTERED

## 2023-01-16 RX ORDER — SODIUM CHLORIDE 0.9 % (FLUSH) 0.9 %
5-40 SYRINGE (ML) INJECTION PRN
Status: DISCONTINUED | OUTPATIENT
Start: 2023-01-16 | End: 2023-01-16 | Stop reason: HOSPADM

## 2023-01-16 RX ORDER — SODIUM CHLORIDE 0.9 % (FLUSH) 0.9 %
5-40 SYRINGE (ML) INJECTION EVERY 12 HOURS SCHEDULED
Status: DISCONTINUED | OUTPATIENT
Start: 2023-01-16 | End: 2023-01-16 | Stop reason: HOSPADM

## 2023-01-16 RX ORDER — PROPOFOL 10 MG/ML
INJECTION, EMULSION INTRAVENOUS PRN
Status: DISCONTINUED | OUTPATIENT
Start: 2023-01-16 | End: 2023-01-16 | Stop reason: SDUPTHER

## 2023-01-16 RX ORDER — MIDAZOLAM HYDROCHLORIDE 1 MG/ML
INJECTION INTRAMUSCULAR; INTRAVENOUS PRN
Status: DISCONTINUED | OUTPATIENT
Start: 2023-01-16 | End: 2023-01-16 | Stop reason: SDUPTHER

## 2023-01-16 RX ORDER — PROCHLORPERAZINE EDISYLATE 5 MG/ML
5 INJECTION INTRAMUSCULAR; INTRAVENOUS
Status: DISCONTINUED | OUTPATIENT
Start: 2023-01-16 | End: 2023-01-16 | Stop reason: HOSPADM

## 2023-01-16 RX ORDER — FENTANYL CITRATE 50 UG/ML
INJECTION, SOLUTION INTRAMUSCULAR; INTRAVENOUS PRN
Status: DISCONTINUED | OUTPATIENT
Start: 2023-01-16 | End: 2023-01-16 | Stop reason: SDUPTHER

## 2023-01-16 RX ORDER — LIDOCAINE HYDROCHLORIDE 5 MG/ML
INJECTION, SOLUTION INFILTRATION; INTRAVENOUS PRN
Status: DISCONTINUED | OUTPATIENT
Start: 2023-01-16 | End: 2023-01-16 | Stop reason: ALTCHOICE

## 2023-01-16 RX ORDER — SODIUM CHLORIDE 9 MG/ML
INJECTION, SOLUTION INTRAVENOUS PRN
Status: DISCONTINUED | OUTPATIENT
Start: 2023-01-16 | End: 2023-01-16 | Stop reason: HOSPADM

## 2023-01-16 RX ORDER — SODIUM CHLORIDE, SODIUM LACTATE, POTASSIUM CHLORIDE, CALCIUM CHLORIDE 600; 310; 30; 20 MG/100ML; MG/100ML; MG/100ML; MG/100ML
INJECTION, SOLUTION INTRAVENOUS CONTINUOUS
Status: DISCONTINUED | OUTPATIENT
Start: 2023-01-16 | End: 2023-01-16 | Stop reason: HOSPADM

## 2023-01-16 RX ORDER — MEPERIDINE HYDROCHLORIDE 25 MG/ML
12.5 INJECTION INTRAMUSCULAR; INTRAVENOUS; SUBCUTANEOUS ONCE
Status: DISCONTINUED | OUTPATIENT
Start: 2023-01-16 | End: 2023-01-16 | Stop reason: HOSPADM

## 2023-01-16 RX ADMIN — SODIUM CHLORIDE, POTASSIUM CHLORIDE, SODIUM LACTATE AND CALCIUM CHLORIDE: 600; 310; 30; 20 INJECTION, SOLUTION INTRAVENOUS at 12:53

## 2023-01-16 RX ADMIN — PROPOFOL 20 MG: 10 INJECTION, EMULSION INTRAVENOUS at 13:31

## 2023-01-16 RX ADMIN — MIDAZOLAM 2 MG: 1 INJECTION INTRAMUSCULAR; INTRAVENOUS at 13:29

## 2023-01-16 RX ADMIN — FENTANYL CITRATE 50 MCG: 50 INJECTION INTRAMUSCULAR; INTRAVENOUS at 13:31

## 2023-01-16 RX ADMIN — FENTANYL CITRATE 50 MCG: 50 INJECTION INTRAMUSCULAR; INTRAVENOUS at 13:30

## 2023-01-16 ASSESSMENT — PAIN SCALES - GENERAL
PAINLEVEL_OUTOF10: 0

## 2023-01-16 ASSESSMENT — PAIN - FUNCTIONAL ASSESSMENT: PAIN_FUNCTIONAL_ASSESSMENT: 0-10

## 2023-01-16 ASSESSMENT — PAIN DESCRIPTION - DESCRIPTORS: DESCRIPTORS: ACHING

## 2023-01-16 NOTE — DISCHARGE INSTRUCTIONS
[facility]) Pain Management Department  [phone]   Post-Pain Block/ Radiofrequency Home Going Instructions    1-Go home, rest for the remainder of the day    2-Please do not lift over 20 pounds the day of the injection    3-If you received sedation No: alcohol, driving, operating lawn mowers, plows, tractors or other dangerous equipment until next morning. Do not make important decisions or sign legal documents for 24 hours. You may experience light headedness, dizziness, nausea or sleepiness after sedation. Do not stay alone. A responsible adult must be with you for 24 hours. You could be nauseated from the medications you have received. Your IV site may be sore and bruised. 4-No dietary restrictions     5-Resume all medications the same day, blood thinners to be resumed 24 hours after injection    6-Keep the surgical site clean and dry, you may shower the next morning and remove the      dressing. 7- No sitz baths, tub baths or hot tubs/swimming for 24 hours. 8- If you have any pain at the injection site(s), application of an ice pack to the area should be       helpful, 20 minutes on/20 minutes off for next 48 hours. 9- [facility] if you develop. Fever greater than 100.4 F  Have bleeding or drainage from the puncture site  Have progressive Leg/arm numbness and or weakness  Loss of control of bowel and or bladder (wet/soil yourself)  Severe headache with inability to lift head     10-You may return to work the next day          Infection After Surgery: Care Instructions  Overview  After surgery, an infection is always possible. It doesn't mean that the surgery didn't go well. Because an infection can be serious, your doctor has taken steps to manage it. Your doctor checked the infection and cleaned it if necessary. Your doctor may have made an opening in the area so that the pus can drain out.  You may have gauze in the cut so that the area will stay open and keep draining. You may need antibiotics. You will need to follow up with your doctor to make sure the infection has gone away. Follow-up care is a key part of your treatment and safety. Be sure to make and go to all appointments, and call your doctor if you are having problems. It's also a good idea to know your test results and keep a list of the medicines you take. How can you care for yourself at home? Make sure your surgeon knows about the infection, especially if you saw another doctor about your symptoms. If your doctor prescribed antibiotics, take them as directed. Do not stop taking them just because you feel better. You need to take the full course of antibiotics. Ask your doctor if you can take an over-the-counter pain medicine, such as acetaminophen (Tylenol), ibuprofen (Advil, Motrin), or naproxen (Aleve). Be safe with medicines. Read and follow all instructions on the label. Do not take two or more pain medicines at the same time unless the doctor told you to. Many pain medicines have acetaminophen, which is Tylenol. Too much acetaminophen (Tylenol) can be harmful. Prop up the area on a pillow anytime you sit or lie down during the next 3 days. Try to keep it above the level of your heart. This will help reduce swelling. Keep the skin clean and dry. You may have a bandage over the cut (incision). A bandage helps the incision heal and protects it. Your doctor will tell you how to take care of this. Keep it clean and dry. You may have drainage from the wound. If your doctor told you how to care for your incision, follow your doctor's instructions. If you did not get instructions, follow this general advice:  Wash around the incision with clean water 2 times a day. Don't use hydrogen peroxide or alcohol, which can slow healing. When should you call for help? Call your doctor now or seek immediate medical care if:    You have signs that your infection is getting worse, such as:   Increased pain, swelling, warmth, or redness in the area. Red streaks leading from the area. Pus draining from the wound. A new or higher fever. Watch closely for changes in your health, and be sure to contact your doctor if you have any problems. Where can you learn more? Go to http://www.woods.com/ and enter C340 to learn more about \"Infection After Surgery: Care Instructions. \"  Current as of: January 20, 2022               Content Version: 13.5  © 2006-2022 [employer] Care instructions adapted under license by [facility] ([facility]). If you have questions about a medical condition or this instruction, always ask your healthcare professional. [place] [address] any warranty or liability for your use of this information. Nausea and Vomiting After Surgery: Care Instructions  Your Care Instructions     After you've had surgery, you may feel sick to your stomach (nauseated) or you may vomit. Sometimes anesthesia can make you feel sick. It's a common side effect and often doesn't last long. Pain also can make you feel sick or vomit. After the anesthesia wears off, you may feel pain from the incision (cut). That pain could then upset your stomach. Taking pain medicine can also make you feel sick to your stomach. Whatever the cause, you may get medicine that can help. There are also some things you can do at home to prevent nausea and feel better. The doctor has checked you carefully, but problems can develop later. If you notice any problems or new symptoms, get medical treatment right away. Follow-up care is a key part of your treatment and safety. Be sure to make and go to all appointments, and call your doctor if you are having problems. It's also a good idea to know your test results and keep a list of the medicines you take. How can you care for yourself at home? Be safe with medicines. Read and follow all instructions on the label.   If the doctor gave you a prescription medicine for pain, take it as prescribed. If you are not taking a prescription pain medicine, ask your doctor if you can take an over-the-counter medicine. Take your pain medicine as soon as you have pain. It works better if you take it before the pain gets bad. Call your doctor if you have any problems with your medicine. Rest in bed until you feel better. To prevent dehydration, drink plenty of fluids. Choose water and other clear liquids until you feel better. If you have kidney, heart, or liver disease and have to limit fluids, talk with your doctor before you increase the amount of fluids you drink. When you are able to eat, try clear soups, mild foods, and liquids until all symptoms are gone for 12 to 48 hours. Other good choices include dry toast, crackers, cooked cereal, and gelatin dessert, such as Jell-O. Do not smoke. Smoking and being around smoke can make nausea worse. If you need help quitting, talk to your doctor about stop-smoking programs and medicines. These can increase your chances of quitting for good. When should you call for help? Call 911  anytime you think you may need emergency care. For example, call if:    You passed out (lost consciousness). Call your doctor now or seek immediate medical care if:    You have new or worse nausea or vomiting. You are too sick to your stomach to drink any fluids. You cannot keep down fluids. You have symptoms of dehydration, such as:  Dry eyes and a dry mouth. Passing only a little urine. Feeling thirstier than usual.     Your pain medicine is not helping. You are dizzy or lightheaded, or you feel like you may faint. Watch closely for changes in your health, and be sure to contact your doctor if:    You do not get better as expected. Current as of: March 9, 2022               Content Version: 13.5  © 2255-9321 [employer], [employer]. Care instructions adapted under license by [address]  If you have questions about a medical condition or this instruction, always ask your healthcare professional. [place] [address] any warranty or liability for your use of this information.

## 2023-01-16 NOTE — OP NOTE
2023    Patient: Sj Dailey  :  1960  Age:  58 y.o. Sex:  male     PRE-OPERATIVE DIAGNOSIS: Bilateral Lumbar spondylosis, lumbar facet syndrome. POST-OPERATIVE DIAGNOSIS: Same. PROCEDURE:  # 1 Fluoroscopic guided lumbar medial branch blocks Bilateral at Levels: L3, L4, L5 MB. SURGEON: JOHN Loya M.D. ANESTHESIA: MAC    ESTIMATED BLOOD LOSS: None.  ______________________________________________________________________  BRIEF HISTORY:  Sj Dailey comes in today for 1 fluoroscopic guided lumbar medial branch blocks  Bilateral  at Levels: L3, L4, L5 MBB. The potential complications of this procedure were discussed with him again today. He has elected to undergo the aforementioned procedure. Johnny complete History & Physical examination were reviewed in depth, a copy of which is in the chart. DESCRIPTION OF PROCEDURE:   After confirming written and informed consent, a time-out was performed and Johnny name and date of birth, the procedure to be performed as well as the plan for the location of the needle insertion were confirmed. The patient was brought into the procedure room and placed in the prone position on the fluoroscopy table. Standard monitors were placed and vital signs were observed throughout the procedure. The area of the lumbar spine was prepped with chloraprep and draped in a sterile manner. AP fluoroscopy was used to identify and denys bartons point at the targeted levels. The skin and subcutaneous tissues in these identified areas were anesthetized with 0.5%Lidocaine. A 22 # gauge 5 inch spinal needle was advanced toward the junction of the superior articular process and the transverse process, along the course of the medial branch. Satisfactory needle placement was confirmed by AP and oblique projections.   At the sacral alar level, the sacral alar region was visualized and the needle tip was positioned on the sacral alar at the base of the superior articulating process where the medial branch traverses under fluoroscopic guidance. Once bone was contacted and negative aspiration was confirmed. A solution of 0.25% marcaine with 40 DepoMedrol 3 cc was then injected and distributed equally at each level. Following the procedure Rohan noted improvement of previous pain symptoms. Disposition the patient tolerated the procedure well and there were no complications . Vital signs remained stable throughout the procedure. The patient was escorted to the recovery area where they remained until discharge and written discharge instructions for the procedure were given. Plan: Guido Hill will return to our pain management center as scheduled.      Chad Dinh MD

## 2023-01-16 NOTE — H&P
[address]  [facility], [address] [place] [address] Chris  [phone]    Procedure History & Physical      Yandel Lavelle     HPI:    Patient  is here for axial low back pain for bilateral L3,4,5 MBB  Labs/imaging studies reviewed   All question and concerns addressed including R/B/A associated with the procedure    Past Medical History:   Diagnosis Date    Allergic rhinitis due to allergen 10/03/2022    Back skin lesion 05/01/2017    BPPV (benign paroxysmal positional vertigo), bilateral 09/27/2021    Cervical radiculopathy     Bilateral upper extemities    Corns and callus 07/09/2020    COVID 12/2021    mild    Degeneration, intervertebral disc, cervical     C4-C5 through C6-C7 levels    Diabetes mellitus ([address]     Headache(784.0)     Hyperlipidemia     Hypertension     Low back pain radiating to left lower extremity 02/17/2020    NAFLD (nonalcoholic fatty liver disease) 01/09/2023    Peptic ulcer 01/21/2020    Pressure in left side of chest 06/18/2021    Spinal stenosis     Congenital     Type 2 diabetes mellitus without complication, without long-term current use of insulin ([address] 08/15/2016    Urinary frequency 02/25/2022       Past Surgical History:   Procedure Laterality Date    ANUS SURGERY  Feb 2013    Excision Rianna-anal condyloma, anoscopy    BACK SURGERY      epidural    CHOLECYSTECTOMY, LAPAROSCOPIC  2010    LYMPH NODE BIOPSY      benign    NERVE BLOCK Left 12/14/2020    LEFT L4 AND L5 TRANSFORAMINAL EPIDURAL STEROID INJECTION (CPT 88222) performed by Amelia Pickens MD at [facility] Left 02/01/2021    TFESI    NERVE BLOCK Left 2/1/2021    LEFT L4 AND L5 TRANSFORAMINAL EPIDURAL STEROID INJECTION WITH SEDATION (CPT 36111) performed by Amelia Pickens MD at [address] [place] [address]       Prior to Admission medications    Medication Sig Start Date End Date Taking?  Authorizing Provider   Omega-3 Fatty Acids (FISH OIL) 1000 MG capsule Take by mouth daily   Yes Historical Provider, MD   empagliflozin (JARDIANCE) 25 MG tablet Take 1 tablet by mouth daily 1/9/23   SUZIE Khan CNP   dulaglutide (TRULICITY) 1.5 PE/3.3XB SC injection Inject 0.5 mLs into the skin once a week 1/9/23   SUZIE Khan CNP   atorvastatin (LIPITOR) 80 MG tablet Take 1 tablet by mouth at bedtime 1/9/23   SUZIE Khan CNP   atenolol (TENORMIN) 25 MG tablet TAKE ONE TABLET BY MOUTH ONCE A DAY. 1/9/23   SUZIE Khan CNP   lisinopril (PRINIVIL;ZESTRIL) 10 MG tablet Take 1 tablet by mouth daily 1/9/23   SUZIE Khan CNP   metFORMIN (GLUCOPHAGE-XR) 500 MG extended release tablet Take 1 tablet by mouth 2 times daily (with meals) 1/9/23   SUZIE Khan CNP   fluticasone (FLONASE) 50 MCG/ACT nasal spray 1 spray by Nasal route daily 10/3/22   SUZIE Khan CNP   tamsulosin (FLOMAX) 0.4 MG capsule Take 1 capsule by mouth daily  Patient not taking: Reported on 1/9/2023 10/3/22   SUZIE Khan CNP   blood glucose monitor strips Test 1 times a day & as needed for symptoms of irregular blood glucose. 6/2/22   SUZIE Khan CNP   Lancets MISC 1 each by Does not apply route daily 6/2/22   SUZIE Khan CNP   albuterol sulfate HFA (VENTOLIN HFA) 108 (90 Base) MCG/ACT inhaler Inhale 2 puffs into the lungs 4 times daily as needed for Wheezing 5/23/22   Elvis Dorman DO   nitroGLYCERIN (NITROSTAT) 0.4 MG SL tablet DISSOLVE 1 TABLET UNDER THE TONGUE AS NEEDED 7/13/21   Historical Provider, MD   hydrocortisone 2.5 % cream Apply topically 2 times daily to affected areas of skin. 6/8/21   Kody Persaud PA-C   Blood Glucose Monitoring Suppl [facility] BLOOD GLUCOSE METER) w/Device KIT 1 Device by Does not apply route daily 12/8/20   Gaines Sarmad, APRN - CNP   Multiple Vitamin (MULTI-VITAMIN) TABS Take 1 tablet by mouth Daily.     Historical Provider, MD       Allergies   Allergen Reactions    Benadryl [Diphenhydramine]      Felt weird Lopid [Gemfibrozil] Other (See Comments)     Enlarged prostate       Social History     Socioeconomic History    Marital status:      Spouse name: Not on file    Number of children: Not on file    Years of education: Not on file    Highest education level: Not on file   Occupational History    Not on file   Tobacco Use    Smoking status: Former     Packs/day: 1.50     Years: 25.00     Pack years: 37.50     Types: Cigarettes     Quit date: 2002     Years since quittin.9    Smokeless tobacco: Never   Vaping Use    Vaping Use: Never used   Substance and Sexual Activity    Alcohol use: Yes     Comment: Social     Drug use: No    Sexual activity: Yes     Partners: Female   Other Topics Concern    Not on file   Social History Narrative    Not on file     Social Determinants of Health     Financial Resource Strain: Low Risk     Difficulty of Paying Living Expenses: Not hard at all   Food Insecurity: No Food Insecurity    Worried About Running Out of Food in the Last Year: Never true    Ran Out of Food in the Last Year: Never true   Transportation Needs: Not on file   Physical Activity: Not on file   Stress: Not on file   Social Connections: Not on file   Intimate Partner Violence: Not on file   Housing Stability: Not on file       Family History   Problem Relation Age of Onset    Hypertension Mother     Hypertension Father     Cancer Father         Prostate    Heart Surgery Father     Heart Disease Father     High Cholesterol Sister     High Cholesterol Brother     Heart Disease Brother          REVIEW OF SYSTEMS:    CONSTITUTIONAL:  negative for  fevers, chills, sweats and fatigue    RESPIRATORY:  negative for  dry cough, cough with sputum, dyspnea, wheezing and chest pain    CARDIOVASCULAR:  negative for chest pain, dyspnea, palpitations, syncope    GASTROINTESTINAL:  negative for nausea, vomiting, change in bowel habits, diarrhea, constipation and abdominal pain    MUSCULOSKELETAL: negative for muscle weakness    SKIN: negative for itching or rashes. BEHAVIOR/PSYCH:  negative for poor appetite, increased appetite, decreased sleep and poor concentration    All other systems negative      PHYSICAL EXAM:    VITALS:  /74   Pulse 70   Temp 98.3 °F (36.8 °C) (Temporal)   Resp 18   Ht 5' 7.5\" (1.715 m)   Wt 191 lb (86.6 kg)   SpO2 97%   BMI 29.47 kg/m²     CONSTITUTIONAL:  awake, alert, cooperative, no apparent distress, and appears stated age    EYES: PERRLA, EOMI    LUNGS:  No increased work of breathing, no audible wheezing    CARDIOVASCULAR:  regular rate and rhythm    ABDOMEN:  Soft non tender non distended     EXTREMITIES: no signs of clubbing or cyanosis. MUSCULOSKELETAL: negative for flaccid muscle tone or spastic movements. SKIN: gross examination reveals no signs of rashes, or diaphoresis. NEURO: Cranial nerves II-XII grossly intact. No signs of agitated mood. Assessment/Plan:    Axial low back pain for bilateral L3,4,5 MBB.

## 2023-01-16 NOTE — ANESTHESIA POSTPROCEDURE EVALUATION
Department of Anesthesiology  Postprocedure Note    Patient: Abdirashid Baum  MRN: [id number]  YOB: 1960  Date of evaluation: 1/16/2023      Procedure Summary     Date: 01/16/23 Room / Location: [address] [place] [address] 04 / [facility]    Anesthesia Start: 6550 Anesthesia Stop: 1339    Procedure: BILATERAL L3,4,5 MEDIAL BRANCH BLOCK (Bilateral) Diagnosis:       Lumbar spondylosis      (Lumbar spondylosis [R27.994])    Surgeons: Ai Salas MD Responsible Provider: Jalen Carlisle MD    Anesthesia Type: MAC ASA Status: 3          Anesthesia Type: MAC    Bradford Phase I: Bradford Score: 10    Bradford Phase II: Bradford Score: 10      Anesthesia Post Evaluation    Patient location during evaluation: PACU  Patient participation: complete - patient participated  Level of consciousness: awake  Airway patency: patent  Nausea & Vomiting: no nausea and no vomiting  Complications: no  Cardiovascular status: hemodynamically stable  Respiratory status: room air and spontaneous ventilation  Hydration status: stable

## 2023-01-27 ENCOUNTER — OFFICE VISIT (OUTPATIENT)
Dept: PAIN MANAGEMENT | Age: 63
End: 2023-01-27
Payer: COMMERCIAL

## 2023-01-27 VITALS
HEIGHT: 67 IN | HEART RATE: 68 BPM | OXYGEN SATURATION: 98 % | BODY MASS INDEX: 29.98 KG/M2 | WEIGHT: 191 LBS | RESPIRATION RATE: 18 BRPM | SYSTOLIC BLOOD PRESSURE: 128 MMHG | TEMPERATURE: 97.5 F | DIASTOLIC BLOOD PRESSURE: 82 MMHG

## 2023-01-27 DIAGNOSIS — M51.9 LUMBAR DISC DISORDER: ICD-10-CM

## 2023-01-27 DIAGNOSIS — M47.816 LUMBAR FACET ARTHROPATHY: ICD-10-CM

## 2023-01-27 DIAGNOSIS — M48.061 SPINAL STENOSIS OF LUMBAR REGION WITHOUT NEUROGENIC CLAUDICATION: ICD-10-CM

## 2023-01-27 DIAGNOSIS — M48.061 STENOSIS OF LATERAL RECESS OF LUMBAR SPINE: ICD-10-CM

## 2023-01-27 DIAGNOSIS — M48.061 FORAMINAL STENOSIS OF LUMBAR REGION: ICD-10-CM

## 2023-01-27 DIAGNOSIS — M47.816 LUMBAR SPONDYLOSIS: ICD-10-CM

## 2023-01-27 DIAGNOSIS — M54.16 LUMBAR RADICULOPATHY: ICD-10-CM

## 2023-01-27 DIAGNOSIS — G89.4 CHRONIC PAIN SYNDROME: Primary | ICD-10-CM

## 2023-01-27 PROCEDURE — G8484 FLU IMMUNIZE NO ADMIN: HCPCS | Performed by: PAIN MEDICINE

## 2023-01-27 PROCEDURE — 1036F TOBACCO NON-USER: CPT | Performed by: PAIN MEDICINE

## 2023-01-27 PROCEDURE — 99213 OFFICE O/P EST LOW 20 MIN: CPT | Performed by: PAIN MEDICINE

## 2023-01-27 PROCEDURE — 3079F DIAST BP 80-89 MM HG: CPT | Performed by: PAIN MEDICINE

## 2023-01-27 PROCEDURE — 3017F COLORECTAL CA SCREEN DOC REV: CPT | Performed by: PAIN MEDICINE

## 2023-01-27 PROCEDURE — G8427 DOCREV CUR MEDS BY ELIG CLIN: HCPCS | Performed by: PAIN MEDICINE

## 2023-01-27 PROCEDURE — G8417 CALC BMI ABV UP PARAM F/U: HCPCS | Performed by: PAIN MEDICINE

## 2023-01-27 PROCEDURE — 3074F SYST BP LT 130 MM HG: CPT | Performed by: PAIN MEDICINE

## 2023-01-27 RX ORDER — SODIUM CHLORIDE 9 MG/ML
INJECTION, SOLUTION INTRAVENOUS PRN
OUTPATIENT
Start: 2023-01-27

## 2023-01-27 RX ORDER — SODIUM CHLORIDE 0.9 % (FLUSH) 0.9 %
5-40 SYRINGE (ML) INJECTION PRN
OUTPATIENT
Start: 2023-01-27

## 2023-01-27 RX ORDER — SODIUM CHLORIDE 0.9 % (FLUSH) 0.9 %
5-40 SYRINGE (ML) INJECTION EVERY 12 HOURS SCHEDULED
OUTPATIENT
Start: 2023-01-27

## 2023-01-27 NOTE — PROGRESS NOTES
[address]  [address] [facility], [address] [place] [address] Chris  [phone]    Follow up Note      Forrestine Sames     Date of Visit:  1/27/2023    CC:  Patient presents for follow up   Chief Complaint   Patient presents with    Lower Back Pain       HPI:    Follow up on his low back pain with no acute issues  Appropriate analgesia with current medications regimen:N/A. Change in quality of symptoms:no. Medication side effects:not applicable . Recent diagnostic testing:none. Recent interventional procedures:bilateral L3,4,5 MBB with more than 80% improvement in his pain for 4 days     He has not been on anticoagulation medications to include none. The patient  has not been on herbal supplements. The patient is diabetic. Imaging:   Lumbar spine MRI 2020:  Degenerative disc disease as described above. Spinal canal narrowing, moderate at L4-5. Narrowing of the bilateral lateral recesses at L4-5, contacting left exiting    L4 and left descending L5 nerve roots, and contacting the right descending L5    nerve root. Foraminal narrowing, severe at left L5-S1, moderate to severe at right L5-S1,    moderate at bilateral L4-5, mild at left L3-4. Previous treatments: Physical Therapy and medications. .                                         Potential Aberrant Drug-Related Behavior:    No     Urine Drug Screening:  none     OARRS report:  01/2023 consistent    Opioid Agreement:  Renewal date:N/A    Past Medical History:   Diagnosis Date    Allergic rhinitis due to allergen 10/03/2022    Back skin lesion 05/01/2017    BPPV (benign paroxysmal positional vertigo), bilateral 09/27/2021    Cervical radiculopathy     Bilateral upper extemities    Corns and callus 07/09/2020    COVID 12/2021    mild    Degeneration, intervertebral disc, cervical     C4-C5 through C6-C7 levels    Diabetes mellitus (HCC)     Headache(784.0)     Hyperlipidemia     Hypertension Low back pain radiating to left lower extremity 02/17/2020    NAFLD (nonalcoholic fatty liver disease) 01/09/2023    Peptic ulcer 01/21/2020    Pressure in left side of chest 06/18/2021    Spinal stenosis     Congenital     Type 2 diabetes mellitus without complication, without long-term current use of insulin ([facility] 08/15/2016    Urinary frequency 02/25/2022     Past Surgical History:   Procedure Laterality Date    ANUS SURGERY  Feb 2013    Excision Rianna-anal condyloma, anoscopy    BACK SURGERY      epidural    CHOLECYSTECTOMY, LAPAROSCOPIC  2010    LYMPH NODE BIOPSY      benign    NERVE BLOCK Left 12/14/2020    LEFT L4 AND L5 TRANSFORAMINAL EPIDURAL STEROID INJECTION (CPT 96231) performed by Mendez Diaz MD at [facility] Left 02/01/2021    TFESI    NERVE BLOCK Left 2/1/2021    LEFT L4 AND L5 TRANSFORAMINAL EPIDURAL STEROID INJECTION WITH SEDATION (CPT 04036) performed by Mendez Diaz MD at [facility] Bilateral 1/16/2023    BILATERAL L3,4,5 MEDIAL BRANCH BLOCK performed by Mendez Diaz MD at [address] [place] [address]     Prior to Admission medications    Medication Sig Start Date End Date Taking?  Authorizing Provider   empagliflozin (JARDIANCE) 25 MG tablet Take 1 tablet by mouth daily 1/9/23  Yes Justin Valentin APRN - CNP   dulaglutide (TRULICITY) 1.5 PL/7.6RS SC injection Inject 0.5 mLs into the skin once a week 1/9/23  Yes Justin Valentin, APRN - CNP   atorvastatin (LIPITOR) 80 MG tablet Take 1 tablet by mouth at bedtime 1/9/23  Yes Justin Valentin, APRN - CNP   atenolol (TENORMIN) 25 MG tablet TAKE ONE TABLET BY MOUTH ONCE A DAY. 1/9/23  Yes Justin Valentin, APRN - CNP   lisinopril (PRINIVIL;ZESTRIL) 10 MG tablet Take 1 tablet by mouth daily 1/9/23  Yes Justin Valentin, APRN - CNP   metFORMIN (GLUCOPHAGE-XR) 500 MG extended release tablet Take 1 tablet by mouth 2 times daily (with meals) 1/9/23  Yes Justin Valentin, APRN - CNP   Omega-3 Fatty Acids (FISH OIL) 1000 MG capsule Take by mouth daily   Yes Historical Provider, MD   fluticasone (FLONASE) 50 MCG/ACT nasal spray 1 spray by Nasal route daily 10/3/22  Yes SUZIE Martines CNP   tamsulosin [facility]) 0.4 MG capsule Take 1 capsule by mouth daily 10/3/22  Yes SUZIE Martines CNP   blood glucose monitor strips Test 1 times a day & as needed for symptoms of irregular blood glucose. 22  Yes SUZIE Martines CNP   Lancets MISC 1 each by Does not apply route daily 22  Yes SUZIE Leyva CNP   albuterol sulfate HFA (VENTOLIN HFA) 108 (90 Base) MCG/ACT inhaler Inhale 2 puffs into the lungs 4 times daily as needed for Wheezing 22  Yes Elvis Dorman,    nitroGLYCERIN (NITROSTAT) 0.4 MG SL tablet DISSOLVE 1 TABLET UNDER THE TONGUE AS NEEDED 21  Yes Historical Provider, MD   hydrocortisone 2.5 % cream Apply topically 2 times daily to affected areas of skin. 21  Yes Kody Persaud PA-C   Blood Glucose Monitoring Suppl (ACURA BLOOD GLUCOSE METER) w/Device KIT 1 Device by Does not apply route daily 20  Yes SUZIE Leyva CNP   Multiple Vitamin (MULTI-VITAMIN) TABS Take 1 tablet by mouth Daily.    Yes Historical Provider, MD     Allergies   Allergen Reactions    Benadryl [Diphenhydramine]      Felt weird    Lopid [Gemfibrozil] Other (See Comments)     Enlarged prostate     Social History     Socioeconomic History    Marital status:      Spouse name: Not on file    Number of children: Not on file    Years of education: Not on file    Highest education level: Not on file   Occupational History    Not on file   Tobacco Use    Smoking status: Former     Packs/day: 1.50     Years: 25.00     Pack years: 37.50     Types: Cigarettes     Quit date: 2002     Years since quittin.9    Smokeless tobacco: Never   Vaping Use    Vaping Use: Never used   Substance and Sexual Activity    Alcohol use: Yes     Comment: Social     Drug use: No    Sexual activity: Yes     Partners: Female   Other Topics Concern    Not on file   Social History Narrative    Not on file     Social Determinants of Health     Financial Resource Strain: Low Risk     Difficulty of Paying Living Expenses: Not hard at all   Food Insecurity: No Food Insecurity    Worried About Running Out of Food in the Last Year: Never true    Ran Out of Food in the Last Year: Never true   Transportation Needs: Not on file   Physical Activity: Not on file   Stress: Not on file   Social Connections: Not on file   Intimate Partner Violence: Not on file   Housing Stability: Not on file     Family History   Problem Relation Age of Onset    Hypertension Mother     Hypertension Father     Cancer Father         Prostate    Heart Surgery Father     Heart Disease Father     High Cholesterol Sister     High Cholesterol Brother     Heart Disease Brother      REVIEW OF SYSTEMS:     [place] denies fever/chills, chest pain, shortness of breath, new bowel or bladder complaints. All other review of systems was negative. PHYSICAL EXAMINATION:      Temp 97.5 °F (36.4 °C) (Infrared)   Resp 18   Ht 5' 7\" (1.702 m)   Wt 191 lb (86.6 kg)   BMI 29.91 kg/m²     General:       General appearance:   pleasant and well-hydrated. , in mild to moderate discomfort and A & O x3  Build:Normal Weight     HEENT:     Head:normocephalic and atraumatic  Sclera: icterus absent,      Lungs:     Breathing:Breathing Pattern: regular, no distress     Abdomen:     Shape:non-distended and normal  Tenderness:none     Lumbar spine:     Spine inspection:normal   CVA tenderness:No   Palpation:tenderness paravertebral muscles, facet loading, left, right, positive and tenderness. Range of motion:abnormal moderately Lateral bending, flexion, extension rotation bilateral and is  painful. Musculoskeletal:     Trigger points in Paraveteral:present right trapezius and infraspinatus.   SI joint tenderness:negative right, negative left  SLR:negative right, negative left, sitting Extremities:     Tremors:None bilaterally upper and lower  Range of motion:pain with internal rotation of hips negative. Normal ROM of right shoulder. Intact:Yes  Edema:Normal     Neurological:     Sensory:normal to light touch bilateral lower extremities. Motor:                             Right Quadriceps5/5          Left Quadriceps5/5           Right Gastrocnemius5/5    Left Gastrocnemius5/5  Right Ant Tibialis5/5  Left Ant Tibialis5/5  Gait:normal     Dermatology:     Skin:no unusual rashes and no skin lesions    Impression:    Low back pain with radiation to the Left lower extremity along the Left L4 dermatome. Lumbar spine MRI 2020:L4-5 Left paracentral disc extrusion   Plan:  Follow up on his low back pain with no acute issues. Patient is s/p bilateral L3,4,5 MBB with more than 80% improvement in his pain for 4 days, discussed repeating and if his pain relief is consistent with first procedure will proceed with RFA. Continue with OTC pain medications. Waynemeredith Dotson OARRS report reviewed 01/2023. Patient encouraged to stay active and to lose weight. Treatment plan discussed with the patient including procedure side effects. We discussed with the patient that combining opioids, benzodiazepines, alcohol, illicit drugs or sleep aids increases the risk of respiratory depression including death. We discussed that these medications may cause drowsiness, sedation or dizziness and have counseled the patient not to drive or operate machinery. We have discussed that these medications will be prescribed only by one provider. We have discussed with the patient about age related risk factors and have thoroughly discussed the importance of taking these medications as prescribed. The patient verbalizes understanding. vivien Siegel M.D.

## 2023-01-27 NOTE — PROGRESS NOTES
Do you currently have any of the following:    Fever: No  Headache:  No  Cough: No  Shortness of breath: No  Exposed to anyone with these symptoms: No                                                                                                                Domingo Papi presents to the [address] [address] on 1/27/2023. Bobby Coe is complaining of pain in his lower back. The pain is intermittent. The pain is described as sharp. Pain is rated on his best day at a 0, on his worst day at a 8, and on average at a 5 on the VAS scale. He took his last dose of Tylenol 2 days ago. Bobby Coe does not have issues with constipation. Any procedures since your last visit: Yes, with 100 % relief. He is not on NSAIDS and  is not on anticoagulation medications to include none and is managed by NA. Pacemaker or defibrillator: No Physician managing device is NA. Medication Contract and Consent for Opioid Use Documents Filed        No documents found                       Temp 97.5 °F (36.4 °C) (Infrared)   Resp 18   Ht 5' 7\" (1.702 m)   Wt 191 lb (86.6 kg)   BMI 29.91 kg/m²      No LMP for male patient.

## 2023-02-16 ENCOUNTER — OFFICE VISIT (OUTPATIENT)
Dept: FAMILY MEDICINE CLINIC | Age: 63
End: 2023-02-16

## 2023-02-16 VITALS
TEMPERATURE: 96.8 F | HEART RATE: 64 BPM | SYSTOLIC BLOOD PRESSURE: 126 MMHG | WEIGHT: 194 LBS | OXYGEN SATURATION: 99 % | DIASTOLIC BLOOD PRESSURE: 72 MMHG | HEIGHT: 68 IN | BODY MASS INDEX: 29.4 KG/M2 | RESPIRATION RATE: 16 BRPM

## 2023-02-16 DIAGNOSIS — Z09 HOSPITAL DISCHARGE FOLLOW-UP: Primary | ICD-10-CM

## 2023-02-16 DIAGNOSIS — I20.0 UNSTABLE ANGINA PECTORIS (HCC): ICD-10-CM

## 2023-02-16 PROBLEM — H81.13 BENIGN PAROXYSMAL POSITIONAL VERTIGO DUE TO BILATERAL VESTIBULAR DISORDER: Status: RESOLVED | Noted: 2021-09-27 | Resolved: 2023-02-16

## 2023-02-16 RX ORDER — NITROGLYCERIN 0.4 MG/1
TABLET SUBLINGUAL
Qty: 25 TABLET | Refills: 0 | Status: SHIPPED | OUTPATIENT
Start: 2023-02-16

## 2023-02-16 RX ORDER — ASPIRIN 81 MG/1
81 TABLET, CHEWABLE ORAL DAILY
COMMUNITY

## 2023-02-16 SDOH — ECONOMIC STABILITY: FOOD INSECURITY: WITHIN THE PAST 12 MONTHS, YOU WORRIED THAT YOUR FOOD WOULD RUN OUT BEFORE YOU GOT MONEY TO BUY MORE.: NEVER TRUE

## 2023-02-16 SDOH — ECONOMIC STABILITY: FOOD INSECURITY: WITHIN THE PAST 12 MONTHS, THE FOOD YOU BOUGHT JUST DIDN'T LAST AND YOU DIDN'T HAVE MONEY TO GET MORE.: NEVER TRUE

## 2023-02-16 SDOH — ECONOMIC STABILITY: INCOME INSECURITY: HOW HARD IS IT FOR YOU TO PAY FOR THE VERY BASICS LIKE FOOD, HOUSING, MEDICAL CARE, AND HEATING?: NOT HARD AT ALL

## 2023-02-16 NOTE — ASSESSMENT & PLAN NOTE
New problem he had + cardiac cath  HE had CTA with blockage noted. he had cardiac cath and was found to have Large calcified plaques left main,20% proximal LAD 30 - 40% and diagonal branches 20%, as well as the proximal left circumflex artery. , extensive plaque throughout a small right coronary artery. several vessels with plaque. He is on Atorvastatin 80 mg and states he needs to watch his diet and to lose 10 pounds. He is feeling okay today. He is able to RTW on Monday. He has follow up with Dr. Ariel Mortensen. He will get his labs done at the end of the month. He denies any CP since discharge. Reviewed labs from [address] [place] [address], EKG's x 2, CXR, CTA. He will continue current medications, keep follow up with Dr. Ariel Talbert. Discussed nitro and expiration 30 days once opened.

## 2023-02-16 NOTE — PROGRESS NOTES
Post-Discharge Transitional Care Management Progress Note      Ziggy George   YOB: 1960    Date of Office Visit:  2/16/2023  Date of Hospital Admission: 2/13/23  Date of Hospital Discharge: 2/15/23    Care management risk score Rising risk (score 2-5) and Complex Care (Scores >=6): No Risk Score On File     Non face to face  following discharge, date last encounter closed (first attempt may have been earlier): *No documented post hospital discharge outreach found in the last 14 days *No documented post hospital discharge outreach found in the last 14 days    Call initiated 2 business days of discharge: *No response recorded in the last 14 days    ASSESSMENT/PLAN:   Hospital discharge follow-up  -     [place] DISCHARGE MEDS RECONCILED W/ CURRENT OUTPATIENT MED LIST  Unstable angina pectoris ([facility] [address]  Assessment & Plan:   New problem he had + cardiac cath  HE had CTA with blockage noted. he had cardiac cath and was found to have Large calcified plaques left main,20% proximal LAD 30 - 40% and diagonal branches 20%, as well as the proximal left circumflex artery. , extensive plaque throughout a small right coronary artery. several vessels with plaque. He is on Atorvastatin 80 mg and states he needs to watch his diet and to lose 10 pounds. He is feeling okay today. He is able to RTW on Monday. He has follow up with Dr. Jovanna Calderon May. He will get his labs done at the end of the month. He denies any CP since discharge. Reviewed labs from [facility], EKG's x 2, CXR, CTA. Orders:  -     nitroGLYCERIN (NITROSTAT) 0.4 MG SL tablet; DISSOLVE 1 TABLET UNDER THE TONGUE AS NEEDED, Disp-25 tablet, R-0Normal      Medical Decision Making: moderate complexity  Return for keep appt for April.     On this date 2/16/2023 I have spent 30 minutes reviewing previous notes, test results and face to face with the patient discussing the diagnosis and importance of compliance with the treatment plan as well as documenting on the day of the visit. 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 He took a nitro and they sent him to the hospital as it worked. HE had CTA with blockage noted. he had cardiac cath and was found to have Large calcified plaques left main,20% proximal LAD 30 - 40% and diagonal branches 20%, as well as the proximal left circumflex artery. , extensive plaque throughout a small right coronary artery. several vessels with plaque. He is on Atorvastatin 80 mg and states he needs to watch his diet and to lose 10 pounds. He is feeling okay today. He is able to RTW on Monday. He has follow up with Dr. Charli Mortensen. He will get his labs done at the end of the month. He denies any CP since discharge. Reviewed labs from [facility], EKG's x 2, CXR, CTA. Inpatient course: Discharge summary reviewed- see chart.     Interval history/Current status: stable    Patient Active Problem List   Diagnosis    Chronic left hip pain    Type 2 diabetes mellitus with stage 2 chronic kidney disease, without long-term current use of insulin (HCC)    Benign prostatic hyperplasia with weak urinary stream    Mass of skin of shoulder    Pain of right knee and lower leg    Mixed hyperlipidemia    Essential hypertension    Somatic dysfunction of back    Low back pain radiating to left lower extremity    Bone callus    Exostosis of toe    Chronic pain syndrome    Lumbar disc disorder    Lumbar facet arthropathy    Lumbar radiculopathy    Lumbar spondylosis    Stenosis of lateral recess of lumbar spine    Foraminal stenosis of lumbar region    Right sciatic nerve pain    Needs flu shot    Impacted cerumen of right ear    Family history of clotting disorder    BPH with obstruction/lower urinary tract symptoms    Elevated ALT measurement    Allergic rhinitis due to allergen    NAFLD (nonalcoholic fatty liver disease)    Unstable angina pectoris [facility])    Hospital discharge follow-up       Medications listed as ordered at the time of discharge from hospital     Medication List            Accurate as of February 16, 2023  3:05 PM. If you have any questions, ask your nurse or doctor. CONTINUE taking these medications      [employer] Blood Glucose Meter w/Device Kit  1 Device by Does not apply route daily     albuterol sulfate  (90 Base) MCG/ACT inhaler  Commonly known as: Ventolin HFA  Inhale 2 puffs into the lungs 4 times daily as needed for Wheezing     aspirin 81 MG chewable tablet     atenolol 25 MG tablet  Commonly known as: TENORMIN  TAKE ONE TABLET BY MOUTH ONCE A DAY. atorvastatin 80 MG tablet  Commonly known as: LIPITOR  Take 1 tablet by mouth at bedtime     blood glucose test strips  Test 1 times a day & as needed for symptoms of irregular blood glucose. dulaglutide 1.5 MG/0.5ML SC injection  Commonly known as: TRULICITY  Inject 0.5 mLs into the skin once a week     empagliflozin 25 MG tablet  Commonly known as: Jardiance  Take 1 tablet by mouth daily     fish oil 1000 MG capsule     fluticasone 50 MCG/ACT nasal spray  Commonly known as: Flonase  1 spray by Nasal route daily     hydrocortisone 2.5 % cream  Apply topically 2 times daily to affected areas of skin.      Lancets Misc  1 each by Does not apply route daily     lisinopril 10 MG tablet  Commonly known as: PRINIVIL;ZESTRIL  Take 1 tablet by mouth daily     metFORMIN 500 MG extended release tablet  Commonly known as: GLUCOPHAGE-XR  Take 1 tablet by mouth 2 times daily (with meals)     Multi-Vitamin Tabs     nitroGLYCERIN 0.4 MG SL tablet  Commonly known as: NITROSTAT  DISSOLVE 1 TABLET UNDER THE TONGUE AS NEEDED     tamsulosin 0.4 MG capsule  Commonly known as: FLOMAX  Take 1 capsule by mouth daily               Where to Get Your Medications        These medications were sent to [address], [place] [address] [facility] [address] [place] [address]      Phone: [phone]   nitroGLYCERIN 0.4 MG SL tablet           Medications marked \"taking\" at this time  Outpatient Medications Marked as Taking for the 2/16/23 encounter (Office Visit) with Georgette Aguilar, APRN - CNP   Medication Sig Dispense Refill    aspirin 81 MG chewable tablet Take 81 mg by mouth daily      nitroGLYCERIN (NITROSTAT) 0.4 MG SL tablet DISSOLVE 1 TABLET UNDER THE TONGUE AS NEEDED 25 tablet 0    empagliflozin (JARDIANCE) 25 MG tablet Take 1 tablet by mouth daily 30 tablet 6    dulaglutide (TRULICITY) 1.5 LF/6.6TA SC injection Inject 0.5 mLs into the skin once a week 4 Adjustable Dose Pre-filled Pen Syringe 12    atorvastatin (LIPITOR) 80 MG tablet Take 1 tablet by mouth at bedtime 90 tablet 0    atenolol (TENORMIN) 25 MG tablet TAKE ONE TABLET BY MOUTH ONCE A DAY. 90 tablet 1    lisinopril (PRINIVIL;ZESTRIL) 10 MG tablet Take 1 tablet by mouth daily 90 tablet 1    metFORMIN (GLUCOPHAGE-XR) 500 MG extended release tablet Take 1 tablet by mouth 2 times daily (with meals) 180 tablet 1    Omega-3 Fatty Acids (FISH OIL) 1000 MG capsule Take by mouth daily      fluticasone (FLONASE) 50 MCG/ACT nasal spray 1 spray by Nasal route daily 3 each 3    tamsulosin (FLOMAX) 0.4 MG capsule Take 1 capsule by mouth daily 90 capsule 3    blood glucose monitor strips Test 1 times a day & as needed for symptoms of irregular blood glucose. 100 strip 3    Lancets MISC 1 each by Does not apply route daily 100 each 5    albuterol sulfate HFA (VENTOLIN HFA) 108 (90 Base) MCG/ACT inhaler Inhale 2 puffs into the lungs 4 times daily as needed for Wheezing 18 g 0    hydrocortisone 2.5 % cream Apply topically 2 times daily to affected areas of skin.  28 g 1    Multiple Vitamin (MULTI-VITAMIN) TABS Take 1 tablet by mouth Daily. Medications patient taking as of now reconciled against medications ordered at time of hospital discharge: Yes    A comprehensive review of systems was negative except for what was noted in the HPI. Objective:    /72   Pulse 64   Temp 96.8 °F (36 °C)   Resp 16   Ht 5' 7.5\" (1.715 m)   Wt 194 lb (88 kg)   SpO2 99%   BMI 29.94 kg/m²   General Appearance: alert and oriented to person, place and time, well developed and well- nourished, in no acute distress  Skin: warm and dry, no rash or erythema  Head: normocephalic and atraumatic  Neck: supple and non-tender without mass, no thyromegaly or thyroid nodules, no cervical lymphadenopathy  Pulmonary/Chest: clear to auscultation bilaterally- no wheezes, rales or rhonchi, normal air movement, no respiratory distress  Cardiovascular: normal rate, regular rhythm, normal S1 and S2, no murmurs, rubs, clicks, or gallops, distal pulses intact, no carotid bruits  Abdomen: soft, non-tender, non-distended, normal bowel sounds, no masses or organomegaly  Extremities: no cyanosis, clubbing or edema  Neurologic: gait, coordination and speech normal    An electronic signature was used to authenticate this note.   --SUZIE Quinonez - CNP

## 2023-02-24 DIAGNOSIS — E11.22 TYPE 2 DIABETES MELLITUS WITH STAGE 2 CHRONIC KIDNEY DISEASE, WITHOUT LONG-TERM CURRENT USE OF INSULIN (HCC): ICD-10-CM

## 2023-02-24 DIAGNOSIS — Z12.5 SCREENING FOR PROSTATE CANCER: ICD-10-CM

## 2023-02-24 DIAGNOSIS — N18.2 TYPE 2 DIABETES MELLITUS WITH STAGE 2 CHRONIC KIDNEY DISEASE, WITHOUT LONG-TERM CURRENT USE OF INSULIN (HCC): ICD-10-CM

## 2023-02-24 DIAGNOSIS — E78.2 MIXED HYPERLIPIDEMIA: ICD-10-CM

## 2023-02-24 DIAGNOSIS — I10 ESSENTIAL HYPERTENSION: ICD-10-CM

## 2023-02-24 LAB
ALBUMIN SERPL-MCNC: 4.4 G/DL (ref 3.5–5.2)
ALP BLD-CCNC: 103 U/L (ref 40–129)
ALT SERPL-CCNC: 51 U/L (ref 0–40)
ANION GAP SERPL CALCULATED.3IONS-SCNC: 18 MMOL/L (ref 7–16)
AST SERPL-CCNC: 29 U/L (ref 0–39)
BASOPHILS ABSOLUTE: 0.11 E9/L (ref 0–0.2)
BASOPHILS RELATIVE PERCENT: 1.5 % (ref 0–2)
BILIRUB SERPL-MCNC: 0.6 MG/DL (ref 0–1.2)
BUN BLDV-MCNC: 17 MG/DL (ref 6–23)
CALCIUM SERPL-MCNC: 9.4 MG/DL (ref 8.6–10.2)
CHLORIDE BLD-SCNC: 104 MMOL/L (ref 98–107)
CHOLESTEROL, TOTAL: 127 MG/DL (ref 0–199)
CO2: 21 MMOL/L (ref 22–29)
CREAT SERPL-MCNC: 1.1 MG/DL (ref 0.7–1.2)
EOSINOPHILS ABSOLUTE: 0.25 E9/L (ref 0.05–0.5)
EOSINOPHILS RELATIVE PERCENT: 3.3 % (ref 0–6)
GFR SERPL CREATININE-BSD FRML MDRD: >60 ML/MIN/1.73
GLUCOSE BLD-MCNC: 154 MG/DL (ref 74–99)
HCT VFR BLD CALC: 47.3 % (ref 37–54)
HDLC SERPL-MCNC: 42 MG/DL
HEMOGLOBIN: 15.5 G/DL (ref 12.5–16.5)
IMMATURE GRANULOCYTES #: 0.08 E9/L
IMMATURE GRANULOCYTES %: 1.1 % (ref 0–5)
LDL CHOLESTEROL CALCULATED: 28 MG/DL (ref 0–99)
LYMPHOCYTES ABSOLUTE: 1.86 E9/L (ref 1.5–4)
LYMPHOCYTES RELATIVE PERCENT: 24.6 % (ref 20–42)
MCH RBC QN AUTO: 31.2 PG (ref 26–35)
MCHC RBC AUTO-ENTMCNC: 32.8 % (ref 32–34.5)
MCV RBC AUTO: 95.2 FL (ref 80–99.9)
MONOCYTES ABSOLUTE: 0.7 E9/L (ref 0.1–0.95)
MONOCYTES RELATIVE PERCENT: 9.3 % (ref 2–12)
NEUTROPHILS ABSOLUTE: 4.55 E9/L (ref 1.8–7.3)
NEUTROPHILS RELATIVE PERCENT: 60.2 % (ref 43–80)
PDW BLD-RTO: 12.3 FL (ref 11.5–15)
PLATELET # BLD: 237 E9/L (ref 130–450)
PMV BLD AUTO: 12.7 FL (ref 7–12)
POTASSIUM SERPL-SCNC: 4.6 MMOL/L (ref 3.5–5)
PROSTATE SPECIFIC ANTIGEN: 0.36 NG/ML (ref 0–4)
RBC # BLD: 4.97 E12/L (ref 3.8–5.8)
SODIUM BLD-SCNC: 143 MMOL/L (ref 132–146)
TOTAL PROTEIN: 7.3 G/DL (ref 6.4–8.3)
TRIGL SERPL-MCNC: 285 MG/DL (ref 0–149)
VLDLC SERPL CALC-MCNC: 57 MG/DL
WBC # BLD: 7.6 E9/L (ref 4.5–11.5)

## 2023-03-06 ENCOUNTER — TELEPHONE (OUTPATIENT)
Dept: PAIN MANAGEMENT | Age: 63
End: 2023-03-06

## 2023-03-06 NOTE — TELEPHONE ENCOUNTER
Call to Yumiko Mckinley that procedure was approved for 3/13/2023 and that [facility] should call him a few days before for the pre op call and after 3:00 PM the business day before with the arrival time. Instructed to call office back if any questions. Rohan verbalized understanding.     Electronically signed by Chaparro Marroquin RN on 3/6/2023 at 1:58 PM

## 2023-03-07 ENCOUNTER — PREP FOR PROCEDURE (OUTPATIENT)
Dept: PAIN MANAGEMENT | Age: 63
End: 2023-03-07

## 2023-03-18 PROBLEM — Z09 HOSPITAL DISCHARGE FOLLOW-UP: Status: RESOLVED | Noted: 2023-02-16 | Resolved: 2023-03-18

## 2023-03-30 ENCOUNTER — ANESTHESIA EVENT (OUTPATIENT)
Dept: OPERATING ROOM | Age: 63
End: 2023-03-30
Payer: COMMERCIAL

## 2023-03-30 ASSESSMENT — LIFESTYLE VARIABLES: SMOKING_STATUS: 0

## 2023-04-03 ENCOUNTER — HOSPITAL ENCOUNTER (OUTPATIENT)
Dept: OPERATING ROOM | Age: 63
Setting detail: OUTPATIENT SURGERY
Discharge: HOME | End: 2023-04-03
Attending: PAIN MEDICINE
Payer: COMMERCIAL

## 2023-04-03 ENCOUNTER — ANESTHESIA (OUTPATIENT)
Dept: OPERATING ROOM | Age: 63
End: 2023-04-03
Payer: COMMERCIAL

## 2023-04-03 ENCOUNTER — HOSPITAL ENCOUNTER (OUTPATIENT)
Age: 63
Setting detail: OUTPATIENT SURGERY
Discharge: HOME | End: 2023-04-03
Attending: PAIN MEDICINE | Admitting: PAIN MEDICINE
Payer: COMMERCIAL

## 2023-04-03 VITALS
HEIGHT: 68 IN | BODY MASS INDEX: 29.86 KG/M2 | RESPIRATION RATE: 16 BRPM | HEART RATE: 64 BPM | TEMPERATURE: 97.7 F | DIASTOLIC BLOOD PRESSURE: 74 MMHG | WEIGHT: 197 LBS | OXYGEN SATURATION: 100 % | SYSTOLIC BLOOD PRESSURE: 122 MMHG

## 2023-04-03 DIAGNOSIS — M47.896 OTHER OSTEOARTHRITIS OF SPINE, LUMBAR REGION: ICD-10-CM

## 2023-04-03 LAB — METER GLUCOSE: 124 MG/DL (ref 74–99)

## 2023-04-03 PROCEDURE — 64494 INJ PARAVERT F JNT L/S 2 LEV: CPT | Performed by: PAIN MEDICINE

## 2023-04-03 PROCEDURE — 7100000010 HC PHASE II RECOVERY - FIRST 15 MIN: Performed by: PAIN MEDICINE

## 2023-04-03 PROCEDURE — 3700000000 HC ANESTHESIA ATTENDED CARE: Performed by: PAIN MEDICINE

## 2023-04-03 PROCEDURE — 2580000003 HC RX 258: Performed by: ANESTHESIOLOGY

## 2023-04-03 PROCEDURE — 64493 INJ PARAVERT F JNT L/S 1 LEV: CPT | Performed by: PAIN MEDICINE

## 2023-04-03 PROCEDURE — 82962 GLUCOSE BLOOD TEST: CPT

## 2023-04-03 PROCEDURE — 3600000005 HC SURGERY LEVEL 5 BASE: Performed by: PAIN MEDICINE

## 2023-04-03 PROCEDURE — 6360000002 HC RX W HCPCS: Performed by: PAIN MEDICINE

## 2023-04-03 PROCEDURE — 6360000002 HC RX W HCPCS: Performed by: NURSE ANESTHETIST, CERTIFIED REGISTERED

## 2023-04-03 PROCEDURE — 7100000011 HC PHASE II RECOVERY - ADDTL 15 MIN: Performed by: PAIN MEDICINE

## 2023-04-03 PROCEDURE — 2709999900 HC NON-CHARGEABLE SUPPLY: Performed by: PAIN MEDICINE

## 2023-04-03 PROCEDURE — 3209999900 FLUORO FOR SURGICAL PROCEDURES

## 2023-04-03 PROCEDURE — 2500000003 HC RX 250 WO HCPCS: Performed by: PAIN MEDICINE

## 2023-04-03 RX ORDER — SODIUM CHLORIDE 0.9 % (FLUSH) 0.9 %
5-40 SYRINGE (ML) INJECTION PRN
Status: DISCONTINUED | OUTPATIENT
Start: 2023-04-03 | End: 2023-04-03 | Stop reason: HOSPADM

## 2023-04-03 RX ORDER — SODIUM CHLORIDE 9 MG/ML
INJECTION, SOLUTION INTRAVENOUS PRN
Status: DISCONTINUED | OUTPATIENT
Start: 2023-04-03 | End: 2023-04-03 | Stop reason: HOSPADM

## 2023-04-03 RX ORDER — MIDAZOLAM HYDROCHLORIDE 1 MG/ML
INJECTION INTRAMUSCULAR; INTRAVENOUS PRN
Status: DISCONTINUED | OUTPATIENT
Start: 2023-04-03 | End: 2023-04-03 | Stop reason: SDUPTHER

## 2023-04-03 RX ORDER — LIDOCAINE HYDROCHLORIDE 5 MG/ML
INJECTION, SOLUTION INFILTRATION; INTRAVENOUS PRN
Status: DISCONTINUED | OUTPATIENT
Start: 2023-04-03 | End: 2023-04-03 | Stop reason: ALTCHOICE

## 2023-04-03 RX ORDER — PROPOFOL 10 MG/ML
INJECTION, EMULSION INTRAVENOUS PRN
Status: DISCONTINUED | OUTPATIENT
Start: 2023-04-03 | End: 2023-04-03 | Stop reason: SDUPTHER

## 2023-04-03 RX ORDER — FENTANYL CITRATE 50 UG/ML
INJECTION, SOLUTION INTRAMUSCULAR; INTRAVENOUS PRN
Status: DISCONTINUED | OUTPATIENT
Start: 2023-04-03 | End: 2023-04-03 | Stop reason: SDUPTHER

## 2023-04-03 RX ORDER — SODIUM CHLORIDE, SODIUM LACTATE, POTASSIUM CHLORIDE, CALCIUM CHLORIDE 600; 310; 30; 20 MG/100ML; MG/100ML; MG/100ML; MG/100ML
INJECTION, SOLUTION INTRAVENOUS CONTINUOUS
Status: DISCONTINUED | OUTPATIENT
Start: 2023-04-03 | End: 2023-04-03 | Stop reason: HOSPADM

## 2023-04-03 RX ORDER — SODIUM CHLORIDE 0.9 % (FLUSH) 0.9 %
5-40 SYRINGE (ML) INJECTION EVERY 12 HOURS SCHEDULED
Status: DISCONTINUED | OUTPATIENT
Start: 2023-04-03 | End: 2023-04-03 | Stop reason: HOSPADM

## 2023-04-03 RX ADMIN — PROPOFOL 20 MG: 10 INJECTION, EMULSION INTRAVENOUS at 14:40

## 2023-04-03 RX ADMIN — SODIUM CHLORIDE, POTASSIUM CHLORIDE, SODIUM LACTATE AND CALCIUM CHLORIDE: 600; 310; 30; 20 INJECTION, SOLUTION INTRAVENOUS at 13:51

## 2023-04-03 RX ADMIN — PROPOFOL 20 MG: 10 INJECTION, EMULSION INTRAVENOUS at 14:41

## 2023-04-03 RX ADMIN — FENTANYL CITRATE 50 MCG: 50 INJECTION INTRAMUSCULAR; INTRAVENOUS at 14:41

## 2023-04-03 RX ADMIN — MIDAZOLAM 2 MG: 1 INJECTION INTRAMUSCULAR; INTRAVENOUS at 14:39

## 2023-04-03 ASSESSMENT — PAIN DESCRIPTION - LOCATION: LOCATION: BACK

## 2023-04-03 ASSESSMENT — PAIN SCALES - GENERAL
PAINLEVEL_OUTOF10: 0
PAINLEVEL_OUTOF10: 2
PAINLEVEL_OUTOF10: 0

## 2023-04-03 ASSESSMENT — PAIN DESCRIPTION - DESCRIPTORS: DESCRIPTORS: ACHING

## 2023-04-03 ASSESSMENT — PAIN - FUNCTIONAL ASSESSMENT: PAIN_FUNCTIONAL_ASSESSMENT: 0-10

## 2023-04-03 NOTE — ANESTHESIA POSTPROCEDURE EVALUATION
Department of Anesthesiology  Postprocedure Note    Patient: Liyah Rubin  MRN: [id number]  YOB: 1960  Date of evaluation: 4/3/2023      Procedure Summary     Date: 04/03/23 Room / Location: [address] [place] [address] 04 / [address] [facility]    Anesthesia Start: 9808 Anesthesia Stop: 6811    Procedure: Repeat BILATERAL L3,4,5 Medial Branch Block (Bilateral: Back) Diagnosis:       Lumbar spondylosis      (Lumbar spondylosis [X55.726])    Surgeons: Faith Raygoza MD Responsible Provider: Celina Yu MD    Anesthesia Type: MAC ASA Status: 3          Anesthesia Type: MAC    Bradford Phase I: Bradford Score: 10    Bradford Phase II: Bradford Score: 10      Anesthesia Post Evaluation    Patient location during evaluation: PACU  Patient participation: complete - patient participated  Level of consciousness: awake and alert  Pain score: 3  Airway patency: patent  Nausea & Vomiting: no nausea  Complications: no  Cardiovascular status: blood pressure returned to baseline  Respiratory status: acceptable  Hydration status: euvolemic

## 2023-04-03 NOTE — OP NOTE
articulating process where the medial branch traverses under fluoroscopic guidance. Once bone was contacted and negative aspiration was confirmed. A solution of 0.25% marcaine with 40 DepoMedrol 3 cc was then injected and distributed equally at each level. Following the procedure Rohan noted improvement of previous pain symptoms. Disposition the patient tolerated the procedure well and there were no complications . Vital signs remained stable throughout the procedure. The patient was escorted to the recovery area where they remained until discharge and written discharge instructions for the procedure were given. Plan: Fredrick Adame will return to our pain management center as scheduled.      Jamison Johnson MD

## 2023-04-03 NOTE — ANESTHESIA PRE PROCEDURE
Surgical History:        Procedure Laterality Date    ANUS SURGERY  2013    Excision Rianna-anal condyloma, anoscopy    CARDIAC CATHETERIZATION  02/15/2023    done @ [facility]-dr bautista    very mild CAD    CHOLECYSTECTOMY, LAPAROSCOPIC      COLONOSCOPY      ENDOSCOPY, COLON, DIAGNOSTIC      LYMPH NODE BIOPSY      benign    NERVE BLOCK Left 2020    LEFT L4 AND L5 TRANSFORAMINAL EPIDURAL STEROID INJECTION (CPT 88977) performed by Jerry Angel MD at [facility] 2021    TFESI    NERVE BLOCK Left 2021    LEFT L4 AND L5 TRANSFORAMINAL EPIDURAL STEROID INJECTION WITH SEDATION (CPT 98194) performed by Jerry Angel MD at [address] [facility] [address] Bilateral 2023    BILATERAL L3,4,5 MEDIAL BRANCH BLOCK performed by Jerry Angel MD at [address] History:    Social History     Tobacco Use    Smoking status: Former     Years: 25.00     Types: Cigarettes     Quit date: 2002     Years since quittin.1    Smokeless tobacco: Never   Substance Use Topics    Alcohol use: Yes     Comment: rare                                Counseling given: Not Answered      Vital Signs (Current):   Vitals:    23 1010 23 1334   BP:  125/74   Pulse:  69   Resp:  16   Temp:  97.7 °F (36.5 °C)   TempSrc:  Temporal   SpO2:  95%   Weight: 197 lb (89.4 kg)    Height: 5' 7.75\" (1.721 m)                                               BP Readings from Last 3 Encounters:   23 125/74   23 126/72   23 128/82       NPO Status: Time of last liquid consumption:                         Time of last solid consumption:                         Date of last liquid consumption: 23                        Date of last solid food consumption: 23    BMI:   Wt Readings from Last 3 Encounters:   23 197 lb (89.4 kg)   23 194 lb (88 kg)   23 191 lb (86.6 kg)     Body mass index is 30.18
COLONOSCOPY      ENDOSCOPY, COLON, DIAGNOSTIC      LYMPH NODE BIOPSY      benign    NERVE BLOCK Left 2020    LEFT L4 AND L5 TRANSFORAMINAL EPIDURAL STEROID INJECTION (CPT 00524) performed by Kayleigh Bernabe MD at [facility] Left 2021    TFESI    NERVE BLOCK Left 2021    LEFT L4 AND L5 TRANSFORAMINAL EPIDURAL STEROID INJECTION WITH SEDATION (CPT 46697) performed by Kayleigh Bernabe MD at [address] [facility] [address] Bilateral 2023    BILATERAL L3,4,5 MEDIAL BRANCH BLOCK performed by Kayleigh Bernabe MD at [address] History:    Social History     Tobacco Use    Smoking status: Former     Years: 25.00     Types: Cigarettes     Quit date: 2002     Years since quittin.1    Smokeless tobacco: Never   Substance Use Topics    Alcohol use: Yes     Comment: rare                                Counseling given: Not Answered      Vital Signs (Current): There were no vitals filed for this visit.                                            BP Readings from Last 3 Encounters:   23 126/72   23 128/82   23 135/66       NPO Status:  >8.H                                                                               BMI:   Wt Readings from Last 3 Encounters:   23 194 lb (88 kg)   23 191 lb (86.6 kg)   23 191 lb (86.6 kg)     There is no height or weight on file to calculate BMI.    CBC:   Lab Results   Component Value Date/Time    WBC 7.6 2023 10:00 AM    RBC 4.97 2023 10:00 AM    HGB 15.5 2023 10:00 AM    HCT 47.3 2023 10:00 AM    MCV 95.2 2023 10:00 AM    RDW 12.3 2023 10:00 AM     2023 10:00 AM       CMP:   Lab Results   Component Value Date/Time     2023 10:00 AM    K 4.6 2023 10:00 AM     2023 10:00 AM    CO2 21 2023 10:00 AM    BUN 17 2023 10:00 AM    CREATININE 1.1 2023 10:00 AM    GFRAA >60 2022

## 2023-04-03 NOTE — H&P
Test 1 times a day & as needed for symptoms of irregular blood glucose. 22   SUZIE Gonzales CNP   Lancets MISC 1 each by Does not apply route daily 22   SUZIE Gonzales CNP   albuterol sulfate HFA (VENTOLIN HFA) 108 (90 Base) MCG/ACT inhaler Inhale 2 puffs into the lungs 4 times daily as needed for Wheezing 22   Elvis Dorman,    hydrocortisone 2.5 % cream Apply topically 2 times daily to affected areas of skin. 21   Kody Persaud PA-C   Blood Glucose Monitoring Suppl (ACURA BLOOD GLUCOSE METER) w/Device KIT 1 Device by Does not apply route daily  Patient not taking: Reported on 2023   SUZIE Gonzales CNP   Multiple Vitamin (MULTI-VITAMIN) TABS Take 1 tablet by mouth Daily. Historical Provider, MD       Allergies   Allergen Reactions    Benadryl [Diphenhydramine]      Felt weird    Lopid [Gemfibrozil] Other (See Comments)     Enlarged prostate       Social History     Socioeconomic History    Marital status:      Spouse name: Not on file    Number of children: Not on file    Years of education: Not on file    Highest education level: Not on file   Occupational History    Not on file   Tobacco Use    Smoking status: Former     Years: 25.00     Types: Cigarettes     Quit date: 2002     Years since quittin.1    Smokeless tobacco: Never   Vaping Use    Vaping Use: Never used   Substance and Sexual Activity    Alcohol use: Yes     Comment: rare    Drug use: No    Sexual activity: Yes     Partners: Female   Other Topics Concern    Not on file   Social History Narrative    Not on file     Social Determinants of Health     Financial Resource Strain: Low Risk     Difficulty of Paying Living Expenses: Not hard at all   Food Insecurity: No Food Insecurity    Worried About [address] in the Last Year: Never true    [address] [address] in the Last Year: Never true   Transportation Needs: Unknown    Lack of Transportation (Medical):  Not on file

## 2023-04-03 NOTE — DISCHARGE INSTRUCTIONS
questions about a medical condition or this instruction, always ask your healthcare professional. [place] [address] any warranty or liability for your use of this information.

## 2023-04-10 PROBLEM — J10.1 INFLUENZA A: Status: ACTIVE | Noted: 2023-04-10

## 2023-04-21 ENCOUNTER — OFFICE VISIT (OUTPATIENT)
Dept: PAIN MANAGEMENT | Age: 63
End: 2023-04-21
Payer: COMMERCIAL

## 2023-04-21 VITALS
TEMPERATURE: 98.2 F | SYSTOLIC BLOOD PRESSURE: 104 MMHG | HEIGHT: 68 IN | RESPIRATION RATE: 18 BRPM | HEART RATE: 9 BPM | BODY MASS INDEX: 28.79 KG/M2 | OXYGEN SATURATION: 94 % | DIASTOLIC BLOOD PRESSURE: 62 MMHG | WEIGHT: 190 LBS

## 2023-04-21 DIAGNOSIS — M48.061 FORAMINAL STENOSIS OF LUMBAR REGION: ICD-10-CM

## 2023-04-21 DIAGNOSIS — M48.061 SPINAL STENOSIS OF LUMBAR REGION WITHOUT NEUROGENIC CLAUDICATION: ICD-10-CM

## 2023-04-21 DIAGNOSIS — M47.816 LUMBAR SPONDYLOSIS: ICD-10-CM

## 2023-04-21 DIAGNOSIS — M51.9 LUMBAR DISC DISORDER: ICD-10-CM

## 2023-04-21 DIAGNOSIS — M79.18 MYOFASCIAL PAIN SYNDROME: Primary | ICD-10-CM

## 2023-04-21 DIAGNOSIS — M54.16 LUMBAR RADICULOPATHY: ICD-10-CM

## 2023-04-21 DIAGNOSIS — M47.816 LUMBAR FACET ARTHROPATHY: ICD-10-CM

## 2023-04-21 DIAGNOSIS — M48.061 STENOSIS OF LATERAL RECESS OF LUMBAR SPINE: ICD-10-CM

## 2023-04-21 DIAGNOSIS — G89.4 CHRONIC PAIN SYNDROME: ICD-10-CM

## 2023-04-21 PROCEDURE — 99213 OFFICE O/P EST LOW 20 MIN: CPT | Performed by: PAIN MEDICINE

## 2023-04-21 PROCEDURE — 3074F SYST BP LT 130 MM HG: CPT | Performed by: PAIN MEDICINE

## 2023-04-21 PROCEDURE — 3078F DIAST BP <80 MM HG: CPT | Performed by: PAIN MEDICINE

## 2023-04-21 PROCEDURE — G8427 DOCREV CUR MEDS BY ELIG CLIN: HCPCS | Performed by: PAIN MEDICINE

## 2023-04-21 PROCEDURE — G8417 CALC BMI ABV UP PARAM F/U: HCPCS | Performed by: PAIN MEDICINE

## 2023-04-21 PROCEDURE — 99214 OFFICE O/P EST MOD 30 MIN: CPT | Performed by: PAIN MEDICINE

## 2023-04-21 PROCEDURE — 1036F TOBACCO NON-USER: CPT | Performed by: PAIN MEDICINE

## 2023-04-21 PROCEDURE — 3017F COLORECTAL CA SCREEN DOC REV: CPT | Performed by: PAIN MEDICINE

## 2023-04-21 RX ORDER — SODIUM CHLORIDE 0.9 % (FLUSH) 0.9 %
5-40 SYRINGE (ML) INJECTION EVERY 12 HOURS SCHEDULED
OUTPATIENT
Start: 2023-04-21

## 2023-04-21 RX ORDER — SODIUM CHLORIDE 9 MG/ML
INJECTION, SOLUTION INTRAVENOUS PRN
OUTPATIENT
Start: 2023-04-21

## 2023-04-21 RX ORDER — SODIUM CHLORIDE 0.9 % (FLUSH) 0.9 %
5-40 SYRINGE (ML) INJECTION PRN
OUTPATIENT
Start: 2023-04-21

## 2023-04-21 NOTE — PROGRESS NOTES
Laureano Poe presents to the [facility] on 4/21/2023. Sugey López is complaining of pain low back. The pain is constant. The pain is described as aching. Pain is rated on his best day at a 1, on his worst day at a 9, and on average at a 4 on the VAS scale. He took his last dose of Tylenol 4 days ago. Sugey López does not have issues with constipation. Any procedures since your last visit: Yes, with 100 % relief for 3 days    He is not on NSAIDS and  is  on anticoagulation medications to include ASA and is managed by Dr. Parra Estimjessenia. Pacemaker or defibrillator: No   Medication Contract and Consent for Opioid Use Documents Filed        No documents found                       /62   Pulse (!) 9   Temp 98.2 °F (36.8 °C) (Infrared)   Resp 18   Ht 5' 7.5\" (1.715 m)   Wt 190 lb (86.2 kg)   SpO2 94%   BMI 29.32 kg/m²      No LMP for male patient.
(NITROSTAT) 0.4 MG SL tablet DISSOLVE 1 TABLET UNDER THE TONGUE AS NEEDED 2/16/23  Yes SUZIE Kimble CNP   empagliflozin (JARDIANCE) 25 MG tablet Take 1 tablet by mouth daily 1/9/23  Yes SUZIE Brown CNP   dulaglutide (TRULICITY) 1.5 UR/3.3GM SC injection Inject 0.5 mLs into the skin once a week  Patient taking differently: Inject 0.5 mLs into the skin once a week Saturdays 1/9/23  Yes SUZIE Kimble CNP   atenolol (TENORMIN) 25 MG tablet TAKE ONE TABLET BY MOUTH ONCE A DAY. Patient taking differently: Take 1 tablet by mouth daily AM 1/9/23  Yes SUZIE Brown CNP   lisinopril (PRINIVIL;ZESTRIL) 10 MG tablet Take 1 tablet by mouth daily  Patient taking differently: Take 1 tablet by mouth at bedtime 1/9/23  Yes SUZIE Brown CNP   metFORMIN (GLUCOPHAGE-XR) 500 MG extended release tablet Take 1 tablet by mouth 2 times daily (with meals) 1/9/23  Yes SUZIE Brown CNP   fluticasone (FLONASE) 50 MCG/ACT nasal spray 1 spray by Nasal route daily 10/3/22  Yes SUZIE Brown CNP   tamsulosin (FLOMAX) 0.4 MG capsule Take 1 capsule by mouth daily  Patient taking differently: Take 1 capsule by mouth daily as needed 10/3/22  Yes SUZIE Brown CNP   blood glucose monitor strips Test 1 times a day & as needed for symptoms of irregular blood glucose. 6/2/22  Yes SUZIE Brown CNP   Lancets MISC 1 each by Does not apply route daily 6/2/22  Yes SUZIE Kimble CNP   albuterol sulfate HFA (VENTOLIN HFA) 108 (90 Base) MCG/ACT inhaler Inhale 2 puffs into the lungs 4 times daily as needed for Wheezing 5/23/22  Yes Elvis Dorman,    hydrocortisone 2.5 % cream Apply topically 2 times daily to affected areas of skin.  6/8/21  Yes Kody Persaud PA-C   Blood Glucose Monitoring Suppl (ACURA BLOOD GLUCOSE METER) w/Device KIT 1 Device by Does not apply route daily 12/8/20  Yes Kelle Barrera Lunch, APRN - CNP   ondansetron (ZOFRAN-ODT) 4 MG disintegrating tablet Take

## 2023-05-02 ENCOUNTER — OFFICE VISIT (OUTPATIENT)
Dept: FAMILY MEDICINE CLINIC | Age: 63
End: 2023-05-02
Payer: COMMERCIAL

## 2023-05-02 VITALS
RESPIRATION RATE: 18 BRPM | SYSTOLIC BLOOD PRESSURE: 127 MMHG | TEMPERATURE: 97.6 F | HEIGHT: 68 IN | OXYGEN SATURATION: 99 % | DIASTOLIC BLOOD PRESSURE: 74 MMHG | HEART RATE: 68 BPM | BODY MASS INDEX: 28.79 KG/M2 | WEIGHT: 190 LBS

## 2023-05-02 DIAGNOSIS — R68.89 FLU-LIKE SYMPTOMS: ICD-10-CM

## 2023-05-02 DIAGNOSIS — U07.1 COVID: Primary | ICD-10-CM

## 2023-05-02 LAB
INFLUENZA A ANTIGEN, POC: NEGATIVE
INFLUENZA B ANTIGEN, POC: NEGATIVE
LOT EXPIRE DATE: ABNORMAL
LOT KIT NUMBER: ABNORMAL
SARS-COV-2, POC: DETECTED
VALID INTERNAL CONTROL: ABNORMAL
VENDOR AND KIT NAME POC: ABNORMAL

## 2023-05-02 PROCEDURE — G8417 CALC BMI ABV UP PARAM F/U: HCPCS

## 2023-05-02 PROCEDURE — 1036F TOBACCO NON-USER: CPT

## 2023-05-02 PROCEDURE — 99213 OFFICE O/P EST LOW 20 MIN: CPT

## 2023-05-02 PROCEDURE — 3074F SYST BP LT 130 MM HG: CPT

## 2023-05-02 PROCEDURE — G8427 DOCREV CUR MEDS BY ELIG CLIN: HCPCS

## 2023-05-02 PROCEDURE — 87428 SARSCOV & INF VIR A&B AG IA: CPT

## 2023-05-02 PROCEDURE — 3078F DIAST BP <80 MM HG: CPT

## 2023-05-02 PROCEDURE — 3017F COLORECTAL CA SCREEN DOC REV: CPT

## 2023-05-02 RX ORDER — ALBUTEROL SULFATE 90 UG/1
2 AEROSOL, METERED RESPIRATORY (INHALATION) 4 TIMES DAILY PRN
Qty: 18 G | Refills: 0 | Status: SHIPPED | OUTPATIENT
Start: 2023-05-02

## 2023-05-02 RX ORDER — BENZONATATE 200 MG/1
200 CAPSULE ORAL 3 TIMES DAILY PRN
Qty: 30 CAPSULE | Refills: 0 | Status: SHIPPED | OUTPATIENT
Start: 2023-05-02

## 2023-05-02 RX ORDER — PREDNISONE 10 MG/1
10 TABLET ORAL DAILY
Qty: 7 TABLET | Refills: 0 | Status: SHIPPED | OUTPATIENT
Start: 2023-05-02 | End: 2023-05-09

## 2023-05-02 RX ORDER — DOXYCYCLINE HYCLATE 100 MG/1
100 CAPSULE ORAL 2 TIMES DAILY
Qty: 20 CAPSULE | Refills: 0 | Status: SHIPPED | OUTPATIENT
Start: 2023-05-02 | End: 2023-05-12

## 2023-05-02 NOTE — PROGRESS NOTES
Chief Complaint       Cough (Chest congestion and sinus drainage started yesterday)      History of Present Illness   Source of history provided by:  patient. Laila Soria is a 61 y.o. old male presenting to the walk in clinic for evaluation of productive cough and sinus drainage. Denies any SOB, CP, dyspnea, LE edema, abdominal pain, vomiting, rash, or lethargy. Denies any hx of asthma or COPD. Patient denies recent sick exposures. Patient has been vaccinated for COVID-19. Patient has been taking nothing OTC without symptomatic relief. ROS    Unless otherwise stated in this report or unable to obtain because of the patient's clinical or mental status as evidenced by the medical record, this patients's positive and negative responses for Review of Systems, constitutional, psych, eyes, ENT, cardiovascular, respiratory, gastrointestinal, neurological, genitourinary, musculoskeletal, integument systems and systems related to the presenting problem are either stated in the preceding or were not pertinent or were negative for the symptoms and/or complaints related to the medical problem. Physical Exam         VS:  /74   Pulse 68   Temp 97.6 °F (36.4 °C) (Temporal)   Resp 18   Ht 5' 7.5\" (1.715 m)   Wt 190 lb (86.2 kg)   SpO2 99%   BMI 29.32 kg/m²    Oxygen Saturation Interpretation: Normal.    Constitutional:  Alert, development consistent with age. NAD. Head:  NC/NT. Airway patent. Mouth: Posterior pharynx with mild erythema and clear postnasal drip. No tonsillar hypertrophy or exudate. Neck:  Normal ROM. Supple. No anterior cervical adenopathy noted. Lungs: CTAB without wheezes, rales, or rhonchi. CV:  Regular rate and rhythm, normal heart sounds, without pathological murmurs, ectopy, gallops, or rubs. Skin:  Normal turgor. Warm, dry, without visible rash. Lymphatic: No lymphangitis or adenopathy noted. Neurological:  Oriented. Motor functions intact.     DeWittmarshal Guallpa / Charbel Yanez
1 kit, Rfl: 0    Multiple Vitamin (MULTI-VITAMIN) TABS, Take 1 tablet by mouth Daily. , Disp: , Rfl:     Allergies: Allergies   Allergen Reactions    Benadryl [Diphenhydramine]      Felt weird    Lopid [Gemfibrozil] Other (See Comments)     Enlarged prostate       Social History:     Social History     Tobacco Use    Smoking status: Former     Packs/day: 1.00     Years: 25.00     Pack years: 25.00     Types: Cigarettes     Quit date: 2002     Years since quittin.2     Passive exposure: Past    Smokeless tobacco: Never   Vaping Use    Vaping Use: Never used   Substance Use Topics    Alcohol use: Yes     Comment: rare    Drug use: No       Physical Exam:     Vitals:    23 0934   BP: 127/74   Pulse: 68   Resp: 18   Temp: 97.6 °F (36.4 °C)   TempSrc: Temporal   SpO2: 99%   Weight: 190 lb (86.2 kg)   Height: 5' 7.5\" (1.715 m)       Physical Exam (PE)    Physical Exam     Testing:   (All laboratory and radiology results have been personally reviewed by myself)  Labs:  No results found for this visit on 23. Imaging: All Radiology results interpreted by Radiologist unless otherwise noted. No orders to display       Assessment / Plan:   The patient's vitals, allergies, medications, and past medical history have been reviewed. Rosa Fernandez was seen today for cough. Diagnoses and all orders for this visit:    Flu-like symptoms  -     POCT COVID-19 & Influenza A/B        - Disposition: ***    - Educational material printed for patient's review and were included in patient instructions. After Visit Summary was given to patient at the end of visit. - COVID-19 swab obtained and pending, will call with results once available. Advised to follow CDC guidelines. Encouraged oral fluids and rest. Discussed symptomatic treatments with patient today. The patient is to schedule a follow-up with PCP in the next 2-3 days for reevaluation. Red flag symptoms were also discussed with the patient today.  If symptoms

## 2023-05-10 PROBLEM — J10.1 INFLUENZA A: Status: RESOLVED | Noted: 2023-04-10 | Resolved: 2023-05-10

## 2023-05-26 ENCOUNTER — PREP FOR PROCEDURE (OUTPATIENT)
Dept: PAIN MANAGEMENT | Age: 63
End: 2023-05-26

## 2023-05-26 RX ORDER — ICOSAPENT ETHYL 1000 MG/1
2 CAPSULE ORAL 2 TIMES DAILY
COMMUNITY

## 2023-05-31 ENCOUNTER — ANESTHESIA EVENT (OUTPATIENT)
Dept: OPERATING ROOM | Age: 63
End: 2023-05-31
Payer: COMMERCIAL

## 2023-05-31 NOTE — ANESTHESIA PRE PROCEDURE
Last 3 Encounters:   05/02/23 190 lb (86.2 kg)   04/21/23 190 lb (86.2 kg)   04/10/23 190 lb (86.2 kg)     Body mass index is 35.24 kg/m². CBC:   Lab Results   Component Value Date/Time    WBC 7.6 02/24/2023 10:00 AM    RBC 4.97 02/24/2023 10:00 AM    HGB 15.5 02/24/2023 10:00 AM    HCT 47.3 02/24/2023 10:00 AM    MCV 95.2 02/24/2023 10:00 AM    RDW 12.3 02/24/2023 10:00 AM     02/24/2023 10:00 AM       CMP:   Lab Results   Component Value Date/Time     02/24/2023 10:00 AM    K 4.6 02/24/2023 10:00 AM     02/24/2023 10:00 AM    CO2 21 02/24/2023 10:00 AM    BUN 17 02/24/2023 10:00 AM    CREATININE 1.1 02/24/2023 10:00 AM    GFRAA >60 09/26/2022 08:44 AM    LABGLOM >60 02/24/2023 10:00 AM    GLUCOSE 169 04/10/2023 09:15 AM    GLUCOSE 131 04/10/2012 08:49 AM    PROT 7.3 02/24/2023 10:00 AM    CALCIUM 9.4 02/24/2023 10:00 AM    BILITOT 0.6 02/24/2023 10:00 AM    ALKPHOS 103 02/24/2023 10:00 AM    AST 29 02/24/2023 10:00 AM    ALT 51 02/24/2023 10:00 AM       POC Tests: No results for input(s): POCGLU, POCNA, POCK, POCCL, POCBUN, POCHEMO, POCHCT in the last 72 hours.     Coags:   Lab Results   Component Value Date/Time    PROTIME 12.7 01/31/2013 02:35 PM    INR 1.1 01/31/2013 02:35 PM    APTT 25.2 01/31/2013 02:35 PM       HCG (If Applicable): No results found for: PREGTESTUR, PREGSERUM, HCG, HCGQUANT     ABGs: No results found for: PHART, PO2ART, UBN7WAZ, UKD5ZAT, BEART, U2PZIMPB     Type & Screen (If Applicable):  No results found for: LABABO, LABRH    Drug/Infectious Status (If Applicable):  No results found for: HIV, HEPCAB    COVID-19 Screening (If Applicable):   Lab Results   Component Value Date/Time    COVID19 Detected 05/02/2023 10:09 AM    COVID19 Not Detected 05/23/2022 01:40 PM    COVID19 Not Detected 01/27/2021 07:01 AM           Anesthesia Evaluation  Patient summary reviewed  Airway: Mallampati: II          Dental: normal exam         Pulmonary:Negative Pulmonary ROS breath sounds

## 2023-06-01 ENCOUNTER — HOSPITAL ENCOUNTER (OUTPATIENT)
Dept: OPERATING ROOM | Age: 63
Setting detail: OUTPATIENT SURGERY
Discharge: HOME | End: 2023-06-01
Attending: PAIN MEDICINE
Payer: COMMERCIAL

## 2023-06-01 ENCOUNTER — HOSPITAL ENCOUNTER (OUTPATIENT)
Age: 63
Setting detail: OUTPATIENT SURGERY
Discharge: HOME | End: 2023-06-01
Attending: PAIN MEDICINE | Admitting: PAIN MEDICINE
Payer: COMMERCIAL

## 2023-06-01 ENCOUNTER — ANESTHESIA (OUTPATIENT)
Dept: OPERATING ROOM | Age: 63
End: 2023-06-01
Payer: COMMERCIAL

## 2023-06-01 VITALS
WEIGHT: 185 LBS | HEIGHT: 61 IN | DIASTOLIC BLOOD PRESSURE: 65 MMHG | SYSTOLIC BLOOD PRESSURE: 123 MMHG | BODY MASS INDEX: 34.93 KG/M2 | RESPIRATION RATE: 16 BRPM | TEMPERATURE: 98 F | OXYGEN SATURATION: 97 % | HEART RATE: 72 BPM

## 2023-06-01 DIAGNOSIS — M47.896 OTHER OSTEOARTHRITIS OF SPINE, LUMBAR REGION: ICD-10-CM

## 2023-06-01 LAB — METER GLUCOSE: 123 MG/DL (ref 74–99)

## 2023-06-01 PROCEDURE — 82962 GLUCOSE BLOOD TEST: CPT | Performed by: PAIN MEDICINE

## 2023-06-01 PROCEDURE — 3600000015 HC SURGERY LEVEL 5 ADDTL 15MIN: Performed by: PAIN MEDICINE

## 2023-06-01 PROCEDURE — 3700000000 HC ANESTHESIA ATTENDED CARE: Performed by: PAIN MEDICINE

## 2023-06-01 PROCEDURE — 6360000002 HC RX W HCPCS: Performed by: PAIN MEDICINE

## 2023-06-01 PROCEDURE — 3209999900 FLUORO FOR SURGICAL PROCEDURES

## 2023-06-01 PROCEDURE — 7100000011 HC PHASE II RECOVERY - ADDTL 15 MIN: Performed by: PAIN MEDICINE

## 2023-06-01 PROCEDURE — 82962 GLUCOSE BLOOD TEST: CPT

## 2023-06-01 PROCEDURE — 2709999900 HC NON-CHARGEABLE SUPPLY: Performed by: PAIN MEDICINE

## 2023-06-01 PROCEDURE — 7100000010 HC PHASE II RECOVERY - FIRST 15 MIN: Performed by: PAIN MEDICINE

## 2023-06-01 PROCEDURE — 2580000003 HC RX 258: Performed by: ANESTHESIOLOGY

## 2023-06-01 PROCEDURE — 6360000002 HC RX W HCPCS: Performed by: NURSE ANESTHETIST, CERTIFIED REGISTERED

## 2023-06-01 PROCEDURE — 2500000003 HC RX 250 WO HCPCS: Performed by: PAIN MEDICINE

## 2023-06-01 PROCEDURE — 3600000005 HC SURGERY LEVEL 5 BASE: Performed by: PAIN MEDICINE

## 2023-06-01 PROCEDURE — 3700000001 HC ADD 15 MINUTES (ANESTHESIA): Performed by: PAIN MEDICINE

## 2023-06-01 RX ORDER — MIDAZOLAM HYDROCHLORIDE 1 MG/ML
INJECTION INTRAMUSCULAR; INTRAVENOUS PRN
Status: DISCONTINUED | OUTPATIENT
Start: 2023-06-01 | End: 2023-06-01 | Stop reason: SDUPTHER

## 2023-06-01 RX ORDER — FENTANYL CITRATE 50 UG/ML
INJECTION, SOLUTION INTRAMUSCULAR; INTRAVENOUS PRN
Status: DISCONTINUED | OUTPATIENT
Start: 2023-06-01 | End: 2023-06-01 | Stop reason: SDUPTHER

## 2023-06-01 RX ORDER — SODIUM CHLORIDE 0.9 % (FLUSH) 0.9 %
5-40 SYRINGE (ML) INJECTION PRN
Status: DISCONTINUED | OUTPATIENT
Start: 2023-06-01 | End: 2023-06-01 | Stop reason: HOSPADM

## 2023-06-01 RX ORDER — SODIUM CHLORIDE 0.9 % (FLUSH) 0.9 %
5-40 SYRINGE (ML) INJECTION EVERY 12 HOURS SCHEDULED
Status: DISCONTINUED | OUTPATIENT
Start: 2023-06-01 | End: 2023-06-01 | Stop reason: HOSPADM

## 2023-06-01 RX ORDER — SODIUM CHLORIDE 9 MG/ML
INJECTION, SOLUTION INTRAVENOUS PRN
Status: DISCONTINUED | OUTPATIENT
Start: 2023-06-01 | End: 2023-06-01 | Stop reason: HOSPADM

## 2023-06-01 RX ORDER — SODIUM CHLORIDE, SODIUM LACTATE, POTASSIUM CHLORIDE, CALCIUM CHLORIDE 600; 310; 30; 20 MG/100ML; MG/100ML; MG/100ML; MG/100ML
INJECTION, SOLUTION INTRAVENOUS CONTINUOUS
Status: DISCONTINUED | OUTPATIENT
Start: 2023-06-01 | End: 2023-06-01 | Stop reason: HOSPADM

## 2023-06-01 RX ORDER — LIDOCAINE HYDROCHLORIDE 20 MG/ML
INJECTION, SOLUTION EPIDURAL; INFILTRATION; INTRACAUDAL; PERINEURAL PRN
Status: DISCONTINUED | OUTPATIENT
Start: 2023-06-01 | End: 2023-06-01 | Stop reason: ALTCHOICE

## 2023-06-01 RX ADMIN — SODIUM CHLORIDE, POTASSIUM CHLORIDE, SODIUM LACTATE AND CALCIUM CHLORIDE: 600; 310; 30; 20 INJECTION, SOLUTION INTRAVENOUS at 14:15

## 2023-06-01 RX ADMIN — FENTANYL CITRATE 50 MCG: 50 INJECTION INTRAMUSCULAR; INTRAVENOUS at 15:14

## 2023-06-01 RX ADMIN — MIDAZOLAM 2 MG: 1 INJECTION INTRAMUSCULAR; INTRAVENOUS at 15:08

## 2023-06-01 RX ADMIN — FENTANYL CITRATE 50 MCG: 50 INJECTION INTRAMUSCULAR; INTRAVENOUS at 15:10

## 2023-06-01 RX ADMIN — MIDAZOLAM 2 MG: 1 INJECTION INTRAMUSCULAR; INTRAVENOUS at 15:15

## 2023-06-01 ASSESSMENT — PAIN - FUNCTIONAL ASSESSMENT: PAIN_FUNCTIONAL_ASSESSMENT: NONE - DENIES PAIN

## 2023-06-01 NOTE — ANESTHESIA POSTPROCEDURE EVALUATION
Department of Anesthesiology  Postprocedure Note    Patient: Que Hood  MRN: [id number]  YOB: 1960  Date of evaluation: 6/1/2023      Procedure Summary     Date: 06/01/23 Room / Location: [address] [place] [address] 04 / 4199 [facility]    Anesthesia Start: 5617 Anesthesia Stop: 0107    Procedure: BILATERAL L3,4,5 MEDIAL BRANCH RADIOFREQUENCY ABLATION (Bilateral) Diagnosis:       Lumbar spondylosis      (Lumbar spondylosis [C44.765])    Surgeons: Le Dwyer MD Responsible Provider: Zuleyka Najera MD    Anesthesia Type: MAC ASA Status: 3          Anesthesia Type: MAC    Bradford Phase I: Bradford Score: 10    Bradford Phase II:        Anesthesia Post Evaluation

## 2023-06-01 NOTE — DISCHARGE INSTRUCTIONS
medical condition or this instruction, always ask your healthcare professional. [place] [address] any warranty or liability for your use of this information.

## 2023-06-01 NOTE — H&P
[address]  [address] [facility], [address] [place] [address] Chris  [phone]    Procedure History & Physical      Homa Palm     HPI:    Patient  is here for axial low back pain for bilateral L3,4,5 MB RFA  Labs/imaging studies reviewed   All question and concerns addressed including R/B/A associated with the procedure    Past Medical History:   Diagnosis Date    Allergic rhinitis due to allergen 10/03/2022    Back skin lesion 05/01/2017    Benign paroxysmal positional vertigo due to bilateral vestibular disorder 09/27/2021    BPPV (benign paroxysmal positional vertigo), bilateral 09/27/2021    CAD (coronary artery disease)     Cervical radiculopathy     Bilateral upper extemities    Corns and callus 07/09/2020    COVID     mild symptoms    may 2 2023 mild cough    Degeneration, intervertebral disc, cervical     C4-C5 through C6-C7 levels    Diabetes mellitus (HCC)     Headache(784.0)     Hyperlipidemia     Hypertension     Low back pain radiating to left lower extremity 02/17/2020    NAFLD (nonalcoholic fatty liver disease) 01/09/2023    Peptic ulcer 01/21/2020    Pressure in left side of chest 06/18/2021    Spinal stenosis     Congenital     Urinary frequency 02/25/2022       Past Surgical History:   Procedure Laterality Date    ANUS SURGERY  02/2013    Excision Rianna-anal condyloma, anoscopy    CARDIAC CATHETERIZATION  02/15/2023    done @ [facility]-dr bautista    very mild CAD    CHOLECYSTECTOMY, LAPAROSCOPIC  2010    COLONOSCOPY      ENDOSCOPY, COLON, DIAGNOSTIC      LYMPH NODE BIOPSY      benign    NERVE BLOCK Left 12/14/2020    LEFT L4 AND L5 TRANSFORAMINAL EPIDURAL STEROID INJECTION (CPT 16400) performed by Carolynn Lawson MD at [address] [place] [address] 02/01/2021    TFESI    NERVE BLOCK Left 02/01/2021    LEFT L4 AND L5 TRANSFORAMINAL EPIDURAL STEROID INJECTION WITH SEDATION (CPT 93408) performed by Carolynn Lawson MD at [address]
Fall with Harm Risk

## 2023-06-01 NOTE — ANESTHESIA POSTPROCEDURE EVALUATION
Department of Anesthesiology  Postprocedure Note    Patient: Yan Bradshaw  MRN: [id number]  YOB: 1960  Date of evaluation: 6/1/2023      Procedure Summary     Date: 06/01/23 Room / Location: [address] [place] [address] 04 / [address] [facility]    Anesthesia Start: 7507 Anesthesia Stop: 2274    Procedure: BILATERAL L3,4,5 MEDIAL BRANCH RADIOFREQUENCY ABLATION (Bilateral) Diagnosis:       Lumbar spondylosis      (Lumbar spondylosis [O98.066])    Surgeons: Edwin Dorado MD Responsible Provider: Carlos Zuleta MD    Anesthesia Type: MAC ASA Status: 3          Anesthesia Type: MAC    Bradford Phase I: Bradford Score: 10    Bradford Phase II:        Anesthesia Post Evaluation    Patient location during evaluation: PACU  Patient participation: complete - patient participated  Level of consciousness: awake and alert  Pain score: 3  Airway patency: patent  Nausea & Vomiting: no nausea  Complications: no  Cardiovascular status: blood pressure returned to baseline  Respiratory status: acceptable  Hydration status: euvolemic

## 2023-06-01 NOTE — OP NOTE
seconds at 90 degrees centigrade. Once the lesions were complete, a solution of 0.25% marcaine 3 cc and 40 mg DepoMedrol was injected and distributed equally through each probe. The probes were removed . The patient's back was cleaned and bandages were placed over the needle insertion sites. Disposition the patient tolerated the procedure well and there were no complications . Vital signs remained stable throughout the procedure. The patient was escorted to the recovery area where they remained until discharge and written discharge instructions for the procedure were given. Plan: Jose Cruz Barroso will return to our pain management center as scheduled.      Roxann Evans MD

## 2023-06-16 PROBLEM — M50.90 CERVICAL DISC DISORDER: Status: ACTIVE | Noted: 2023-06-16

## 2023-06-16 PROBLEM — M48.061 SPINAL STENOSIS OF LUMBAR REGION WITHOUT NEUROGENIC CLAUDICATION: Status: ACTIVE | Noted: 2023-06-16

## 2023-06-16 PROBLEM — M79.18 MYOFASCIAL PAIN SYNDROME: Status: ACTIVE | Noted: 2023-06-16

## 2023-06-16 PROBLEM — M47.812 CERVICAL FACET JOINT SYNDROME: Status: ACTIVE | Noted: 2023-06-16

## 2023-06-16 PROBLEM — M47.812 CERVICAL SPONDYLOSIS: Status: ACTIVE | Noted: 2023-06-16

## 2023-06-16 PROBLEM — M48.02 CERVICAL STENOSIS OF SPINE: Status: ACTIVE | Noted: 2023-06-16

## 2023-06-24 ENCOUNTER — HOSPITAL ENCOUNTER (OUTPATIENT)
Dept: MRI IMAGING | Age: 63
End: 2023-06-24
Attending: PAIN MEDICINE
Payer: COMMERCIAL

## 2023-06-24 DIAGNOSIS — M48.02 CERVICAL STENOSIS OF SPINE: ICD-10-CM

## 2023-06-24 PROCEDURE — 72141 MRI NECK SPINE W/O DYE: CPT

## 2023-07-07 ENCOUNTER — OFFICE VISIT (OUTPATIENT)
Dept: PAIN MANAGEMENT | Age: 63
End: 2023-07-07
Payer: COMMERCIAL

## 2023-07-07 ENCOUNTER — OFFICE VISIT (OUTPATIENT)
Dept: FAMILY MEDICINE CLINIC | Age: 63
End: 2023-07-07
Payer: COMMERCIAL

## 2023-07-07 VITALS
BODY MASS INDEX: 30.29 KG/M2 | SYSTOLIC BLOOD PRESSURE: 136 MMHG | HEIGHT: 67 IN | RESPIRATION RATE: 18 BRPM | TEMPERATURE: 97.7 F | DIASTOLIC BLOOD PRESSURE: 78 MMHG | OXYGEN SATURATION: 96 % | WEIGHT: 193 LBS | HEART RATE: 59 BPM

## 2023-07-07 VITALS
HEIGHT: 67 IN | WEIGHT: 193 LBS | TEMPERATURE: 97.4 F | DIASTOLIC BLOOD PRESSURE: 70 MMHG | OXYGEN SATURATION: 96 % | HEART RATE: 64 BPM | BODY MASS INDEX: 30.29 KG/M2 | SYSTOLIC BLOOD PRESSURE: 130 MMHG

## 2023-07-07 DIAGNOSIS — M25.511 ACUTE PAIN OF RIGHT SHOULDER: Primary | ICD-10-CM

## 2023-07-07 DIAGNOSIS — M47.816 LUMBAR FACET ARTHROPATHY: ICD-10-CM

## 2023-07-07 DIAGNOSIS — M50.90 CERVICAL DISC DISORDER: ICD-10-CM

## 2023-07-07 DIAGNOSIS — G89.4 CHRONIC PAIN SYNDROME: ICD-10-CM

## 2023-07-07 DIAGNOSIS — M89.8X1 COLLAR BONE PAIN: ICD-10-CM

## 2023-07-07 DIAGNOSIS — M48.061 FORAMINAL STENOSIS OF LUMBAR REGION: ICD-10-CM

## 2023-07-07 DIAGNOSIS — M54.16 LUMBAR RADICULOPATHY: ICD-10-CM

## 2023-07-07 DIAGNOSIS — M48.061 SPINAL STENOSIS OF LUMBAR REGION WITHOUT NEUROGENIC CLAUDICATION: ICD-10-CM

## 2023-07-07 DIAGNOSIS — M47.816 LUMBAR SPONDYLOSIS: ICD-10-CM

## 2023-07-07 DIAGNOSIS — M51.9 LUMBAR DISC DISORDER: ICD-10-CM

## 2023-07-07 DIAGNOSIS — M47.812 CERVICAL FACET JOINT SYNDROME: ICD-10-CM

## 2023-07-07 DIAGNOSIS — M48.061 STENOSIS OF LATERAL RECESS OF LUMBAR SPINE: Primary | ICD-10-CM

## 2023-07-07 PROCEDURE — G8417 CALC BMI ABV UP PARAM F/U: HCPCS | Performed by: PAIN MEDICINE

## 2023-07-07 PROCEDURE — 1036F TOBACCO NON-USER: CPT | Performed by: PAIN MEDICINE

## 2023-07-07 PROCEDURE — 3075F SYST BP GE 130 - 139MM HG: CPT

## 2023-07-07 PROCEDURE — 99213 OFFICE O/P EST LOW 20 MIN: CPT

## 2023-07-07 PROCEDURE — G8427 DOCREV CUR MEDS BY ELIG CLIN: HCPCS

## 2023-07-07 PROCEDURE — 3078F DIAST BP <80 MM HG: CPT | Performed by: PAIN MEDICINE

## 2023-07-07 PROCEDURE — G8417 CALC BMI ABV UP PARAM F/U: HCPCS

## 2023-07-07 PROCEDURE — G8427 DOCREV CUR MEDS BY ELIG CLIN: HCPCS | Performed by: PAIN MEDICINE

## 2023-07-07 PROCEDURE — 3017F COLORECTAL CA SCREEN DOC REV: CPT | Performed by: PAIN MEDICINE

## 2023-07-07 PROCEDURE — 3078F DIAST BP <80 MM HG: CPT

## 2023-07-07 PROCEDURE — 3017F COLORECTAL CA SCREEN DOC REV: CPT

## 2023-07-07 PROCEDURE — 3075F SYST BP GE 130 - 139MM HG: CPT | Performed by: PAIN MEDICINE

## 2023-07-07 PROCEDURE — 99214 OFFICE O/P EST MOD 30 MIN: CPT | Performed by: PAIN MEDICINE

## 2023-07-07 PROCEDURE — 1036F TOBACCO NON-USER: CPT

## 2023-07-07 RX ORDER — GABAPENTIN 300 MG/1
300 CAPSULE ORAL 2 TIMES DAILY
Qty: 60 CAPSULE | Refills: 0 | Status: SHIPPED | OUTPATIENT
Start: 2023-07-07 | End: 2023-08-06

## 2023-07-07 NOTE — PROGRESS NOTES
23  Alta Lawson : 1960 Sex: male  Age 61 y.o. Subjective:  Chief Complaint   Patient presents with    Shoulder Injury     Fell 5 weeks ago shoulder started hurting about two weeks ago. HPI:   Alta Lawson , 61 y.o. male presents to the clinic for evaluation of right shoulder x 14 days. The patient reports associated pain began after a fall. The patient fell as the known injury. The patient is radiating. The patient reports symptoms worsen with activity / ROM. The patient has taken tylenol for symptoms. The patient reports worsening symptoms over time. Denies any erythema, edema, ecchymosis, paresthesia, and loss of ROM / strength. The patient also denies headache, fever, chest pain, abdominal pain, shortness of breath, and nausea / vomiting / diarrhea. ROS:   Unless otherwise stated in this report the patient's positive and negative responses for review of systems for constitutional, eyes, ENT, cardiovascular, respiratory, gastrointestinal, neurological, , musculoskeletal, and integument systems and related systems to the presenting problem are either stated in the history of present illness or were not pertinent or were negative for the symptoms and/or complaints related to the presenting medical problem. Positives and pertinent negatives as per HPI. All others reviewed and are negative.       PMH:     Past Medical History:   Diagnosis Date    Allergic rhinitis due to allergen 10/03/2022    Back skin lesion 2017    Benign paroxysmal positional vertigo due to bilateral vestibular disorder 2021    BPPV (benign paroxysmal positional vertigo), bilateral 2021    CAD (coronary artery disease)     Cervical radiculopathy     Bilateral upper extemities    Corns and callus 2020    COVID     mild symptoms -2021   may 2 2023 mild cough    Degeneration, intervertebral disc, cervical     C4-C5 through C6-C7 levels    Diabetes mellitus (HCC)     Headache(784.0)

## 2023-07-16 DIAGNOSIS — E11.9 TYPE 2 DIABETES MELLITUS WITHOUT COMPLICATION, WITHOUT LONG-TERM CURRENT USE OF INSULIN (HCC): ICD-10-CM

## 2023-07-17 RX ORDER — CALCIUM CITRATE/VITAMIN D3 200MG-6.25
TABLET ORAL
Refills: 0 | OUTPATIENT
Start: 2023-07-17

## 2023-07-31 ENCOUNTER — OFFICE VISIT (OUTPATIENT)
Dept: FAMILY MEDICINE CLINIC | Age: 63
End: 2023-07-31
Payer: COMMERCIAL

## 2023-07-31 VITALS
RESPIRATION RATE: 16 BRPM | OXYGEN SATURATION: 98 % | WEIGHT: 197.1 LBS | DIASTOLIC BLOOD PRESSURE: 78 MMHG | BODY MASS INDEX: 29.87 KG/M2 | SYSTOLIC BLOOD PRESSURE: 128 MMHG | HEART RATE: 68 BPM | HEIGHT: 68 IN | TEMPERATURE: 97.5 F

## 2023-07-31 DIAGNOSIS — E11.22 TYPE 2 DIABETES MELLITUS WITH STAGE 2 CHRONIC KIDNEY DISEASE, WITHOUT LONG-TERM CURRENT USE OF INSULIN (HCC): Primary | ICD-10-CM

## 2023-07-31 DIAGNOSIS — G89.29 CHRONIC RIGHT SHOULDER PAIN: ICD-10-CM

## 2023-07-31 DIAGNOSIS — E78.2 MIXED HYPERLIPIDEMIA: ICD-10-CM

## 2023-07-31 DIAGNOSIS — M25.511 CHRONIC RIGHT SHOULDER PAIN: ICD-10-CM

## 2023-07-31 DIAGNOSIS — I10 ESSENTIAL HYPERTENSION: ICD-10-CM

## 2023-07-31 DIAGNOSIS — N18.2 TYPE 2 DIABETES MELLITUS WITH STAGE 2 CHRONIC KIDNEY DISEASE, WITHOUT LONG-TERM CURRENT USE OF INSULIN (HCC): Primary | ICD-10-CM

## 2023-07-31 LAB
CHP ED QC CHECK: ABNORMAL
GLUCOSE BLD-MCNC: 153 MG/DL
HBA1C MFR BLD: 6.8 %

## 2023-07-31 PROCEDURE — 3078F DIAST BP <80 MM HG: CPT | Performed by: NURSE PRACTITIONER

## 2023-07-31 PROCEDURE — G8417 CALC BMI ABV UP PARAM F/U: HCPCS | Performed by: NURSE PRACTITIONER

## 2023-07-31 PROCEDURE — 3017F COLORECTAL CA SCREEN DOC REV: CPT | Performed by: NURSE PRACTITIONER

## 2023-07-31 PROCEDURE — 1036F TOBACCO NON-USER: CPT | Performed by: NURSE PRACTITIONER

## 2023-07-31 PROCEDURE — 2022F DILAT RTA XM EVC RTNOPTHY: CPT | Performed by: NURSE PRACTITIONER

## 2023-07-31 PROCEDURE — 83037 HB GLYCOSYLATED A1C HOME DEV: CPT | Performed by: NURSE PRACTITIONER

## 2023-07-31 PROCEDURE — 82962 GLUCOSE BLOOD TEST: CPT | Performed by: NURSE PRACTITIONER

## 2023-07-31 PROCEDURE — 99214 OFFICE O/P EST MOD 30 MIN: CPT | Performed by: NURSE PRACTITIONER

## 2023-07-31 PROCEDURE — 3074F SYST BP LT 130 MM HG: CPT | Performed by: NURSE PRACTITIONER

## 2023-07-31 PROCEDURE — 3044F HG A1C LEVEL LT 7.0%: CPT | Performed by: NURSE PRACTITIONER

## 2023-07-31 PROCEDURE — G8427 DOCREV CUR MEDS BY ELIG CLIN: HCPCS | Performed by: NURSE PRACTITIONER

## 2023-07-31 RX ORDER — LISINOPRIL 10 MG/1
10 TABLET ORAL DAILY
Qty: 90 TABLET | Refills: 1 | Status: SHIPPED | OUTPATIENT
Start: 2023-07-31

## 2023-07-31 RX ORDER — METFORMIN HYDROCHLORIDE 500 MG/1
500 TABLET, EXTENDED RELEASE ORAL 2 TIMES DAILY WITH MEALS
Qty: 180 TABLET | Refills: 1 | Status: SHIPPED | OUTPATIENT
Start: 2023-07-31

## 2023-07-31 RX ORDER — GLUCOSAMINE HCL/CHONDROITIN SU 500-400 MG
CAPSULE ORAL
Qty: 100 STRIP | Refills: 3 | Status: SHIPPED | OUTPATIENT
Start: 2023-07-31

## 2023-07-31 RX ORDER — ATENOLOL 25 MG/1
TABLET ORAL
Qty: 90 TABLET | Refills: 1 | Status: SHIPPED | OUTPATIENT
Start: 2023-07-31

## 2023-07-31 RX ORDER — LANCETS 30 GAUGE
1 EACH MISCELLANEOUS DAILY
Qty: 100 EACH | Refills: 5 | Status: SHIPPED | OUTPATIENT
Start: 2023-07-31

## 2023-07-31 ASSESSMENT — ENCOUNTER SYMPTOMS
CHEST TIGHTNESS: 0
NAUSEA: 0
SHORTNESS OF BREATH: 0
SORE THROAT: 0
VOICE CHANGE: 0
COLOR CHANGE: 0
SINUS PRESSURE: 0
CONSTIPATION: 0
SINUS PAIN: 0
TROUBLE SWALLOWING: 0
RHINORRHEA: 0
VOMITING: 0
COUGH: 0
ABDOMINAL PAIN: 0
BACK PAIN: 1
WHEEZING: 0
DIARRHEA: 0
FACIAL SWELLING: 0

## 2023-07-31 NOTE — ASSESSMENT & PLAN NOTE
well controlled, no significant side effects from medication noted. Continue lisinopril 10 mg daily, atenolol 25 mg daily labs reviewed:CMP, CBCD, Lipids,    -Discussed taking medication as directed every day around the same time. Do not double up if skipped or missed doses. -Discussed exercising daily 30 minutes 5 times a week for 150 minutes weekly to help with stress reduction and weight reduction if needed.  -Discussed low sodium diet.  -Discussed limiting caffeine consumption and tobacco cessation and the effects they have on the heart and blood pressure.

## 2023-07-31 NOTE — ASSESSMENT & PLAN NOTE
well controlled and no significant medication side effects noted, Continue Atorvastatin 40 mg nightly check fasting labs with reduction in dose  labs reviewed Lipid, CMP, CBCD,   -Discussed low fat diet, limit fast food, goodies, breads and pastas if consuming several days a week,  limit alcohol consumption as this combined with statin can cause liver problems.  -Discussed weight reduction and exercise 30 minutes 5 days a week for total of 150 minutes weekly.  -Discussed CoQ10 as directed  -Discussed if any unusual muscle aching/pain to contact the office, discussed medication and risk of muscle pain/damage from Rhabdomyolysis. -Discussed repeat labs in 8 weeks.

## 2023-07-31 NOTE — ASSESSMENT & PLAN NOTE
reasonably well controlled and no significant medication side effects noted A1c 6.8  %, continue Trulicity 1.5 mg weekly, jardiance 25 mg daily, metformin  mg BID., labs reviewed CMP, CBCD, lipids stable  BS log reviewed with patient   Monitor BS at different times: 1 day fasting, then next day 2 hours after lunch, next day 2 hours after dinner, next day at bedtime then start over and log all values. Bring log to next appointment  Discussed hypoglycemia prevention and treatment, take medication regularly don't skip doses or double up. Foot exam every day; wash and dry well between toes, look for any redness, cracks, wounds notify provider if any problems occur. Be sure to wear hard sole shoe at all times and not go barefooted. Wear proper fitting shoes to prevent ulcer, callous formation. Reminder for Podiatry visits Q 2 Months for toenail care if needed  Reminder for annual Eye exam  Reminder to keep vaccines up dated  Exercise 30 minutes daily for 150 minutes a week  Recommend Diabetic Education Classes if you have not already attended  Stay up to date on all immunizations.

## 2023-08-04 DIAGNOSIS — I10 ESSENTIAL HYPERTENSION: ICD-10-CM

## 2023-08-04 DIAGNOSIS — E11.22 TYPE 2 DIABETES MELLITUS WITH STAGE 2 CHRONIC KIDNEY DISEASE, WITHOUT LONG-TERM CURRENT USE OF INSULIN (HCC): ICD-10-CM

## 2023-08-04 DIAGNOSIS — N18.2 TYPE 2 DIABETES MELLITUS WITH STAGE 2 CHRONIC KIDNEY DISEASE, WITHOUT LONG-TERM CURRENT USE OF INSULIN (HCC): ICD-10-CM

## 2023-08-04 DIAGNOSIS — E78.2 MIXED HYPERLIPIDEMIA: ICD-10-CM

## 2023-08-04 LAB
ABSOLUTE IMMATURE GRANULOCYTE: 0.05 K/UL (ref 0–0.58)
ALBUMIN SERPL-MCNC: 4.4 G/DL (ref 3.5–5.2)
ALP BLD-CCNC: 84 U/L (ref 40–129)
ALT SERPL-CCNC: 34 U/L (ref 0–40)
ANION GAP SERPL CALCULATED.3IONS-SCNC: 13 MMOL/L (ref 7–16)
AST SERPL-CCNC: 21 U/L (ref 0–39)
BASOPHILS ABSOLUTE: 0.11 K/UL (ref 0–0.2)
BASOPHILS RELATIVE PERCENT: 2 % (ref 0–2)
BILIRUB SERPL-MCNC: 0.4 MG/DL (ref 0–1.2)
BUN BLDV-MCNC: 13 MG/DL (ref 6–23)
CALCIUM SERPL-MCNC: 9.1 MG/DL (ref 8.6–10.2)
CHLORIDE BLD-SCNC: 104 MMOL/L (ref 98–107)
CHOLESTEROL: 135 MG/DL
CO2: 23 MMOL/L (ref 22–29)
CREAT SERPL-MCNC: 1.2 MG/DL (ref 0.7–1.2)
EOSINOPHILS ABSOLUTE: 0.29 K/UL (ref 0.05–0.5)
EOSINOPHILS RELATIVE PERCENT: 4 % (ref 0–6)
GFR SERPL CREATININE-BSD FRML MDRD: >60 ML/MIN/1.73M2
GLUCOSE BLD-MCNC: 159 MG/DL (ref 74–99)
HCT VFR BLD CALC: 47.4 % (ref 37–54)
HDLC SERPL-MCNC: 46 MG/DL
HEMOGLOBIN: 15.1 G/DL (ref 12.5–16.5)
IMMATURE GRANULOCYTES: 1 % (ref 0–5)
LDL CHOLESTEROL: 47 MG/DL
LYMPHOCYTES ABSOLUTE: 1.74 K/UL (ref 1.5–4)
LYMPHOCYTES RELATIVE PERCENT: 26 % (ref 20–42)
MCH RBC QN AUTO: 30.9 PG (ref 26–35)
MCHC RBC AUTO-ENTMCNC: 31.9 G/DL (ref 32–34.5)
MCV RBC AUTO: 96.9 FL (ref 80–99.9)
MONOCYTES ABSOLUTE: 0.57 K/UL (ref 0.1–0.95)
MONOCYTES RELATIVE PERCENT: 9 % (ref 2–12)
NEUTROPHILS ABSOLUTE: 3.89 K/UL (ref 1.8–7.3)
NEUTROPHILS RELATIVE PERCENT: 58 % (ref 43–80)
PDW BLD-RTO: 12.6 % (ref 11.5–15)
PLATELET # BLD: 204 K/UL (ref 130–450)
PMV BLD AUTO: 12.5 FL (ref 7–12)
POTASSIUM SERPL-SCNC: 4.5 MMOL/L (ref 3.5–5)
RBC # BLD: 4.89 M/UL (ref 3.8–5.8)
SODIUM BLD-SCNC: 140 MMOL/L (ref 132–146)
TOTAL PROTEIN: 6.7 G/DL (ref 6.4–8.3)
TRIGL SERPL-MCNC: 208 MG/DL
VLDLC SERPL CALC-MCNC: 42 MG/DL
WBC # BLD: 6.7 K/UL (ref 4.5–11.5)

## 2023-08-11 ENCOUNTER — OFFICE VISIT (OUTPATIENT)
Dept: PAIN MANAGEMENT | Age: 63
End: 2023-08-11
Payer: COMMERCIAL

## 2023-08-11 VITALS
RESPIRATION RATE: 18 BRPM | TEMPERATURE: 97.7 F | SYSTOLIC BLOOD PRESSURE: 126 MMHG | HEART RATE: 66 BPM | DIASTOLIC BLOOD PRESSURE: 80 MMHG | HEIGHT: 67 IN | OXYGEN SATURATION: 96 % | BODY MASS INDEX: 30.92 KG/M2 | WEIGHT: 197 LBS

## 2023-08-11 DIAGNOSIS — M54.16 LUMBAR RADICULOPATHY: ICD-10-CM

## 2023-08-11 DIAGNOSIS — M48.061 STENOSIS OF LATERAL RECESS OF LUMBAR SPINE: ICD-10-CM

## 2023-08-11 DIAGNOSIS — M25.552 PAIN IN LEFT HIP: ICD-10-CM

## 2023-08-11 DIAGNOSIS — M47.816 LUMBAR SPONDYLOSIS: ICD-10-CM

## 2023-08-11 DIAGNOSIS — G89.4 CHRONIC PAIN SYNDROME: Primary | ICD-10-CM

## 2023-08-11 DIAGNOSIS — M48.061 FORAMINAL STENOSIS OF LUMBAR REGION: ICD-10-CM

## 2023-08-11 DIAGNOSIS — M48.061 SPINAL STENOSIS OF LUMBAR REGION WITHOUT NEUROGENIC CLAUDICATION: ICD-10-CM

## 2023-08-11 DIAGNOSIS — M47.812 CERVICAL FACET JOINT SYNDROME: ICD-10-CM

## 2023-08-11 DIAGNOSIS — M51.9 LUMBAR DISC DISORDER: ICD-10-CM

## 2023-08-11 DIAGNOSIS — M50.90 CERVICAL DISC DISORDER: ICD-10-CM

## 2023-08-11 DIAGNOSIS — M47.816 LUMBAR FACET ARTHROPATHY: ICD-10-CM

## 2023-08-11 PROCEDURE — 99213 OFFICE O/P EST LOW 20 MIN: CPT | Performed by: PAIN MEDICINE

## 2023-08-11 PROCEDURE — 3079F DIAST BP 80-89 MM HG: CPT | Performed by: PAIN MEDICINE

## 2023-08-11 PROCEDURE — G8417 CALC BMI ABV UP PARAM F/U: HCPCS | Performed by: PAIN MEDICINE

## 2023-08-11 PROCEDURE — G8427 DOCREV CUR MEDS BY ELIG CLIN: HCPCS | Performed by: PAIN MEDICINE

## 2023-08-11 PROCEDURE — 3074F SYST BP LT 130 MM HG: CPT | Performed by: PAIN MEDICINE

## 2023-08-11 PROCEDURE — 1036F TOBACCO NON-USER: CPT | Performed by: PAIN MEDICINE

## 2023-08-11 PROCEDURE — 3017F COLORECTAL CA SCREEN DOC REV: CPT | Performed by: PAIN MEDICINE

## 2023-08-11 NOTE — PROGRESS NOTES
[address]  [phone]    Follow up Note      Lloyd Richardson     Date of Visit:  8/11/2023    CC:  Patient presents for follow up   Chief Complaint   Patient presents with    Neck Pain    Follow-up     After starting Gabapentin     HPI:    Follow up on his neck and low back pain with complaints of worsening left hip pain. Appropriate analgesia with current medications regimen:N/A. Change in quality of symptoms:no. Medication side effects:not applicable . Recent diagnostic testing:none  Recent interventional procedures:none     He has not been on anticoagulation medications to include none. The patient  has not been on herbal supplements. The patient is diabetic. Imaging:   Lumbar spine MRI 2020:  Degenerative disc disease as described above. Spinal canal narrowing, moderate at L4-5. Narrowing of the bilateral lateral recesses at L4-5, contacting left exiting    L4 and left descending L5 nerve roots, and contacting the right descending L5    nerve root. Foraminal narrowing, severe at left L5-S1, moderate to severe at right L5-S1,    moderate at bilateral L4-5, mild at left L3-4. Cervical spine MRI 2023:  1. Interval development of a disc herniation at C7-T1 level. Disc  protrusion along the posterior aspect of the C7 nerve root is noted effacing  the ventral subarachnoid space. No evidence for spinal cord compression. 2.  Multilevel disc disease as noted previously. The degenerative changes  may impinge on the exiting left C4, bilateral C5, bilateral C6 , bilateral C7  and left C8 nerve roots. Cervical spine MRI 2011:  1. Degenerative disc and spondylitic changes greatest from C4-C5   through C6-C7 levels. This in combination with congenital spinal canal   narrowing results in varying degrees of spinal canal or foraminal   stenosis as described in detail above.    2. Normal

## 2023-10-20 ENCOUNTER — OFFICE VISIT (OUTPATIENT)
Dept: FAMILY MEDICINE CLINIC | Age: 63
End: 2023-10-20
Payer: COMMERCIAL

## 2023-10-20 VITALS
TEMPERATURE: 97.4 F | HEIGHT: 67 IN | SYSTOLIC BLOOD PRESSURE: 134 MMHG | OXYGEN SATURATION: 98 % | DIASTOLIC BLOOD PRESSURE: 90 MMHG | WEIGHT: 197 LBS | HEART RATE: 58 BPM | RESPIRATION RATE: 17 BRPM | BODY MASS INDEX: 30.92 KG/M2

## 2023-10-20 DIAGNOSIS — M25.551 RIGHT HIP PAIN: Primary | ICD-10-CM

## 2023-10-20 DIAGNOSIS — M89.8X1 COLLAR BONE PAIN: ICD-10-CM

## 2023-10-20 PROCEDURE — 3075F SYST BP GE 130 - 139MM HG: CPT

## 2023-10-20 PROCEDURE — G8427 DOCREV CUR MEDS BY ELIG CLIN: HCPCS

## 2023-10-20 PROCEDURE — 3080F DIAST BP >= 90 MM HG: CPT

## 2023-10-20 PROCEDURE — 1036F TOBACCO NON-USER: CPT

## 2023-10-20 PROCEDURE — 99213 OFFICE O/P EST LOW 20 MIN: CPT

## 2023-10-20 PROCEDURE — 3017F COLORECTAL CA SCREEN DOC REV: CPT

## 2023-10-20 PROCEDURE — G8484 FLU IMMUNIZE NO ADMIN: HCPCS

## 2023-10-20 PROCEDURE — G8417 CALC BMI ABV UP PARAM F/U: HCPCS

## 2023-11-01 ENCOUNTER — OFFICE VISIT (OUTPATIENT)
Dept: FAMILY MEDICINE CLINIC | Age: 63
End: 2023-11-01
Payer: COMMERCIAL

## 2023-11-01 VITALS
DIASTOLIC BLOOD PRESSURE: 84 MMHG | OXYGEN SATURATION: 97 % | HEIGHT: 68 IN | HEART RATE: 77 BPM | SYSTOLIC BLOOD PRESSURE: 132 MMHG | TEMPERATURE: 98.1 F | RESPIRATION RATE: 16 BRPM | WEIGHT: 195.9 LBS | BODY MASS INDEX: 29.69 KG/M2

## 2023-11-01 DIAGNOSIS — M70.61 TROCHANTERIC BURSITIS OF RIGHT HIP: Primary | ICD-10-CM

## 2023-11-01 DIAGNOSIS — I10 ESSENTIAL HYPERTENSION: ICD-10-CM

## 2023-11-01 DIAGNOSIS — J30.2 SEASONAL ALLERGIC RHINITIS, UNSPECIFIED TRIGGER: ICD-10-CM

## 2023-11-01 PROCEDURE — 99214 OFFICE O/P EST MOD 30 MIN: CPT | Performed by: FAMILY MEDICINE

## 2023-11-01 PROCEDURE — 3078F DIAST BP <80 MM HG: CPT | Performed by: FAMILY MEDICINE

## 2023-11-01 PROCEDURE — 3074F SYST BP LT 130 MM HG: CPT | Performed by: FAMILY MEDICINE

## 2023-11-01 RX ORDER — NAPROXEN 250 MG/1
500 TABLET ORAL 2 TIMES DAILY WITH MEALS
Qty: 60 TABLET | Refills: 0 | Status: SHIPPED | OUTPATIENT
Start: 2023-11-01

## 2023-11-01 RX ORDER — AZELASTINE 1 MG/ML
2 SPRAY, METERED NASAL 2 TIMES DAILY
Qty: 120 ML | Refills: 1 | Status: SHIPPED | OUTPATIENT
Start: 2023-11-01

## 2023-11-20 ENCOUNTER — OFFICE VISIT (OUTPATIENT)
Dept: FAMILY MEDICINE CLINIC | Age: 63
End: 2023-11-20
Payer: COMMERCIAL

## 2023-11-20 VITALS
HEART RATE: 69 BPM | RESPIRATION RATE: 17 BRPM | OXYGEN SATURATION: 98 % | HEIGHT: 68 IN | DIASTOLIC BLOOD PRESSURE: 88 MMHG | WEIGHT: 195 LBS | SYSTOLIC BLOOD PRESSURE: 139 MMHG | TEMPERATURE: 97.7 F | BODY MASS INDEX: 29.55 KG/M2

## 2023-11-20 DIAGNOSIS — S05.51XA INTRAOCULAR FOREIGN BODY OF RIGHT EYE, INITIAL ENCOUNTER: Primary | ICD-10-CM

## 2023-11-20 PROCEDURE — 99212 OFFICE O/P EST SF 10 MIN: CPT

## 2023-11-20 PROCEDURE — G8427 DOCREV CUR MEDS BY ELIG CLIN: HCPCS

## 2023-11-20 PROCEDURE — 3075F SYST BP GE 130 - 139MM HG: CPT

## 2023-11-20 PROCEDURE — G8484 FLU IMMUNIZE NO ADMIN: HCPCS

## 2023-11-20 PROCEDURE — 1036F TOBACCO NON-USER: CPT

## 2023-11-20 PROCEDURE — 3017F COLORECTAL CA SCREEN DOC REV: CPT

## 2023-11-20 PROCEDURE — G8417 CALC BMI ABV UP PARAM F/U: HCPCS

## 2023-11-20 PROCEDURE — 3079F DIAST BP 80-89 MM HG: CPT

## 2024-01-08 ENCOUNTER — APPOINTMENT (OUTPATIENT)
Dept: CT IMAGING | Age: 64
End: 2024-01-08
Payer: COMMERCIAL

## 2024-01-08 ENCOUNTER — HOSPITAL ENCOUNTER (EMERGENCY)
Age: 64
Discharge: HOME | End: 2024-01-09
Payer: COMMERCIAL

## 2024-01-08 VITALS
DIASTOLIC BLOOD PRESSURE: 88 MMHG | RESPIRATION RATE: 18 BRPM | SYSTOLIC BLOOD PRESSURE: 156 MMHG | TEMPERATURE: 97.7 F | HEART RATE: 74 BPM | OXYGEN SATURATION: 94 %

## 2024-01-08 DIAGNOSIS — S16.1XXA ACUTE STRAIN OF NECK MUSCLE, INITIAL ENCOUNTER: ICD-10-CM

## 2024-01-08 DIAGNOSIS — S09.90XA CLOSED HEAD INJURY, INITIAL ENCOUNTER: Primary | ICD-10-CM

## 2024-01-08 PROCEDURE — 72125 CT NECK SPINE W/O DYE: CPT

## 2024-01-08 PROCEDURE — 99284 EMERGENCY DEPT VISIT MOD MDM: CPT

## 2024-01-08 PROCEDURE — 70450 CT HEAD/BRAIN W/O DYE: CPT

## 2024-01-08 PROCEDURE — 96372 THER/PROPH/DIAG INJ SC/IM: CPT

## 2024-01-08 RX ORDER — ORPHENADRINE CITRATE 100 MG/1
100 TABLET, EXTENDED RELEASE ORAL 2 TIMES DAILY
Qty: 20 TABLET | Refills: 0 | Status: SHIPPED | OUTPATIENT
Start: 2024-01-08 | End: 2024-01-18

## 2024-01-08 RX ORDER — NAPROXEN 500 MG/1
500 TABLET ORAL 2 TIMES DAILY PRN
Qty: 14 TABLET | Refills: 0 | Status: SHIPPED | OUTPATIENT
Start: 2024-01-08

## 2024-01-08 RX ORDER — ORPHENADRINE CITRATE 30 MG/ML
60 INJECTION INTRAMUSCULAR; INTRAVENOUS ONCE
Status: COMPLETED | OUTPATIENT
Start: 2024-01-09 | End: 2024-01-09

## 2024-01-08 RX ORDER — HYDROCODONE BITARTRATE AND ACETAMINOPHEN 5; 325 MG/1; MG/1
1 TABLET ORAL ONCE
Status: COMPLETED | OUTPATIENT
Start: 2024-01-09 | End: 2024-01-09

## 2024-01-09 PROCEDURE — 6370000000 HC RX 637 (ALT 250 FOR IP): Performed by: NURSE PRACTITIONER

## 2024-01-09 PROCEDURE — 6360000002 HC RX W HCPCS: Performed by: NURSE PRACTITIONER

## 2024-01-09 PROCEDURE — 96372 THER/PROPH/DIAG INJ SC/IM: CPT

## 2024-01-09 RX ADMIN — HYDROCODONE BITARTRATE AND ACETAMINOPHEN 1 TABLET: 5; 325 TABLET ORAL at 00:08

## 2024-01-09 RX ADMIN — ORPHENADRINE CITRATE 60 MG: 60 INJECTION INTRAMUSCULAR; INTRAVENOUS at 00:08

## 2024-01-09 NOTE — ED PROVIDER NOTES
regarding the diagnosis and prognosis.  Questions are answered at this time and they are agreeable with the plan.      --------------------------------- IMPRESSION AND DISPOSITION ---------------------------------    IMPRESSION  1. Closed head injury, initial encounter    2. Acute strain of neck muscle, initial encounter        DISPOSITION  Disposition: Discharge to home  Patient condition is stable      NOTE: This report was transcribed using voice recognition software. Every effort was made to ensure accuracy; however, inadvertent computerized transcription errors may be present

## 2024-01-09 NOTE — DISCHARGE INSTRUCTIONS
Take meds for symptom relief, follow-up with [facility] physician.  Also follow-up with your primary care doctor.

## 2024-01-15 ENCOUNTER — OFFICE VISIT (OUTPATIENT)
Dept: FAMILY MEDICINE CLINIC | Age: 64
End: 2024-01-15
Payer: COMMERCIAL

## 2024-01-15 VITALS
SYSTOLIC BLOOD PRESSURE: 118 MMHG | WEIGHT: 201 LBS | TEMPERATURE: 97.6 F | RESPIRATION RATE: 16 BRPM | HEIGHT: 68 IN | DIASTOLIC BLOOD PRESSURE: 72 MMHG | HEART RATE: 70 BPM | BODY MASS INDEX: 30.46 KG/M2 | OXYGEN SATURATION: 96 %

## 2024-01-15 DIAGNOSIS — N18.2 TYPE 2 DIABETES MELLITUS WITH STAGE 2 CHRONIC KIDNEY DISEASE, WITHOUT LONG-TERM CURRENT USE OF INSULIN (HCC): Primary | ICD-10-CM

## 2024-01-15 DIAGNOSIS — Z23 NEEDS FLU SHOT: ICD-10-CM

## 2024-01-15 DIAGNOSIS — J30.89 NON-SEASONAL ALLERGIC RHINITIS DUE TO OTHER ALLERGIC TRIGGER: ICD-10-CM

## 2024-01-15 DIAGNOSIS — I10 ESSENTIAL HYPERTENSION: ICD-10-CM

## 2024-01-15 DIAGNOSIS — E11.22 TYPE 2 DIABETES MELLITUS WITH STAGE 2 CHRONIC KIDNEY DISEASE, WITHOUT LONG-TERM CURRENT USE OF INSULIN (HCC): Primary | ICD-10-CM

## 2024-01-15 DIAGNOSIS — E78.2 MIXED HYPERLIPIDEMIA: ICD-10-CM

## 2024-01-15 LAB
CHP ED QC CHECK: ABNORMAL
CREATININE URINE POCT: 50
GLUCOSE BLD-MCNC: 164 MG/DL
HBA1C MFR BLD: 7 %
MICROALBUMIN/CREAT 24H UR: 10 MG/G{CREAT}
MICROALBUMIN/CREAT UR-RTO: ABNORMAL

## 2024-01-15 PROCEDURE — 99214 OFFICE O/P EST MOD 30 MIN: CPT | Performed by: NURSE PRACTITIONER

## 2024-01-15 PROCEDURE — 2022F DILAT RTA XM EVC RTNOPTHY: CPT | Performed by: NURSE PRACTITIONER

## 2024-01-15 PROCEDURE — 82962 GLUCOSE BLOOD TEST: CPT | Performed by: NURSE PRACTITIONER

## 2024-01-15 PROCEDURE — 1036F TOBACCO NON-USER: CPT | Performed by: NURSE PRACTITIONER

## 2024-01-15 PROCEDURE — 90471 IMMUNIZATION ADMIN: CPT | Performed by: NURSE PRACTITIONER

## 2024-01-15 PROCEDURE — G8427 DOCREV CUR MEDS BY ELIG CLIN: HCPCS | Performed by: NURSE PRACTITIONER

## 2024-01-15 PROCEDURE — 3074F SYST BP LT 130 MM HG: CPT | Performed by: NURSE PRACTITIONER

## 2024-01-15 PROCEDURE — 90694 VACC AIIV4 NO PRSRV 0.5ML IM: CPT | Performed by: NURSE PRACTITIONER

## 2024-01-15 PROCEDURE — 3051F HG A1C>EQUAL 7.0%<8.0%: CPT | Performed by: NURSE PRACTITIONER

## 2024-01-15 PROCEDURE — 3078F DIAST BP <80 MM HG: CPT | Performed by: NURSE PRACTITIONER

## 2024-01-15 PROCEDURE — G8417 CALC BMI ABV UP PARAM F/U: HCPCS | Performed by: NURSE PRACTITIONER

## 2024-01-15 PROCEDURE — 82044 UR ALBUMIN SEMIQUANTITATIVE: CPT | Performed by: NURSE PRACTITIONER

## 2024-01-15 PROCEDURE — 3017F COLORECTAL CA SCREEN DOC REV: CPT | Performed by: NURSE PRACTITIONER

## 2024-01-15 PROCEDURE — 83036 HEMOGLOBIN GLYCOSYLATED A1C: CPT | Performed by: NURSE PRACTITIONER

## 2024-01-15 PROCEDURE — G8484 FLU IMMUNIZE NO ADMIN: HCPCS | Performed by: NURSE PRACTITIONER

## 2024-01-15 RX ORDER — ATENOLOL 25 MG/1
TABLET ORAL
Qty: 90 TABLET | Refills: 1 | Status: SHIPPED | OUTPATIENT
Start: 2024-01-15

## 2024-01-15 RX ORDER — FLUTICASONE PROPIONATE 50 MCG
1 SPRAY, SUSPENSION (ML) NASAL DAILY
Qty: 3 EACH | Refills: 3 | Status: SHIPPED | OUTPATIENT
Start: 2024-01-15

## 2024-01-15 RX ORDER — METFORMIN HYDROCHLORIDE 500 MG/1
500 TABLET, EXTENDED RELEASE ORAL 2 TIMES DAILY WITH MEALS
Qty: 180 TABLET | Refills: 1 | Status: SHIPPED | OUTPATIENT
Start: 2024-01-15

## 2024-01-15 RX ORDER — LISINOPRIL 10 MG/1
10 TABLET ORAL DAILY
Qty: 90 TABLET | Refills: 1 | Status: SHIPPED | OUTPATIENT
Start: 2024-01-15

## 2024-01-15 ASSESSMENT — PATIENT HEALTH QUESTIONNAIRE - PHQ9
SUM OF ALL RESPONSES TO PHQ9 QUESTIONS 1 & 2: 0
SUM OF ALL RESPONSES TO PHQ QUESTIONS 1-9: 0
1. LITTLE INTEREST OR PLEASURE IN DOING THINGS: 0
SUM OF ALL RESPONSES TO PHQ QUESTIONS 1-9: 0
SUM OF ALL RESPONSES TO PHQ QUESTIONS 1-9: 0
2. FEELING DOWN, DEPRESSED OR HOPELESS: 0
SUM OF ALL RESPONSES TO PHQ QUESTIONS 1-9: 0

## 2024-01-15 ASSESSMENT — ENCOUNTER SYMPTOMS
FACIAL SWELLING: 0
SINUS PRESSURE: 0
ABDOMINAL PAIN: 0
COLOR CHANGE: 0
SHORTNESS OF BREATH: 0
CONSTIPATION: 0
VOICE CHANGE: 0
DIARRHEA: 0
VOMITING: 0
RHINORRHEA: 0
SINUS PAIN: 0
COUGH: 0
SORE THROAT: 0
TROUBLE SWALLOWING: 0
BACK PAIN: 0
WHEEZING: 0
NAUSEA: 0
CHEST TIGHTNESS: 0

## 2024-01-15 NOTE — ASSESSMENT & PLAN NOTE
Flu vaccine:   Wash your hands regularly, and keep your hands away from your face.  Cover your mouth when you cough or sneeze.  Rest, plenty of fluids, Tylenol for body aches, fever.  Stay home from school, work, and other public places until you are feeling better and your fever has been gone for at least 24 hours. The fever needs to have gone away on its own without the help of medicine.  Ask people living with you to talk to their doctors about preventing the flu. They may get antiviral medicine to keep from getting the flu from you.  To prevent the flu in the future, get a flu vaccine every fall. Encourage people living with you to get the vaccine.    Minor reactions soreness, redness, or swelling at the site the shot was given.  -hoarseness, sore, red or itchy eyes  -cough, fever, aching, headache, fatigue or itching can occur and can begin  Soon after the shot and last 1 or 2 days.  -Some people get severe pain in the shoulder and have difficulty moving  The arm where the shot was given, this happens rarely.    -Signs of severe reaction occur rarely can include: very high fever, or unusual behavior, hives, swelling of the face and throat, difficulty breathing, fast heartbeat, dizziness and weakness usually occur within a few minutes to a few hours after the vaccination if these occur CALL 911 and go to the nearest emergency room.

## 2024-01-15 NOTE — ASSESSMENT & PLAN NOTE
well controlled, no significant side effects from medication noted. Continue lisinopril 10 mg daily, atenolol 25 mg daily labs reviewed: lipid Dr Li decreased Atorvastatin 40 mg, ordered fasting labs CMP, CBCD, Lipids,    -Discussed taking medication as directed every day around the same time. Do not double up if skipped or missed doses.   -Discussed exercising daily 30 minutes 5 times a week for 150 minutes weekly to help with stress reduction and weight reduction if needed.  -Discussed low sodium diet.  -Discussed limiting caffeine consumption and tobacco cessation and the effects they have on the heart and blood pressure.

## 2024-01-15 NOTE — PROGRESS NOTES
unusual muscle aching/pain to contact the office, discussed medication and risk of muscle pain/damage from Rhabdomyolysis.  -Discussed repeat labs in end of the week  Orders:  -     CBC with Auto Differential; Future  -     Comprehensive Metabolic Panel; Future  -     Lipid Panel; Future  4. Needs flu shot  Assessment & Plan:   Flu vaccine:   Wash your hands regularly, and keep your hands away from your face.  Cover your mouth when you cough or sneeze.  Rest, plenty of fluids, Tylenol for body aches, fever.  Stay home from school, work, and other public places until you are feeling better and your fever has been gone for at least 24 hours. The fever needs to have gone away on its own without the help of medicine.  Ask people living with you to talk to their doctors about preventing the flu. They may get antiviral medicine to keep from getting the flu from you.  To prevent the flu in the future, get a flu vaccine every fall. Encourage people living with you to get the vaccine.    Minor reactions soreness, redness, or swelling at the site the shot was given.  -hoarseness, sore, red or itchy eyes  -cough, fever, aching, headache, fatigue or itching can occur and can begin  Soon after the shot and last 1 or 2 days.  -Some people get severe pain in the shoulder and have difficulty moving  The arm where the shot was given, this happens rarely.    -Signs of severe reaction occur rarely can include: very high fever, or unusual behavior, hives, swelling of the face and throat, difficulty breathing, fast heartbeat, dizziness and weakness usually occur within a few minutes to a few hours after the vaccination if these occur CALL 911 and go to the nearest emergency room.     Orders:  -     Influenza, FLUAD, (age 65 y+), IM, Preservative Free, 0.5 mL  5. Non-seasonal allergic rhinitis due to other allergic trigger  Assessment & Plan:    Stable at this time. Continue the Flonase as directed   Orders:  -     fluticasone (FLONASE)

## 2024-01-15 NOTE — ASSESSMENT & PLAN NOTE
borderline controlled and no significant medication side effects noted, Continue Atorvastatin 40 mg nightly check fasting labs with reduction in dose  labs reviewed Lipid,   -Discussed low fat diet, limit fast food, goodies, breads and pastas if consuming several days a week,  limit alcohol consumption as this combined with statin can cause liver problems.  -Discussed weight reduction and exercise 30 minutes 5 days a week for total of 150 minutes weekly.  -Discussed CoQ10 as directed  -Discussed if any unusual muscle aching/pain to contact the office, discussed medication and risk of muscle pain/damage from Rhabdomyolysis.  -Discussed repeat labs in end of the week

## 2024-01-15 NOTE — ASSESSMENT & PLAN NOTE
reasonably well controlled and no significant medication side effects noted A1c 7.0  %, increase Trulicity 3 mg weekly, jardiance 25 mg daily, metformin  mg BID., tasting labs ordered CMP, CBCD, lipids   BS log reviewed with patient   Monitor BS at different times: 1 day fasting, then next day 2 hours after lunch, next day 2 hours after dinner, next day at bedtime then start over and log all values.  Bring log to next appointment  Discussed hypoglycemia prevention and treatment, take medication regularly don't skip doses or double up.   Foot exam every day; wash and dry well between toes, look for any redness, cracks, wounds notify provider if any problems occur. Be sure to wear hard sole shoe at all times and not go barefooted. Wear proper fitting shoes to prevent ulcer, callous formation.   Reminder for Podiatry visits Q 2 Months for toenail care if needed  Reminder for annual Eye exam  Reminder to keep vaccines up dated  Exercise 30 minutes daily for 150 minutes a week  Recommend Diabetic Education Classes if you have not already attended  Stay up to date on all immunizations.

## 2024-01-19 DIAGNOSIS — E78.2 MIXED HYPERLIPIDEMIA: ICD-10-CM

## 2024-01-19 DIAGNOSIS — N18.2 TYPE 2 DIABETES MELLITUS WITH STAGE 2 CHRONIC KIDNEY DISEASE, WITHOUT LONG-TERM CURRENT USE OF INSULIN (HCC): ICD-10-CM

## 2024-01-19 DIAGNOSIS — E11.22 TYPE 2 DIABETES MELLITUS WITH STAGE 2 CHRONIC KIDNEY DISEASE, WITHOUT LONG-TERM CURRENT USE OF INSULIN (HCC): ICD-10-CM

## 2024-01-19 DIAGNOSIS — I10 ESSENTIAL HYPERTENSION: ICD-10-CM

## 2024-01-19 LAB
ABSOLUTE IMMATURE GRANULOCYTE: 0.04 K/UL (ref 0–0.58)
ALBUMIN SERPL-MCNC: 4.1 G/DL (ref 3.5–5.2)
ALP BLD-CCNC: 96 U/L (ref 40–129)
ALT SERPL-CCNC: 53 U/L (ref 0–40)
ANION GAP SERPL CALCULATED.3IONS-SCNC: 16 MMOL/L (ref 7–16)
AST SERPL-CCNC: 32 U/L (ref 0–39)
BASOPHILS ABSOLUTE: 0.11 K/UL (ref 0–0.2)
BASOPHILS RELATIVE PERCENT: 2 % (ref 0–2)
BILIRUB SERPL-MCNC: 0.5 MG/DL (ref 0–1.2)
BUN BLDV-MCNC: 11 MG/DL (ref 6–23)
CALCIUM SERPL-MCNC: 8.7 MG/DL (ref 8.6–10.2)
CHLORIDE BLD-SCNC: 106 MMOL/L (ref 98–107)
CHOLESTEROL: 121 MG/DL
CO2: 20 MMOL/L (ref 22–29)
CREAT SERPL-MCNC: 1.1 MG/DL (ref 0.7–1.2)
EOSINOPHILS ABSOLUTE: 0.4 K/UL (ref 0.05–0.5)
EOSINOPHILS RELATIVE PERCENT: 6 % (ref 0–6)
GFR SERPL CREATININE-BSD FRML MDRD: >60 ML/MIN/1.73M2
GLUCOSE BLD-MCNC: 175 MG/DL (ref 74–99)
HCT VFR BLD CALC: 42.8 % (ref 37–54)
HDLC SERPL-MCNC: 42 MG/DL
HEMOGLOBIN: 14.5 G/DL (ref 12.5–16.5)
IMMATURE GRANULOCYTES: 1 % (ref 0–5)
LDL CHOLESTEROL: 46 MG/DL
LYMPHOCYTES ABSOLUTE: 1.47 K/UL (ref 1.5–4)
LYMPHOCYTES RELATIVE PERCENT: 23 % (ref 20–42)
MCH RBC QN AUTO: 31.1 PG (ref 26–35)
MCHC RBC AUTO-ENTMCNC: 33.9 G/DL (ref 32–34.5)
MCV RBC AUTO: 91.8 FL (ref 80–99.9)
MONOCYTES ABSOLUTE: 0.51 K/UL (ref 0.1–0.95)
MONOCYTES RELATIVE PERCENT: 8 % (ref 2–12)
NEUTROPHILS ABSOLUTE: 3.94 K/UL (ref 1.8–7.3)
NEUTROPHILS RELATIVE PERCENT: 61 % (ref 43–80)
PDW BLD-RTO: 12.5 % (ref 11.5–15)
PLATELET # BLD: 188 K/UL (ref 130–450)
PMV BLD AUTO: 12.6 FL (ref 7–12)
POTASSIUM SERPL-SCNC: 4.2 MMOL/L (ref 3.5–5)
RBC # BLD: 4.66 M/UL (ref 3.8–5.8)
SODIUM BLD-SCNC: 142 MMOL/L (ref 132–146)
TOTAL PROTEIN: 6.6 G/DL (ref 6.4–8.3)
TRIGL SERPL-MCNC: 163 MG/DL
VLDLC SERPL CALC-MCNC: 33 MG/DL
WBC # BLD: 6.5 K/UL (ref 4.5–11.5)

## 2024-01-22 DIAGNOSIS — E78.2 MIXED HYPERLIPIDEMIA: ICD-10-CM

## 2024-01-22 RX ORDER — ATORVASTATIN CALCIUM 40 MG/1
40 TABLET, FILM COATED ORAL NIGHTLY
Qty: 90 TABLET | Refills: 1 | Status: SHIPPED | OUTPATIENT
Start: 2024-01-22

## 2024-04-24 ENCOUNTER — OFFICE VISIT (OUTPATIENT)
Dept: FAMILY MEDICINE CLINIC | Age: 64
End: 2024-04-24
Payer: COMMERCIAL

## 2024-04-24 VITALS
TEMPERATURE: 97.6 F | WEIGHT: 196 LBS | HEART RATE: 66 BPM | SYSTOLIC BLOOD PRESSURE: 124 MMHG | OXYGEN SATURATION: 98 % | RESPIRATION RATE: 16 BRPM | BODY MASS INDEX: 29.7 KG/M2 | DIASTOLIC BLOOD PRESSURE: 76 MMHG | HEIGHT: 68 IN

## 2024-04-24 DIAGNOSIS — E11.22 TYPE 2 DIABETES MELLITUS WITH STAGE 2 CHRONIC KIDNEY DISEASE, WITHOUT LONG-TERM CURRENT USE OF INSULIN (HCC): Primary | ICD-10-CM

## 2024-04-24 DIAGNOSIS — I20.89 STABLE ANGINA PECTORIS (HCC): ICD-10-CM

## 2024-04-24 DIAGNOSIS — I10 ESSENTIAL HYPERTENSION: ICD-10-CM

## 2024-04-24 DIAGNOSIS — E78.2 MIXED HYPERLIPIDEMIA: ICD-10-CM

## 2024-04-24 DIAGNOSIS — N18.2 TYPE 2 DIABETES MELLITUS WITH STAGE 2 CHRONIC KIDNEY DISEASE, WITHOUT LONG-TERM CURRENT USE OF INSULIN (HCC): Primary | ICD-10-CM

## 2024-04-24 DIAGNOSIS — Z12.5 SCREENING FOR PROSTATE CANCER: ICD-10-CM

## 2024-04-24 DIAGNOSIS — M18.12 DEGENERATIVE ARTHRITIS OF THUMB, LEFT: ICD-10-CM

## 2024-04-24 PROBLEM — H61.21 IMPACTED CERUMEN OF RIGHT EAR: Status: RESOLVED | Noted: 2021-12-17 | Resolved: 2024-04-24

## 2024-04-24 LAB
CHP ED QC CHECK: ABNORMAL
GLUCOSE BLD-MCNC: 160 MG/DL
HBA1C MFR BLD: 7.1 %

## 2024-04-24 PROCEDURE — 82962 GLUCOSE BLOOD TEST: CPT | Performed by: NURSE PRACTITIONER

## 2024-04-24 PROCEDURE — 3074F SYST BP LT 130 MM HG: CPT | Performed by: NURSE PRACTITIONER

## 2024-04-24 PROCEDURE — 3078F DIAST BP <80 MM HG: CPT | Performed by: NURSE PRACTITIONER

## 2024-04-24 PROCEDURE — 1036F TOBACCO NON-USER: CPT | Performed by: NURSE PRACTITIONER

## 2024-04-24 PROCEDURE — G8427 DOCREV CUR MEDS BY ELIG CLIN: HCPCS | Performed by: NURSE PRACTITIONER

## 2024-04-24 PROCEDURE — 3051F HG A1C>EQUAL 7.0%<8.0%: CPT | Performed by: NURSE PRACTITIONER

## 2024-04-24 PROCEDURE — 83036 HEMOGLOBIN GLYCOSYLATED A1C: CPT | Performed by: NURSE PRACTITIONER

## 2024-04-24 PROCEDURE — G8417 CALC BMI ABV UP PARAM F/U: HCPCS | Performed by: NURSE PRACTITIONER

## 2024-04-24 PROCEDURE — 3017F COLORECTAL CA SCREEN DOC REV: CPT | Performed by: NURSE PRACTITIONER

## 2024-04-24 PROCEDURE — 2022F DILAT RTA XM EVC RTNOPTHY: CPT | Performed by: NURSE PRACTITIONER

## 2024-04-24 PROCEDURE — 99214 OFFICE O/P EST MOD 30 MIN: CPT | Performed by: NURSE PRACTITIONER

## 2024-04-24 RX ORDER — METFORMIN HYDROCHLORIDE 500 MG/1
500 TABLET, EXTENDED RELEASE ORAL 2 TIMES DAILY WITH MEALS
Qty: 180 TABLET | Refills: 1 | Status: SHIPPED | OUTPATIENT
Start: 2024-04-24

## 2024-04-24 RX ORDER — ORPHENADRINE CITRATE 100 MG/1
100 TABLET, EXTENDED RELEASE ORAL 2 TIMES DAILY PRN
Qty: 20 TABLET | Refills: 0 | Status: SHIPPED | OUTPATIENT
Start: 2024-04-24

## 2024-04-24 RX ORDER — LISINOPRIL 10 MG/1
10 TABLET ORAL DAILY
Qty: 90 TABLET | Refills: 1 | Status: SHIPPED | OUTPATIENT
Start: 2024-04-24

## 2024-04-24 RX ORDER — ORPHENADRINE CITRATE 100 MG/1
100 TABLET, EXTENDED RELEASE ORAL 2 TIMES DAILY PRN
COMMUNITY
End: 2024-04-24 | Stop reason: SDUPTHER

## 2024-04-24 RX ORDER — ATORVASTATIN CALCIUM 40 MG/1
40 TABLET, FILM COATED ORAL NIGHTLY
Qty: 90 TABLET | Refills: 1 | Status: SHIPPED | OUTPATIENT
Start: 2024-04-24

## 2024-04-24 RX ORDER — NITROGLYCERIN 0.4 MG/1
TABLET SUBLINGUAL
Qty: 25 TABLET | Refills: 0 | Status: SHIPPED | OUTPATIENT
Start: 2024-04-24

## 2024-04-24 RX ORDER — ATENOLOL 25 MG/1
TABLET ORAL
Qty: 90 TABLET | Refills: 1 | Status: SHIPPED | OUTPATIENT
Start: 2024-04-24

## 2024-04-24 SDOH — ECONOMIC STABILITY: FOOD INSECURITY: WITHIN THE PAST 12 MONTHS, YOU WORRIED THAT YOUR FOOD WOULD RUN OUT BEFORE YOU GOT MONEY TO BUY MORE.: NEVER TRUE

## 2024-04-24 SDOH — ECONOMIC STABILITY: FOOD INSECURITY: WITHIN THE PAST 12 MONTHS, THE FOOD YOU BOUGHT JUST DIDN'T LAST AND YOU DIDN'T HAVE MONEY TO GET MORE.: NEVER TRUE

## 2024-04-24 SDOH — ECONOMIC STABILITY: INCOME INSECURITY: HOW HARD IS IT FOR YOU TO PAY FOR THE VERY BASICS LIKE FOOD, HOUSING, MEDICAL CARE, AND HEATING?: NOT HARD AT ALL

## 2024-04-24 ASSESSMENT — ENCOUNTER SYMPTOMS
DIARRHEA: 0
SHORTNESS OF BREATH: 0
WHEEZING: 0
CHEST TIGHTNESS: 0
SORE THROAT: 0
RHINORRHEA: 0
FACIAL SWELLING: 0
COLOR CHANGE: 0
TROUBLE SWALLOWING: 0
BACK PAIN: 0
ABDOMINAL PAIN: 0
SINUS PRESSURE: 0
COUGH: 0
VOMITING: 0
VOICE CHANGE: 0
NAUSEA: 0
CONSTIPATION: 0
SINUS PAIN: 0

## 2024-04-24 NOTE — ASSESSMENT & PLAN NOTE
reasonably well controlled and no significant medication side effects noted A1c 7.1  %, has increased even though increased Trulicity 3 mg weekly, jardiance 25 mg daily, metformin  mg BID., tasting labs ordered CMP, CBCD, lipids . Referral to DM education classes  BS log reviewed with patient   Monitor BS at different times: 1 day fasting, then next day 2 hours after lunch, next day 2 hours after dinner, next day at bedtime then start over and log all values.  Bring log to next appointment  Discussed hypoglycemia prevention and treatment, take medication regularly don't skip doses or double up.   Foot exam every day; wash and dry well between toes, look for any redness, cracks, wounds notify provider if any problems occur. Be sure to wear hard sole shoe at all times and not go barefooted. Wear proper fitting shoes to prevent ulcer, callous formation.   Reminder for Podiatry visits Q 2 Months for toenail care if needed  Reminder for annual Eye exam  Reminder to keep vaccines up dated  Exercise 30 minutes daily for 150 minutes a week  Recommend Diabetic Education Classes if you have not already attended  Stay up to date on all immunizations.

## 2024-04-24 NOTE — ASSESSMENT & PLAN NOTE
well controlled, no significant side effects from medication noted. Continue lisinopril 10 mg daily, atenolol 25 mg daily labs reviewed: lipid CMP, CBCD, in epic 1/2024 stable    -Discussed taking medication as directed every day around the same time. Do not double up if skipped or missed doses.   -Discussed exercising daily 30 minutes 5 times a week for 150 minutes weekly to help with stress reduction and weight reduction if needed.  -Discussed low sodium diet.  -Discussed limiting caffeine consumption and tobacco cessation and the effects they have on the heart and blood pressure.

## 2024-04-24 NOTE — PROGRESS NOTES
OFFICE PROGRESS NOTE  [facility]  [address]  [place] [address]  Dept: [phone]   Chief Complaint   Patient presents with    Diabetes    Hyperlipidemia    Hypertension    Health Maintenance     Diabetic eye done at [facility].        ASSESSMENT/PLAN   1. Type 2 diabetes mellitus with stage 2 chronic kidney disease, without long-term current use of insulin (Formerly [facility])  Assessment & Plan:  reasonably well controlled and no significant medication side effects noted A1c 7.1  %, has increased even though increased Trulicity 3 mg weekly, jardiance 25 mg daily, metformin  mg BID., tasting labs ordered CMP, CBCD, lipids . Referral to DM education classes  BS log reviewed with patient   Monitor BS at different times: 1 day fasting, then next day 2 hours after lunch, next day 2 hours after dinner, next day at bedtime then start over and log all values.  Bring log to next appointment  Discussed hypoglycemia prevention and treatment, take medication regularly don't skip doses or double up.   Foot exam every day; wash and dry well between toes, look for any redness, cracks, wounds notify provider if any problems occur. Be sure to wear hard sole shoe at all times and not go barefooted. Wear proper fitting shoes to prevent ulcer, callous formation.   Reminder for Podiatry visits Q 2 Months for toenail care if needed  Reminder for annual Eye exam  Reminder to keep vaccines up dated  Exercise 30 minutes daily for 150 minutes a week  Recommend Diabetic Education Classes if you have not already attended  Stay up to date on all immunizations.   Orders:  -     POCT glycosylated hemoglobin (Hb A1C)  -     POCT Glucose  -     Dulaglutide 3 MG/0.5ML SOPN; Inject 3 mg into the skin once a week Note increase in dose, Disp-4 Adjustable Dose Pre-filled Pen Syringe, R-4Normal  -     empagliflozin (JARDIANCE) 25 MG tablet; Take 1 tablet by mouth daily, Disp-30 tablet, R-6Will they

## 2024-04-24 NOTE — ASSESSMENT & PLAN NOTE
borderline controlled  triglycerides still slightly elevated increase the fish oil 1200 mg BID, no significant medication side effects noted, Continue Atorvastatin 40 mg nightly  labs reviewed Lipid, CMP, CBCD in epic 1/2024  -Discussed low fat diet, limit fast food, goodies, breads and pastas if consuming several days a week,  limit alcohol consumption as this combined with statin can cause liver problems.  -Discussed weight reduction and exercise 30 minutes 5 days a week for total of 150 minutes weekly.  -Discussed CoQ10 as directed  -Discussed if any unusual muscle aching/pain to contact the office, discussed medication and risk of muscle pain/damage from Rhabdomyolysis.

## 2024-05-24 ENCOUNTER — HOSPITAL ENCOUNTER (OUTPATIENT)
Dept: DIABETES SERVICES | Age: 64
Setting detail: THERAPIES SERIES
Discharge: HOME | End: 2024-05-24
Payer: COMMERCIAL

## 2024-05-24 PROCEDURE — G0108 DIAB MANAGE TRN  PER INDIV: HCPCS

## 2024-05-24 ASSESSMENT — PROBLEM AREAS IN DIABETES QUESTIONNAIRE (PAID)
FEELING SCARED WHEN YOU THINK ABOUT LIVING WITH DIABETES: SOMEWHAT SERIOUS PROBLEM
COPING WITH COMPLICATIONS OF DIABETES: NOT A PROBLEM
PAID-5 TOTAL SCORE: 5
WORRYING ABOUT THE FUTURE AND THE POSSIBILITY OF SERIOUS COMPLICATIONS: MODERATE PROBLEM
FEELING DEPRESSED WHEN YOU THINK ABOUT LIVING WITH DIABETES: NOT A PROBLEM
FEELING THAT DIABETES IS TAKING UP TOO MUCH OF YOUR MENTAL AND PHYSICAL ENERGY EVERY DAY: NOT A PROBLEM

## 2024-05-24 NOTE — PROGRESS NOTES
medications safely:   -State effects of diabetes medicines on blood glucose levels;  -List diabetes medication taken, action & side effects 1 [] All     []  [x]  3 5/24/24 se  Jardiance 25mg/day  Metformin XR 500mg 2times/day   Insulin/Injectables/glucagon  -Name appropriate injection sites; proper storage; supplies needed;     []       Demonstrate proper technique  []      Monitoring blood glucose, interpreting and using results:   -Identify the purpose of testing   -Identify recommended & personal blood glucose targets & HgbA1C target levels  -State the Importance of logging blood glucose levels for pattern recognition;   -State benefits of reading/using pt generated health data  -Verbalize safe lancet disposal/CGM 1 [] All     [x]  [x]    [x]  [x]  []  3 5/24/24 se  Only testing FBS  Ranges from 145-165 most days   -Demonstrate proper testing technique  []      Incorporating physical activity into lifestyle:   -State effect of exercise on blood glucose levels;   -State benefits of regular exercise;   -Define safety considerations/food choices if needed.  -Describe contraindications/maintenance of activity. 1 [x] All     []  []    []  []  3    Incorporating nutritional management into lifestyle:   -Describe effect of type, amount & timing of food on blood glucose  -Describe methods for preparing and planning   healthy meals  -Correctly read food labels for nutritional values  -Name 3 foods high in Carbohydrate  -Plan a sample 4 carbohydrate-controlled meal using Diabetes Plate Method  -Verbalized ability to measure and count carbohydrate gram servings using food labels and carbohydrate food list.    -Plan a carbohydrate-controlled meal based on individual needs/preferences from a Registered Dietitian.   1 [x] All       []    []    []  []      []        []  3 5/24/24 se  Reviewed nutritional recommendations for salt, added sugar, fats, and fiber. Reviewed plate method and portion control. Reviewed which foods

## 2024-08-02 DIAGNOSIS — E11.22 TYPE 2 DIABETES MELLITUS WITH STAGE 2 CHRONIC KIDNEY DISEASE, WITHOUT LONG-TERM CURRENT USE OF INSULIN (HCC): ICD-10-CM

## 2024-08-02 DIAGNOSIS — I10 ESSENTIAL HYPERTENSION: ICD-10-CM

## 2024-08-02 DIAGNOSIS — Z12.5 SCREENING FOR PROSTATE CANCER: ICD-10-CM

## 2024-08-02 DIAGNOSIS — N18.2 TYPE 2 DIABETES MELLITUS WITH STAGE 2 CHRONIC KIDNEY DISEASE, WITHOUT LONG-TERM CURRENT USE OF INSULIN (HCC): ICD-10-CM

## 2024-08-02 DIAGNOSIS — E78.2 MIXED HYPERLIPIDEMIA: ICD-10-CM

## 2024-08-02 LAB
ALBUMIN: 4.4 G/DL (ref 3.5–5.2)
ALP BLD-CCNC: 105 U/L (ref 40–129)
ALT SERPL-CCNC: 39 U/L (ref 0–40)
ANION GAP SERPL CALCULATED.3IONS-SCNC: 16 MMOL/L (ref 7–16)
AST SERPL-CCNC: 24 U/L (ref 0–39)
BILIRUB SERPL-MCNC: 0.6 MG/DL (ref 0–1.2)
BUN BLDV-MCNC: 17 MG/DL (ref 6–23)
CALCIUM SERPL-MCNC: 9 MG/DL (ref 8.6–10.2)
CHLORIDE BLD-SCNC: 105 MMOL/L (ref 98–107)
CHOLESTEROL, TOTAL: 142 MG/DL
CO2: 21 MMOL/L (ref 22–29)
CREAT SERPL-MCNC: 1 MG/DL (ref 0.7–1.2)
GFR, ESTIMATED: 81 ML/MIN/1.73M2
GLUCOSE BLD-MCNC: 151 MG/DL (ref 74–99)
HDLC SERPL-MCNC: 43 MG/DL
LDL CHOLESTEROL: 73 MG/DL
POTASSIUM SERPL-SCNC: 4.4 MMOL/L (ref 3.5–5)
PROSTATE SPECIFIC ANTIGEN: 0.28 NG/ML (ref 0–4)
SODIUM BLD-SCNC: 142 MMOL/L (ref 132–146)
TOTAL PROTEIN: 7.2 G/DL (ref 6.4–8.3)
TRIGL SERPL-MCNC: 128 MG/DL
VLDLC SERPL CALC-MCNC: 26 MG/DL

## 2024-08-13 RX ORDER — GLUCOSAM/CHON-MSM1/C/MANG/BOSW 500-416.6
TABLET ORAL
Refills: 0 | OUTPATIENT
Start: 2024-08-13

## 2024-08-13 RX ORDER — CALCIUM CITRATE/VITAMIN D3 200MG-6.25
TABLET ORAL
Refills: 0 | OUTPATIENT
Start: 2024-08-13

## 2024-08-22 ENCOUNTER — OFFICE VISIT (OUTPATIENT)
Dept: FAMILY MEDICINE CLINIC | Age: 64
End: 2024-08-22
Payer: COMMERCIAL

## 2024-08-22 VITALS
TEMPERATURE: 97.6 F | DIASTOLIC BLOOD PRESSURE: 70 MMHG | SYSTOLIC BLOOD PRESSURE: 140 MMHG | HEART RATE: 52 BPM | BODY MASS INDEX: 28.34 KG/M2 | RESPIRATION RATE: 16 BRPM | HEIGHT: 68 IN | WEIGHT: 187 LBS | OXYGEN SATURATION: 100 %

## 2024-08-22 DIAGNOSIS — I10 ESSENTIAL HYPERTENSION: ICD-10-CM

## 2024-08-22 DIAGNOSIS — N18.2 TYPE 2 DIABETES MELLITUS WITH STAGE 2 CHRONIC KIDNEY DISEASE, WITHOUT LONG-TERM CURRENT USE OF INSULIN (HCC): Primary | ICD-10-CM

## 2024-08-22 DIAGNOSIS — Z12.11 ENCOUNTER FOR SCREENING COLONOSCOPY: ICD-10-CM

## 2024-08-22 DIAGNOSIS — M25.561 ACUTE PAIN OF BOTH KNEES: ICD-10-CM

## 2024-08-22 DIAGNOSIS — E11.22 TYPE 2 DIABETES MELLITUS WITH STAGE 2 CHRONIC KIDNEY DISEASE, WITHOUT LONG-TERM CURRENT USE OF INSULIN (HCC): Primary | ICD-10-CM

## 2024-08-22 DIAGNOSIS — I20.9 ANGINAL PAIN (HCC): ICD-10-CM

## 2024-08-22 DIAGNOSIS — I25.10 CAD, MULTIPLE VESSEL: ICD-10-CM

## 2024-08-22 DIAGNOSIS — M25.562 ACUTE PAIN OF BOTH KNEES: ICD-10-CM

## 2024-08-22 DIAGNOSIS — E78.2 MIXED HYPERLIPIDEMIA: ICD-10-CM

## 2024-08-22 PROBLEM — R74.01 ELEVATED ALT MEASUREMENT: Status: RESOLVED | Noted: 2022-10-03 | Resolved: 2024-08-22

## 2024-08-22 LAB
CHP ED QC CHECK: ABNORMAL
GLUCOSE BLD-MCNC: 203 MG/DL
HBA1C MFR BLD: 6.6 %

## 2024-08-22 PROCEDURE — G8417 CALC BMI ABV UP PARAM F/U: HCPCS | Performed by: NURSE PRACTITIONER

## 2024-08-22 PROCEDURE — 3077F SYST BP >= 140 MM HG: CPT | Performed by: NURSE PRACTITIONER

## 2024-08-22 PROCEDURE — 82962 GLUCOSE BLOOD TEST: CPT | Performed by: NURSE PRACTITIONER

## 2024-08-22 PROCEDURE — 3044F HG A1C LEVEL LT 7.0%: CPT | Performed by: NURSE PRACTITIONER

## 2024-08-22 PROCEDURE — 1036F TOBACCO NON-USER: CPT | Performed by: NURSE PRACTITIONER

## 2024-08-22 PROCEDURE — 99214 OFFICE O/P EST MOD 30 MIN: CPT | Performed by: NURSE PRACTITIONER

## 2024-08-22 PROCEDURE — 3017F COLORECTAL CA SCREEN DOC REV: CPT | Performed by: NURSE PRACTITIONER

## 2024-08-22 PROCEDURE — 3078F DIAST BP <80 MM HG: CPT | Performed by: NURSE PRACTITIONER

## 2024-08-22 PROCEDURE — 83036 HEMOGLOBIN GLYCOSYLATED A1C: CPT | Performed by: NURSE PRACTITIONER

## 2024-08-22 PROCEDURE — 2022F DILAT RTA XM EVC RTNOPTHY: CPT | Performed by: NURSE PRACTITIONER

## 2024-08-22 PROCEDURE — G8427 DOCREV CUR MEDS BY ELIG CLIN: HCPCS | Performed by: NURSE PRACTITIONER

## 2024-08-22 RX ORDER — LISINOPRIL 10 MG/1
10 TABLET ORAL DAILY
Qty: 90 TABLET | Refills: 1 | Status: SHIPPED | OUTPATIENT
Start: 2024-08-22

## 2024-08-22 RX ORDER — LANCETS 30 GAUGE
1 EACH MISCELLANEOUS DAILY
Qty: 100 EACH | Refills: 5 | Status: SHIPPED | OUTPATIENT
Start: 2024-08-22

## 2024-08-22 RX ORDER — METFORMIN HYDROCHLORIDE 500 MG/1
500 TABLET, EXTENDED RELEASE ORAL 2 TIMES DAILY WITH MEALS
Qty: 180 TABLET | Refills: 1 | Status: SHIPPED | OUTPATIENT
Start: 2024-08-22

## 2024-08-22 RX ORDER — GLUCOSAMINE HCL/CHONDROITIN SU 500-400 MG
CAPSULE ORAL
Qty: 100 STRIP | Refills: 3 | Status: SHIPPED | OUTPATIENT
Start: 2024-08-22

## 2024-08-22 ASSESSMENT — ENCOUNTER SYMPTOMS
SHORTNESS OF BREATH: 0
SINUS PAIN: 0
NAUSEA: 0
CHEST TIGHTNESS: 0
SINUS PRESSURE: 0
FACIAL SWELLING: 0
COLOR CHANGE: 0
RHINORRHEA: 0
TROUBLE SWALLOWING: 0
COUGH: 0
SORE THROAT: 0
CONSTIPATION: 0
VOMITING: 0
ABDOMINAL PAIN: 0
VOICE CHANGE: 0
BACK PAIN: 0
DIARRHEA: 0
WHEEZING: 0

## 2024-08-22 NOTE — ASSESSMENT & PLAN NOTE
Hx of CAD and multivessel about a month ago did have some pain lasted few minutes no nitro second occurrence he checked his watch and was sinus arrhythmia, needs new cardiologist as Dr Li no longer takes his insurance. Referral to Cardiology placed

## 2024-08-22 NOTE — PROGRESS NOTES
minutes no nitro second occurrence he checked his watch and was sinus arrhythmia, needs new cardiologist as Dr Li no longer takes his insurance. Referral to Cardiology placed  Orders:  -     Ambulatory referral to Cardiology  5. Anginal pain (HCC)  Assessment & Plan:  Hx of CAD and multivessel about a month ago did have some pain lasted few minutes no nitro second occurrence he checked his watch and was sinus arrhythmia, needs new cardiologist as Dr Li no longer takes his insurance. Referral to Cardiology placed    Orders:  -     Ambulatory referral to Cardiology  6. Encounter for screening colonoscopy  Assessment & Plan:  SDM requires that the patient: Patient involved as active partner with the clinician in clarifying acceptable options and choosing a preferred one discussed FIT, Cologuard and Colonoscopy. If choosing FIT or Cologuard and if positive results patient understands the need to proceed with colonoscopy. Recommendations are individualized based on patients' special needs and preferences.    Understands the risk/seriousness of the disease or condition. Understands the preventive services, including risks, benefits, alternatives. Weighs their values regarding the potential harms and benefits of each option. Engages in decision-making at a level they desire and feel comfortable with. Referral Dr Arndt    Orders:  -     Amb External Referral To Gastroenterology  7. Acute pain of both knees  Assessment & Plan:   Off and on so at this time watch and see how it does exam unremarkable.        Reviewed labs: CMP,  Lipids, PSA, CBCD  Lab review:   Reviewed notes from   Reviewed radiology      weight loss,  exercising 30 minutes daily, and taking medications as directed and adverse effects    Return in about 4 months (around 12/22/2024) for [facility] care new provider DR Reddy.     HPI:   SUBJECTIVE:     64 y.o. male presents today for follow-up of diabetes [place].  In-office bloodsugar is:

## 2024-08-22 NOTE — ASSESSMENT & PLAN NOTE
borderline controlled LDL not at goal but can't tolerate the atorvastatin 80 reduced by cardiologist triglycerides improved continue fish oil 1200 mg BID, no significant medication side effects noted, Continue Atorvastatin 40 mg nightly  labs reviewed Lipid, CMP, CBCD in epic 8/2024  -Discussed low fat diet, limit fast food, goodies, breads and pastas if consuming several days a week,  limit alcohol consumption as this combined with statin can cause liver problems.  -Discussed weight reduction and exercise 30 minutes 5 days a week for total of 150 minutes weekly.  -Discussed CoQ10 as directed  -Discussed if any unusual muscle aching/pain to contact the office, discussed medication and risk of muscle pain/damage from Rhabdomyolysis.

## 2024-08-22 NOTE — ASSESSMENT & PLAN NOTE
borderline controlled today did have high sodium dinner last night, no significant side effects from medication noted. Continue lisinopril 10 mg daily, atenolol 25 mg daily labs reviewed: lipid CMP, CBCD, in epic 8/2024 stable    -Discussed taking medication as directed every day around the same time. Do not double up if skipped or missed doses.   -Discussed exercising daily 30 minutes 5 times a week for 150 minutes weekly to help with stress reduction and weight reduction if needed.  -Discussed low sodium diet.  -Discussed limiting caffeine consumption and tobacco cessation and the effects they have on the heart and blood pressure.

## 2024-08-22 NOTE — ASSESSMENT & PLAN NOTE
well controlled and no significant medication side effects noted A1c 6.6  %, discontinue Trulicity 3 mg weekly due to availability has done well after DM class continue jardiance 25 mg daily, metformin  mg BID., labs reviewed CMP, CBCD, lipids, PSA stable .   BS log reviewed with patient   Monitor BS at different times: 1 day fasting, then next day 2 hours after lunch, next day 2 hours after dinner, next day at bedtime then start over and log all values.  Bring log to next appointment  Discussed hypoglycemia prevention and treatment, take medication regularly don't skip doses or double up.   Foot exam every day; wash and dry well between toes, look for any redness, cracks, wounds notify provider if any problems occur. Be sure to wear hard sole shoe at all times and not go barefooted. Wear proper fitting shoes to prevent ulcer, callous formation.   Reminder for Podiatry visits Q 2 Months for toenail care if needed  Reminder for annual Eye exam  Reminder to keep vaccines up dated  Exercise 30 minutes daily for 150 minutes a week  Recommend Diabetic Education Classes if you have not already attended  Stay up to date on all immunizations.

## 2024-08-22 NOTE — ASSESSMENT & PLAN NOTE
SDM requires that the patient: Patient involved as active partner with the clinician in clarifying acceptable options and choosing a preferred one discussed FIT, Cologuard and Colonoscopy. If choosing FIT or Cologuard and if positive results patient understands the need to proceed with colonoscopy. Recommendations are individualized based on patients' special needs and preferences.    Understands the risk/seriousness of the disease or condition. Understands the preventive services, including risks, benefits, alternatives. Weighs their values regarding the potential harms and benefits of each option. Engages in decision-making at a level they desire and feel comfortable with. Referral Dr Arndt

## 2024-08-29 DIAGNOSIS — N18.2 TYPE 2 DIABETES MELLITUS WITH STAGE 2 CHRONIC KIDNEY DISEASE, WITHOUT LONG-TERM CURRENT USE OF INSULIN (HCC): ICD-10-CM

## 2024-08-29 DIAGNOSIS — E11.22 TYPE 2 DIABETES MELLITUS WITH STAGE 2 CHRONIC KIDNEY DISEASE, WITHOUT LONG-TERM CURRENT USE OF INSULIN (HCC): ICD-10-CM

## 2024-08-29 RX ORDER — GLUCOSAM/CHON-MSM1/C/MANG/BOSW 500-416.6
TABLET ORAL
Refills: 0 | OUTPATIENT
Start: 2024-08-29

## 2024-09-21 PROBLEM — Z12.11 ENCOUNTER FOR SCREENING COLONOSCOPY: Status: RESOLVED | Noted: 2024-08-22 | Resolved: 2024-09-21

## 2024-11-29 ENCOUNTER — OFFICE VISIT (OUTPATIENT)
Dept: CARDIOLOGY CLINIC | Age: 64
End: 2024-11-29
Payer: COMMERCIAL

## 2024-11-29 VITALS
RESPIRATION RATE: 16 BRPM | WEIGHT: 186 LBS | HEIGHT: 68 IN | BODY MASS INDEX: 28.19 KG/M2 | DIASTOLIC BLOOD PRESSURE: 82 MMHG | HEART RATE: 54 BPM | SYSTOLIC BLOOD PRESSURE: 136 MMHG

## 2024-11-29 DIAGNOSIS — I20.89 STABLE ANGINA PECTORIS (HCC): Primary | ICD-10-CM

## 2024-11-29 PROCEDURE — 1036F TOBACCO NON-USER: CPT | Performed by: INTERNAL MEDICINE

## 2024-11-29 PROCEDURE — 3075F SYST BP GE 130 - 139MM HG: CPT | Performed by: INTERNAL MEDICINE

## 2024-11-29 PROCEDURE — G8427 DOCREV CUR MEDS BY ELIG CLIN: HCPCS | Performed by: INTERNAL MEDICINE

## 2024-11-29 PROCEDURE — G8484 FLU IMMUNIZE NO ADMIN: HCPCS | Performed by: INTERNAL MEDICINE

## 2024-11-29 PROCEDURE — 93000 ELECTROCARDIOGRAM COMPLETE: CPT | Performed by: INTERNAL MEDICINE

## 2024-11-29 PROCEDURE — G8417 CALC BMI ABV UP PARAM F/U: HCPCS | Performed by: INTERNAL MEDICINE

## 2024-11-29 PROCEDURE — 3079F DIAST BP 80-89 MM HG: CPT | Performed by: INTERNAL MEDICINE

## 2024-11-29 PROCEDURE — 99202 OFFICE O/P NEW SF 15 MIN: CPT | Performed by: INTERNAL MEDICINE

## 2024-11-29 PROCEDURE — 3017F COLORECTAL CA SCREEN DOC REV: CPT | Performed by: INTERNAL MEDICINE

## 2024-11-29 ASSESSMENT — ENCOUNTER SYMPTOMS
SHORTNESS OF BREATH: 0
ABDOMINAL PAIN: 0
BACK PAIN: 0
NAUSEA: 0
COUGH: 0
WHEEZING: 0
CONSTIPATION: 0
VOMITING: 0
BLOOD IN STOOL: 0
DIARRHEA: 0

## 2024-11-29 NOTE — PROGRESS NOTES
Value Date    CHOL 142 08/02/2024    CHOL 121 01/19/2024    CHOL 135 08/04/2023     Lab Results   Component Value Date    TRIG 128 08/02/2024    TRIG 163 (H) 01/19/2024    TRIG 208 (H) 08/04/2023     Lab Results   Component Value Date    HDL 43 08/02/2024    HDL 42 01/19/2024    HDL 46 08/04/2023     No components found for: \"LDLCALC\", \"LDLCHOLESTEROL\"  Lab Results   Component Value Date    VLDL 26 08/02/2024    VLDL 33 01/19/2024    VLDL 42 08/04/2023           ASSESSMENT / PLAN:  -CAD: Patient has mild CAD by cardiac cath done last year.  Stable with no angina.  -Hypertension: High normal probably due to whitecoat hypertension.  -Hyperlipidemia: Controlled on moderate dose atorvastatin.  -Diabetes.  -History of fatty liver.  -Cervical disc disease.  -History of benign positional vertigo.  -History of COVID-19 infection.  -Obesity.    I will continue patient on his current cardiac medications  Patient was advised to lose weight  Patient was advised to remain active  Patient will follow-up my office in 1 year      Thank you for allowing me to participate in your patient's care. Please feel free to contact me if you have any questions or concerns.    CORNEL BALL MD FACC, [facility]

## 2025-02-14 ENCOUNTER — OFFICE VISIT (OUTPATIENT)
Dept: FAMILY MEDICINE CLINIC | Age: 65
End: 2025-02-14

## 2025-02-14 VITALS
OXYGEN SATURATION: 100 % | SYSTOLIC BLOOD PRESSURE: 139 MMHG | RESPIRATION RATE: 16 BRPM | HEIGHT: 68 IN | TEMPERATURE: 97.9 F | HEART RATE: 48 BPM | BODY MASS INDEX: 29.28 KG/M2 | WEIGHT: 193.2 LBS | DIASTOLIC BLOOD PRESSURE: 80 MMHG

## 2025-02-14 DIAGNOSIS — I25.10 CAD, MULTIPLE VESSEL: ICD-10-CM

## 2025-02-14 DIAGNOSIS — I20.89 STABLE ANGINA PECTORIS (HCC): ICD-10-CM

## 2025-02-14 DIAGNOSIS — N13.8 BPH WITH OBSTRUCTION/LOWER URINARY TRACT SYMPTOMS: ICD-10-CM

## 2025-02-14 DIAGNOSIS — M54.16 LUMBAR RADICULOPATHY: ICD-10-CM

## 2025-02-14 DIAGNOSIS — K76.0 NAFLD (NONALCOHOLIC FATTY LIVER DISEASE): ICD-10-CM

## 2025-02-14 DIAGNOSIS — E11.22 TYPE 2 DIABETES MELLITUS WITH STAGE 2 CHRONIC KIDNEY DISEASE, WITHOUT LONG-TERM CURRENT USE OF INSULIN (HCC): Primary | ICD-10-CM

## 2025-02-14 DIAGNOSIS — I10 ESSENTIAL HYPERTENSION: ICD-10-CM

## 2025-02-14 DIAGNOSIS — N18.2 TYPE 2 DIABETES MELLITUS WITH STAGE 2 CHRONIC KIDNEY DISEASE, WITHOUT LONG-TERM CURRENT USE OF INSULIN (HCC): Primary | ICD-10-CM

## 2025-02-14 DIAGNOSIS — N40.1 BPH WITH OBSTRUCTION/LOWER URINARY TRACT SYMPTOMS: ICD-10-CM

## 2025-02-14 PROBLEM — M25.552 PAIN IN LEFT HIP: Status: RESOLVED | Noted: 2023-08-11 | Resolved: 2025-02-14

## 2025-02-14 PROBLEM — M25.561 ACUTE PAIN OF BOTH KNEES: Status: RESOLVED | Noted: 2024-08-22 | Resolved: 2025-02-14

## 2025-02-14 PROBLEM — Z23 NEEDS FLU SHOT: Status: RESOLVED | Noted: 2021-09-27 | Resolved: 2025-02-14

## 2025-02-14 PROBLEM — R22.30 MASS OF SKIN OF SHOULDER: Status: RESOLVED | Noted: 2017-05-01 | Resolved: 2025-02-14

## 2025-02-14 PROBLEM — R39.12 BENIGN PROSTATIC HYPERPLASIA WITH WEAK URINARY STREAM: Status: RESOLVED | Noted: 2017-05-01 | Resolved: 2025-02-14

## 2025-02-14 PROBLEM — M25.562 ACUTE PAIN OF BOTH KNEES: Status: RESOLVED | Noted: 2024-08-22 | Resolved: 2025-02-14

## 2025-02-14 PROBLEM — M79.661 PAIN OF RIGHT KNEE AND LOWER LEG: Status: RESOLVED | Noted: 2017-05-01 | Resolved: 2025-02-14

## 2025-02-14 PROBLEM — M25.561 PAIN OF RIGHT KNEE AND LOWER LEG: Status: RESOLVED | Noted: 2017-05-01 | Resolved: 2025-02-14

## 2025-02-14 PROBLEM — I20.9 ANGINAL PAIN (HCC): Status: RESOLVED | Noted: 2024-08-22 | Resolved: 2025-02-14

## 2025-02-14 PROBLEM — M99.09 SOMATIC DYSFUNCTION OF BACK: Status: RESOLVED | Noted: 2018-07-20 | Resolved: 2025-02-14

## 2025-02-14 LAB — HBA1C MFR BLD: 7.5 %

## 2025-02-14 RX ORDER — METFORMIN HYDROCHLORIDE 500 MG/1
1000 TABLET, EXTENDED RELEASE ORAL 2 TIMES DAILY WITH MEALS
Qty: 180 TABLET | Refills: 5 | Status: SHIPPED | OUTPATIENT
Start: 2025-02-14

## 2025-02-14 RX ORDER — NITROGLYCERIN 0.4 MG/1
TABLET SUBLINGUAL
Qty: 25 TABLET | Refills: 0 | Status: SHIPPED | OUTPATIENT
Start: 2025-02-14

## 2025-02-14 SDOH — ECONOMIC STABILITY: FOOD INSECURITY: WITHIN THE PAST 12 MONTHS, YOU WORRIED THAT YOUR FOOD WOULD RUN OUT BEFORE YOU GOT MONEY TO BUY MORE.: NEVER TRUE

## 2025-02-14 SDOH — ECONOMIC STABILITY: FOOD INSECURITY: WITHIN THE PAST 12 MONTHS, THE FOOD YOU BOUGHT JUST DIDN'T LAST AND YOU DIDN'T HAVE MONEY TO GET MORE.: NEVER TRUE

## 2025-02-14 ASSESSMENT — PATIENT HEALTH QUESTIONNAIRE - PHQ9
SUM OF ALL RESPONSES TO PHQ QUESTIONS 1-9: 0
SUM OF ALL RESPONSES TO PHQ9 QUESTIONS 1 & 2: 0
SUM OF ALL RESPONSES TO PHQ QUESTIONS 1-9: 0
1. LITTLE INTEREST OR PLEASURE IN DOING THINGS: NOT AT ALL
2. FEELING DOWN, DEPRESSED OR HOPELESS: NOT AT ALL

## 2025-02-14 NOTE — PROGRESS NOTES
[facility]  [address], [place] [address]  Phone: [phone]        Patient:  Rohan Nunez 64 y.o. male          Chief complaint:   Chief Complaint   Patient presents with    New Patient     Previous PCP Kelle Tovar        Assessment and Plan     Rohan was seen today for new patient.    Diagnoses and all orders for this visit:    Type 2 diabetes mellitus with stage 2 chronic kidney disease, without long-term current use of insulin (HCC)  Chronic and not well-controlled  Increase metformin to 2 tablets twice daily  Continue Jardiance  The patient is asked to make an attempt to improve diet and exercise patterns to aid in medical management of this problem.  -     POCT glycosylated hemoglobin (Hb A1C)  -     metFORMIN (GLUCOPHAGE-XR) 500 MG extended release tablet; Take 2 tablets by mouth 2 times daily (with meals)    Stable angina pectoris (HCC)  CAD, multiple vessel  Chronic and stable  Continue following with cardiology  Continue current medications  -     nitroGLYCERIN (NITROSTAT) 0.4 MG SL tablet; DISSOLVE 1 TABLET UNDER THE TONGUE AS NEEDED  Monitor home heart rate, to call if symptomatic or heart rate continues to go lower    Essential hypertension  Chronic and stable  Continue current medications    Lumbar radiculopathy  Chronic and stable    NAFLD (nonalcoholic fatty liver disease)  Chronic and stable  Plan for repeat labs in the future    BPH with obstruction/lower urinary tract symptoms  Chronic and stable  PSA within normal limits    Other orders  -     Influenza, FLUCELVAX Trivalent, (age 6 mo+) IM, Preservative Free, 0.5mL        Please see Patient Instructions for further counseling and information given.        Advised to please be adherent to the treatment plans discussed today, and please call with any questions or concerns, letting the office know of any reasons that the plans may not be followed.  The risks of untreated conditions include worsening illness,

## 2025-02-14 NOTE — PATIENT INSTRUCTIONS
fiber. Try to limit soda pop, fruit juice, and energy drinks. Drink water instead.  Some fats can be part of a healthy diet. But cutting back on fats from highly processed foods like fast foods and many snack foods is a good way to lower the calories in your diet. Also, use smaller amounts of fats like butter, margarine, salad dressing, and mayonnaise. Add fresh garlic, lemon, or herbs to your meals to add flavor without adding fat.  Meats and dairy products can be a big source of hidden fats. Try to choose lean or low-fat versions of these products.  Fat-free cookies, candies, chips, and frozen treats can still be high in sugar and calories. Some fat-free foods have more calories than regular ones. Eat fat-free treats in moderation, as you would other foods.  If your favorite foods are high in fat, salt, sugar, or calories, limit how often you eat them. Eat smaller servings, or look for healthy substitutes. Fill up on fruits, vegetables, and whole grains.  Eating at home  Use meat as a side dish instead of as the main part of your meal.  Try main dishes that use whole wheat pasta, brown rice, dried beans, or vegetables.  Find ways to cook with little or no fat, such as broiling, steaming, or grilling.  Use cooking spray instead of oil. If you use oil, use a monounsaturated oil, such as canola or olive oil.  Trim fat from meats before you cook them.  Drain off fat after you brown the meat or while you roast it.  Chill soups and stews after you cook them. Then skim the fat off the top after it hardens.  Eating out  Order foods that are broiled or poached rather than fried or breaded.  Cut back on the amount of butter or margarine that you use on bread.  Order sauces, gravies, and salad dressings on the side, and use only a little.  When you order pasta, choose tomato sauce rather than cream sauce.  Ask for salsa with your baked potato instead of sour cream, butter, cheese, or cook.  Order meals in a small size

## 2025-03-10 DIAGNOSIS — N18.2 TYPE 2 DIABETES MELLITUS WITH STAGE 2 CHRONIC KIDNEY DISEASE, WITHOUT LONG-TERM CURRENT USE OF INSULIN (HCC): ICD-10-CM

## 2025-03-10 DIAGNOSIS — E11.22 TYPE 2 DIABETES MELLITUS WITH STAGE 2 CHRONIC KIDNEY DISEASE, WITHOUT LONG-TERM CURRENT USE OF INSULIN (HCC): ICD-10-CM

## 2025-03-10 DIAGNOSIS — E78.2 MIXED HYPERLIPIDEMIA: ICD-10-CM

## 2025-03-10 DIAGNOSIS — I10 ESSENTIAL HYPERTENSION: ICD-10-CM

## 2025-03-10 DIAGNOSIS — J30.89 NON-SEASONAL ALLERGIC RHINITIS DUE TO OTHER ALLERGIC TRIGGER: ICD-10-CM

## 2025-03-10 NOTE — TELEPHONE ENCOUNTER
Patient called for refills.    Name of Medication(s) Requested:  Requested Prescriptions     Pending Prescriptions Disp Refills    atorvastatin (LIPITOR) 40 MG tablet 90 tablet 1     Sig: Take 1 tablet by mouth at bedtime    lisinopril (PRINIVIL;ZESTRIL) 10 MG tablet 90 tablet 1     Sig: Take 1 tablet by mouth daily    atenolol (TENORMIN) 25 MG tablet 90 tablet 1     Sig: TAKE ONE TABLET BY MOUTH ONCE A DAY.    fluticasone (FLONASE) 50 MCG/ACT nasal spray 3 each 3     Si spray by Nasal route daily    empagliflozin (JARDIANCE) 25 MG tablet 30 tablet 5     Sig: Take 1 tablet by mouth daily       Medication is on current medication list Yes    Dosage and directions were verified? Yes    Quantity verified:  30 and 90 day supply     Pharmacy Verified?  Yes    Last Appointment:  2025    Future appts:  Future Appointments   Date Time Provider Department Center   2025  7:20 AM Edilia Reddy MD [facility]        (If no appt send self scheduling link. .REFILLAPPT)  Scheduling request sent?     [] Yes  [x] No    Does patient need updated?  [] Yes  [x] No

## 2025-03-13 RX ORDER — FLUTICASONE PROPIONATE 50 MCG
1 SPRAY, SUSPENSION (ML) NASAL DAILY
Qty: 3 EACH | Refills: 3 | Status: SHIPPED | OUTPATIENT
Start: 2025-03-13

## 2025-03-13 RX ORDER — ATORVASTATIN CALCIUM 40 MG/1
40 TABLET, FILM COATED ORAL NIGHTLY
Qty: 90 TABLET | Refills: 1 | Status: SHIPPED | OUTPATIENT
Start: 2025-03-13

## 2025-03-13 RX ORDER — ATENOLOL 25 MG/1
TABLET ORAL
Qty: 90 TABLET | Refills: 1 | Status: SHIPPED | OUTPATIENT
Start: 2025-03-13

## 2025-03-13 RX ORDER — LISINOPRIL 10 MG/1
10 TABLET ORAL DAILY
Qty: 90 TABLET | Refills: 1 | Status: SHIPPED | OUTPATIENT
Start: 2025-03-13

## 2025-03-16 ENCOUNTER — HOSPITAL ENCOUNTER (EMERGENCY)
Age: 65
Discharge: HOME | End: 2025-03-16
Attending: EMERGENCY MEDICINE
Payer: COMMERCIAL

## 2025-03-16 ENCOUNTER — APPOINTMENT (OUTPATIENT)
Dept: CT IMAGING | Age: 65
End: 2025-03-16
Payer: COMMERCIAL

## 2025-03-16 ENCOUNTER — APPOINTMENT (OUTPATIENT)
Dept: GENERAL RADIOLOGY | Age: 65
End: 2025-03-16
Payer: COMMERCIAL

## 2025-03-16 VITALS
RESPIRATION RATE: 14 BRPM | OXYGEN SATURATION: 99 % | TEMPERATURE: 98.4 F | HEART RATE: 90 BPM | DIASTOLIC BLOOD PRESSURE: 76 MMHG | SYSTOLIC BLOOD PRESSURE: 144 MMHG

## 2025-03-16 DIAGNOSIS — R10.9 ABDOMINAL PAIN, UNSPECIFIED ABDOMINAL LOCATION: ICD-10-CM

## 2025-03-16 DIAGNOSIS — R19.7 DIARRHEA, UNSPECIFIED TYPE: ICD-10-CM

## 2025-03-16 DIAGNOSIS — E86.0 DEHYDRATION: Primary | ICD-10-CM

## 2025-03-16 DIAGNOSIS — S09.90XA INJURY OF HEAD, INITIAL ENCOUNTER: ICD-10-CM

## 2025-03-16 DIAGNOSIS — R55 SYNCOPE AND COLLAPSE: ICD-10-CM

## 2025-03-16 DIAGNOSIS — R11.2 NAUSEA AND VOMITING, UNSPECIFIED VOMITING TYPE: ICD-10-CM

## 2025-03-16 LAB
ALBUMIN SERPL-MCNC: 4.7 G/DL (ref 3.5–5.2)
ALP SERPL-CCNC: 105 U/L (ref 40–129)
ALT SERPL-CCNC: 38 U/L (ref 0–40)
ANION GAP SERPL CALCULATED.3IONS-SCNC: 14 MMOL/L (ref 7–16)
AST SERPL-CCNC: 26 U/L (ref 0–39)
BASOPHILS # BLD: 0 K/UL (ref 0–0.2)
BASOPHILS NFR BLD: 0 % (ref 0–2)
BILIRUB SERPL-MCNC: 0.7 MG/DL (ref 0–1.2)
BILIRUB UR QL STRIP: NEGATIVE
BUN SERPL-MCNC: 22 MG/DL (ref 6–23)
CALCIUM SERPL-MCNC: 9.2 MG/DL (ref 8.6–10.2)
CHLORIDE SERPL-SCNC: 105 MMOL/L (ref 98–107)
CLARITY UR: CLEAR
CO2 SERPL-SCNC: 19 MMOL/L (ref 22–29)
COLOR UR: YELLOW
CREAT SERPL-MCNC: 1 MG/DL (ref 0.7–1.2)
EOSINOPHIL # BLD: 0 K/UL (ref 0.05–0.5)
EOSINOPHILS RELATIVE PERCENT: 0 % (ref 0–6)
EPI CELLS #/AREA URNS HPF: ABNORMAL /HPF
ERYTHROCYTE [DISTWIDTH] IN BLOOD BY AUTOMATED COUNT: 12.2 % (ref 11.5–15)
FLUAV RNA RESP QL NAA+PROBE: NOT DETECTED
FLUBV RNA RESP QL NAA+PROBE: NOT DETECTED
GFR, ESTIMATED: 83 ML/MIN/1.73M2
GLUCOSE SERPL-MCNC: 210 MG/DL (ref 74–99)
GLUCOSE UR STRIP-MCNC: >=1000 MG/DL
HCT VFR BLD AUTO: 48.4 % (ref 37–54)
HGB BLD-MCNC: 17.1 G/DL (ref 12.5–16.5)
HGB UR QL STRIP.AUTO: NEGATIVE
KETONES UR STRIP-MCNC: ABNORMAL MG/DL
LACTATE BLDV-SCNC: 2.3 MMOL/L (ref 0.5–2.2)
LEUKOCYTE ESTERASE UR QL STRIP: NEGATIVE
LIPASE SERPL-CCNC: 57 U/L (ref 13–60)
LYMPHOCYTES NFR BLD: 0.16 K/UL (ref 1.5–4)
LYMPHOCYTES RELATIVE PERCENT: 1 % (ref 20–42)
MCH RBC QN AUTO: 31.6 PG (ref 26–35)
MCHC RBC AUTO-ENTMCNC: 35.3 G/DL (ref 32–34.5)
MCV RBC AUTO: 89.5 FL (ref 80–99.9)
MONOCYTES NFR BLD: 0.49 K/UL (ref 0.1–0.95)
MONOCYTES NFR BLD: 3 % (ref 2–12)
NEUTROPHILS NFR BLD: 97 % (ref 43–80)
NEUTS SEG NFR BLD: 17.95 K/UL (ref 1.8–7.3)
NITRITE UR QL STRIP: NEGATIVE
PH UR STRIP: 6 [PH] (ref 5–8)
PLATELET # BLD AUTO: 197 K/UL (ref 130–450)
PMV BLD AUTO: 11.2 FL (ref 7–12)
POTASSIUM SERPL-SCNC: 4.9 MMOL/L (ref 3.5–5)
PROT SERPL-MCNC: 7.7 G/DL (ref 6.4–8.3)
PROT UR STRIP-MCNC: NEGATIVE MG/DL
RBC # BLD AUTO: 5.41 M/UL (ref 3.8–5.8)
RBC # BLD: NORMAL 10*6/UL
RBC #/AREA URNS HPF: ABNORMAL /HPF
SARS-COV-2 RNA RESP QL NAA+PROBE: NOT DETECTED
SODIUM SERPL-SCNC: 138 MMOL/L (ref 132–146)
SOURCE: NORMAL
SP GR UR STRIP: 1.01 (ref 1–1.03)
SPECIMEN DESCRIPTION: NORMAL
TROPONIN I SERPL HS-MCNC: 14 NG/L (ref 0–11)
TROPONIN I SERPL HS-MCNC: 17 NG/L (ref 0–11)
UROBILINOGEN UR STRIP-ACNC: 0.2 EU/DL (ref 0–1)
WBC #/AREA URNS HPF: ABNORMAL /HPF
WBC OTHER # BLD: 18.6 K/UL (ref 4.5–11.5)

## 2025-03-16 PROCEDURE — 36415 COLL VENOUS BLD VENIPUNCTURE: CPT

## 2025-03-16 PROCEDURE — 6360000004 HC RX CONTRAST MEDICATION: Performed by: RADIOLOGY

## 2025-03-16 PROCEDURE — 80053 COMPREHEN METABOLIC PANEL: CPT

## 2025-03-16 PROCEDURE — 83605 ASSAY OF LACTIC ACID: CPT

## 2025-03-16 PROCEDURE — 96374 THER/PROPH/DIAG INJ IV PUSH: CPT

## 2025-03-16 PROCEDURE — 6370000000 HC RX 637 (ALT 250 FOR IP): Performed by: EMERGENCY MEDICINE

## 2025-03-16 PROCEDURE — 2580000003 HC RX 258

## 2025-03-16 PROCEDURE — 85025 COMPLETE CBC W/AUTO DIFF WBC: CPT

## 2025-03-16 PROCEDURE — 2580000003 HC RX 258: Performed by: EMERGENCY MEDICINE

## 2025-03-16 PROCEDURE — 87636 SARSCOV2 & INF A&B AMP PRB: CPT

## 2025-03-16 PROCEDURE — 96361 HYDRATE IV INFUSION ADD-ON: CPT

## 2025-03-16 PROCEDURE — 70450 CT HEAD/BRAIN W/O DYE: CPT

## 2025-03-16 PROCEDURE — 83690 ASSAY OF LIPASE: CPT

## 2025-03-16 PROCEDURE — 6360000002 HC RX W HCPCS: Performed by: EMERGENCY MEDICINE

## 2025-03-16 PROCEDURE — 81001 URINALYSIS AUTO W/SCOPE: CPT

## 2025-03-16 PROCEDURE — 74177 CT ABD & PELVIS W/CONTRAST: CPT

## 2025-03-16 PROCEDURE — 96376 TX/PRO/DX INJ SAME DRUG ADON: CPT

## 2025-03-16 PROCEDURE — 6360000002 HC RX W HCPCS

## 2025-03-16 PROCEDURE — 99285 EMERGENCY DEPT VISIT HI MDM: CPT

## 2025-03-16 PROCEDURE — 71045 X-RAY EXAM CHEST 1 VIEW: CPT

## 2025-03-16 PROCEDURE — 84484 ASSAY OF TROPONIN QUANT: CPT

## 2025-03-16 RX ORDER — ONDANSETRON 2 MG/ML
4 INJECTION INTRAMUSCULAR; INTRAVENOUS ONCE
Status: COMPLETED | OUTPATIENT
Start: 2025-03-16 | End: 2025-03-16

## 2025-03-16 RX ORDER — IOPAMIDOL 755 MG/ML
75 INJECTION, SOLUTION INTRAVASCULAR
Status: COMPLETED | OUTPATIENT
Start: 2025-03-16 | End: 2025-03-16

## 2025-03-16 RX ORDER — ACETAMINOPHEN 325 MG/1
650 TABLET ORAL ONCE
Status: COMPLETED | OUTPATIENT
Start: 2025-03-16 | End: 2025-03-16

## 2025-03-16 RX ORDER — FAMOTIDINE 20 MG/1
20 TABLET, FILM COATED ORAL 2 TIMES DAILY
Qty: 60 TABLET | Refills: 0 | Status: SHIPPED | OUTPATIENT
Start: 2025-03-16

## 2025-03-16 RX ORDER — 0.9 % SODIUM CHLORIDE 0.9 %
1000 INTRAVENOUS SOLUTION INTRAVENOUS ONCE
Status: COMPLETED | OUTPATIENT
Start: 2025-03-16 | End: 2025-03-16

## 2025-03-16 RX ORDER — ONDANSETRON 4 MG/1
4 TABLET, ORALLY DISINTEGRATING ORAL 3 TIMES DAILY PRN
Qty: 21 TABLET | Refills: 0 | Status: SHIPPED | OUTPATIENT
Start: 2025-03-16

## 2025-03-16 RX ADMIN — IOPAMIDOL 75 ML: 755 INJECTION, SOLUTION INTRAVENOUS at 11:59

## 2025-03-16 RX ADMIN — SODIUM CHLORIDE 1000 ML: 0.9 INJECTION, SOLUTION INTRAVENOUS at 11:01

## 2025-03-16 RX ADMIN — ONDANSETRON 4 MG: 2 INJECTION INTRAMUSCULAR; INTRAVENOUS at 13:53

## 2025-03-16 RX ADMIN — ONDANSETRON 4 MG: 2 INJECTION INTRAMUSCULAR; INTRAVENOUS at 11:02

## 2025-03-16 RX ADMIN — ACETAMINOPHEN 650 MG: 325 TABLET ORAL at 12:48

## 2025-03-16 RX ADMIN — SODIUM CHLORIDE 1000 ML: 0.9 INJECTION, SOLUTION INTRAVENOUS at 13:53

## 2025-03-16 NOTE — DISCHARGE INSTRUCTIONS
Return to the emergency department if symptoms worsen or persist.  Follow-up with your PCP.  Take your medications as directed.

## 2025-03-16 NOTE — ED PROVIDER NOTES
-------------------------------------------------- RESULTS -------------------------------------------------  I have personally reviewed all laboratory and imaging results for this patient. Results are listed below.     LABS:  Results for orders placed or performed during the hospital encounter of 03/16/25   COVID-19 & Influenza Combo    Specimen: Nasopharyngeal Swab   Result Value Ref Range    Specimen Description .NASOPHARYNGEAL SWAB     Source .NASOPHARYNGEAL SWAB     SARS-CoV-2 RNA, RT PCR Not Detected Not Detected    Influenza A Not Detected Not Detected    Influenza B Not Detected Not Detected   CBC with Auto Differential   Result Value Ref Range    WBC 18.6 (H) 4.5 - 11.5 k/uL    RBC 5.41 3.80 - 5.80 m/uL    Hemoglobin 17.1 (H) 12.5 - 16.5 g/dL    Hematocrit 48.4 37.0 - 54.0 %    MCV 89.5 80.0 - 99.9 fL    MCH 31.6 26.0 - 35.0 pg    MCHC 35.3 (H) 32.0 - 34.5 g/dL    RDW 12.2 11.5 - 15.0 %    Platelets 197 130 - 450 k/uL    MPV 11.2 7.0 - 12.0 fL    Neutrophils % 97 (H) 43.0 - 80.0 %    Lymphocytes % 1 (L) 20.0 - 42.0 %    Monocytes % 3 2.0 - 12.0 %    Eosinophils % 0 0 - 6 %    Basophils % 0 0.0 - 2.0 %    Neutrophils Absolute 17.95 (H) 1.80 - 7.30 k/uL    Lymphocytes Absolute 0.16 (L) 1.50 - 4.00 k/uL    Monocytes Absolute 0.49 0.10 - 0.95 k/uL    Eosinophils Absolute 0.00 (L) 0.05 - 0.50 k/uL    Basophils Absolute 0.00 0.00 - 0.20 k/uL    RBC Morphology Normal    CMP   Result Value Ref Range    Sodium 138 132 - 146 mmol/L    Potassium 4.9 3.5 - 5.0 mmol/L    Chloride 105 98 - 107 mmol/L    CO2 19 (L) 22 - 29 mmol/L    Anion Gap 14 7 - 16 mmol/L    Glucose 210 (H) 74 - 99 mg/dL    BUN 22 6 - 23 mg/dL    Creatinine 1.0 0.70 - 1.20 mg/dL    Est, Glom Filt Rate 83 >60 mL/min/1.73m2    Calcium 9.2 8.6 - 10.2 mg/dL    Total Protein 7.7 6.4 - 8.3 g/dL    Albumin 4.7 3.5 - 5.2 g/dL    Total Bilirubin 0.7 0.0 - 1.2 mg/dL    Alkaline Phosphatase 105 40 - 129 U/L    ALT 38 0 - 40 U/L    AST 26 0 - 39 U/L

## 2025-05-23 ENCOUNTER — TELEPHONE (OUTPATIENT)
Dept: CARDIOLOGY CLINIC | Age: 65
End: 2025-05-23

## 2025-05-23 ENCOUNTER — OFFICE VISIT (OUTPATIENT)
Dept: FAMILY MEDICINE CLINIC | Age: 65
End: 2025-05-23
Payer: MEDICARE

## 2025-05-23 VITALS
OXYGEN SATURATION: 99 % | WEIGHT: 194 LBS | RESPIRATION RATE: 18 BRPM | HEART RATE: 44 BPM | SYSTOLIC BLOOD PRESSURE: 132 MMHG | BODY MASS INDEX: 29.4 KG/M2 | DIASTOLIC BLOOD PRESSURE: 80 MMHG | TEMPERATURE: 97.8 F | HEIGHT: 68 IN

## 2025-05-23 DIAGNOSIS — E11.22 TYPE 2 DIABETES MELLITUS WITH STAGE 2 CHRONIC KIDNEY DISEASE, WITHOUT LONG-TERM CURRENT USE OF INSULIN (HCC): ICD-10-CM

## 2025-05-23 DIAGNOSIS — R00.1 BRADYCARDIA: ICD-10-CM

## 2025-05-23 DIAGNOSIS — N18.2 TYPE 2 DIABETES MELLITUS WITH STAGE 2 CHRONIC KIDNEY DISEASE, WITHOUT LONG-TERM CURRENT USE OF INSULIN (HCC): ICD-10-CM

## 2025-05-23 DIAGNOSIS — M47.812 CERVICAL FACET JOINT SYNDROME: ICD-10-CM

## 2025-05-23 DIAGNOSIS — I10 ESSENTIAL HYPERTENSION: ICD-10-CM

## 2025-05-23 DIAGNOSIS — N18.2 TYPE 2 DIABETES MELLITUS WITH STAGE 2 CHRONIC KIDNEY DISEASE, WITHOUT LONG-TERM CURRENT USE OF INSULIN (HCC): Primary | ICD-10-CM

## 2025-05-23 DIAGNOSIS — M18.12 DEGENERATIVE ARTHRITIS OF THUMB, LEFT: ICD-10-CM

## 2025-05-23 DIAGNOSIS — E11.22 TYPE 2 DIABETES MELLITUS WITH STAGE 2 CHRONIC KIDNEY DISEASE, WITHOUT LONG-TERM CURRENT USE OF INSULIN (HCC): Primary | ICD-10-CM

## 2025-05-23 LAB
ALBUMIN: 4.3 G/DL (ref 3.5–5.2)
ALP BLD-CCNC: 117 U/L (ref 40–129)
ALT SERPL-CCNC: 40 U/L (ref 0–50)
ANION GAP SERPL CALCULATED.3IONS-SCNC: 13 MMOL/L (ref 7–16)
AST SERPL-CCNC: 27 U/L (ref 0–50)
BASOPHILS ABSOLUTE: 0.13 K/UL (ref 0–0.2)
BASOPHILS RELATIVE PERCENT: 2 % (ref 0–2)
BILIRUB SERPL-MCNC: 0.4 MG/DL (ref 0–1.2)
BUN BLDV-MCNC: 17 MG/DL (ref 8–23)
CALCIUM SERPL-MCNC: 9 MG/DL (ref 8.8–10.2)
CHLORIDE BLD-SCNC: 105 MMOL/L (ref 98–107)
CO2: 23 MMOL/L (ref 22–29)
CREAT SERPL-MCNC: 1.1 MG/DL (ref 0.7–1.2)
CREATININE URINE: 27 MG/DL (ref 40–278)
EOSINOPHILS ABSOLUTE: 0.28 K/UL (ref 0.05–0.5)
EOSINOPHILS RELATIVE PERCENT: 3 % (ref 0–6)
GFR, ESTIMATED: 75 ML/MIN/1.73M2
GLUCOSE BLD-MCNC: 189 MG/DL (ref 74–99)
HBA1C MFR BLD: 7.5 %
HCT VFR BLD CALC: 46.6 % (ref 37–54)
HEMOGLOBIN: 15.5 G/DL (ref 12.5–16.5)
IMMATURE GRANULOCYTES %: 1 % (ref 0–5)
IMMATURE GRANULOCYTES ABSOLUTE: 0.09 K/UL (ref 0–0.58)
LYMPHOCYTES ABSOLUTE: 1.62 K/UL (ref 1.5–4)
LYMPHOCYTES RELATIVE PERCENT: 18 % (ref 20–42)
MCH RBC QN AUTO: 30.6 PG (ref 26–35)
MCHC RBC AUTO-ENTMCNC: 33.3 G/DL (ref 32–34.5)
MCV RBC AUTO: 92.1 FL (ref 80–99.9)
MICROALBUMIN/CREAT 24H UR: <12 MG/L (ref 0–20)
MICROALBUMIN/CREAT UR-RTO: <44 MCG/MG CREAT (ref 0–30)
MONOCYTES ABSOLUTE: 0.67 K/UL (ref 0.1–0.95)
MONOCYTES RELATIVE PERCENT: 8 % (ref 2–12)
NEUTROPHILS ABSOLUTE: 6.01 K/UL (ref 1.8–7.3)
NEUTROPHILS RELATIVE PERCENT: 68 % (ref 43–80)
PDW BLD-RTO: 12.2 % (ref 11.5–15)
PLATELET # BLD: 228 K/UL (ref 130–450)
PMV BLD AUTO: 12.1 FL (ref 7–12)
POTASSIUM SERPL-SCNC: 4.9 MMOL/L (ref 3.5–5.1)
RBC # BLD: 5.06 M/UL (ref 3.8–5.8)
SODIUM BLD-SCNC: 141 MMOL/L (ref 136–145)
TOTAL PROTEIN: 7.1 G/DL (ref 6.4–8.3)
WBC # BLD: 8.8 K/UL (ref 4.5–11.5)

## 2025-05-23 PROCEDURE — G8427 DOCREV CUR MEDS BY ELIG CLIN: HCPCS | Performed by: FAMILY MEDICINE

## 2025-05-23 PROCEDURE — 3051F HG A1C>EQUAL 7.0%<8.0%: CPT | Performed by: FAMILY MEDICINE

## 2025-05-23 PROCEDURE — 3017F COLORECTAL CA SCREEN DOC REV: CPT | Performed by: FAMILY MEDICINE

## 2025-05-23 PROCEDURE — 2022F DILAT RTA XM EVC RTNOPTHY: CPT | Performed by: FAMILY MEDICINE

## 2025-05-23 PROCEDURE — G8417 CALC BMI ABV UP PARAM F/U: HCPCS | Performed by: FAMILY MEDICINE

## 2025-05-23 PROCEDURE — 1036F TOBACCO NON-USER: CPT | Performed by: FAMILY MEDICINE

## 2025-05-23 PROCEDURE — 99214 OFFICE O/P EST MOD 30 MIN: CPT | Performed by: FAMILY MEDICINE

## 2025-05-23 PROCEDURE — G2211 COMPLEX E/M VISIT ADD ON: HCPCS | Performed by: FAMILY MEDICINE

## 2025-05-23 PROCEDURE — 1123F ACP DISCUSS/DSCN MKR DOCD: CPT | Performed by: FAMILY MEDICINE

## 2025-05-23 PROCEDURE — 3075F SYST BP GE 130 - 139MM HG: CPT | Performed by: FAMILY MEDICINE

## 2025-05-23 PROCEDURE — 83036 HEMOGLOBIN GLYCOSYLATED A1C: CPT | Performed by: FAMILY MEDICINE

## 2025-05-23 PROCEDURE — 3079F DIAST BP 80-89 MM HG: CPT | Performed by: FAMILY MEDICINE

## 2025-05-23 RX ORDER — GLUCOSAMINE HCL/CHONDROITIN SU 500-400 MG
CAPSULE ORAL
Qty: 100 STRIP | Refills: 3 | Status: SHIPPED | OUTPATIENT
Start: 2025-05-23

## 2025-05-23 RX ORDER — AVOBENZONE, HOMOSALATE, OCTISALATE, OCTOCRYLENE 30; 40; 45; 26 MG/ML; MG/ML; MG/ML; MG/ML
1 CREAM TOPICAL DAILY
Qty: 100 EACH | Refills: 5 | Status: SHIPPED | OUTPATIENT
Start: 2025-05-23

## 2025-05-23 RX ORDER — METFORMIN HYDROCHLORIDE 500 MG/1
500 TABLET, EXTENDED RELEASE ORAL 2 TIMES DAILY
Qty: 180 TABLET | Refills: 5 | Status: SHIPPED | OUTPATIENT
Start: 2025-05-23

## 2025-05-23 NOTE — TELEPHONE ENCOUNTER
Patient was in today saw his PCP and she would like him to see his cardiologist his heart rate was 44.He said she wants him to cut his atenalol in half because she thinks that might be what is causing the low heart rate. Please call patient and advise Thank you

## 2025-05-25 ENCOUNTER — HOSPITAL ENCOUNTER (EMERGENCY)
Age: 65
Discharge: HOME | End: 2025-05-25
Payer: MEDICARE

## 2025-05-25 VITALS
HEART RATE: 82 BPM | RESPIRATION RATE: 18 BRPM | DIASTOLIC BLOOD PRESSURE: 78 MMHG | OXYGEN SATURATION: 97 % | SYSTOLIC BLOOD PRESSURE: 136 MMHG | TEMPERATURE: 100.2 F

## 2025-05-25 DIAGNOSIS — R05.1 ACUTE COUGH: ICD-10-CM

## 2025-05-25 DIAGNOSIS — J06.9 ACUTE UPPER RESPIRATORY INFECTION: Primary | ICD-10-CM

## 2025-05-25 PROCEDURE — 99211 OFF/OP EST MAY X REQ PHY/QHP: CPT

## 2025-05-25 RX ORDER — DOXYCYCLINE HYCLATE 100 MG
100 TABLET ORAL 2 TIMES DAILY
Qty: 20 TABLET | Refills: 0 | Status: SHIPPED | OUTPATIENT
Start: 2025-05-25 | End: 2025-06-04

## 2025-05-25 RX ORDER — ALBUTEROL SULFATE 90 UG/1
2 INHALANT RESPIRATORY (INHALATION) 4 TIMES DAILY PRN
Qty: 18 G | Refills: 0 | Status: SHIPPED | OUTPATIENT
Start: 2025-05-25

## 2025-05-25 RX ORDER — BENZONATATE 200 MG/1
200 CAPSULE ORAL 3 TIMES DAILY PRN
Qty: 30 CAPSULE | Refills: 0 | Status: SHIPPED | OUTPATIENT
Start: 2025-05-25 | End: 2025-06-04

## 2025-05-25 RX ORDER — METHYLPREDNISOLONE 4 MG/1
TABLET ORAL
Qty: 1 KIT | Refills: 0 | Status: SHIPPED | OUTPATIENT
Start: 2025-05-25 | End: 2025-05-31

## 2025-05-25 NOTE — ED PROVIDER NOTES
agreeable with the plan.    ASSESSMENT     1. Acute upper respiratory infection    2. Acute cough        DISPOSITION   Discharged home.  Patient condition is stable.    Discharge Instructions:   Patient referred to  Edilia Reddy MD  [address]  [place] [address]  [phone]    Call in 3 days  As needed, If symptoms worsen, For reevaluation    NEW MEDICATIONS     New Prescriptions    ALBUTEROL SULFATE HFA (VENTOLIN HFA) 108 (90 BASE) MCG/ACT INHALER    Inhale 2 puffs into the lungs 4 times daily as needed for Wheezing    BENZONATATE (TESSALON) 200 MG CAPSULE    Take 1 capsule by mouth 3 times daily as needed for Cough    DOXYCYCLINE HYCLATE (VIBRA-TABS) 100 MG TABLET    Take 1 tablet by mouth 2 times daily for 10 days    METHYLPREDNISOLONE (MEDROL, REGULO,) 4 MG TABLET    Take by mouth.     Electronically signed by SUZIE Bennett NP   DD: 5/25/25  **This report was transcribed using voice recognition software. Every effort was made to ensure accuracy; however, inadvertent computerized transcription errors may be present.  END OF ED PROVIDER NOTE      Benedicto Hill APRN - NP  05/25/25 [id number]

## 2025-05-27 NOTE — PROGRESS NOTES
[facility]  [address], [place] [address]  Phone: [phone]        Patient:  Rohan Nunez 65 y.o. male          Chief complaint:   Chief Complaint   Patient presents with    Follow-up    Hypertension    Diabetes     Pt states fasting glucose in the morning is high 199-204.     Pain on thumb     Pt c/o pain on left thumb.     Drainage       Assessment and Plan     Type 2 diabetes mellitus with stage 2 chronic kidney disease, without long-term current use of insulin (HCC)  -     POCT glycosylated hemoglobin (Hb A1C)  -     Lancets MISC; DAILY Starting Fri 5/23/2025, Disp-100 each, R-5, Normal  -     blood glucose monitor strips; Test 1 times a day & as needed for symptoms of irregular blood glucose., Disp-100 strip, R-3, Normal  -     metFORMIN (GLUCOPHAGE-XR) 500 MG extended release tablet; Take 1 tablet by mouth in the morning and at bedtime, Disp-180 tablet, R-5Plain metformin causes diarrhea, note increase in doseNormal  -     CBC with Auto Differential; Future  -     Comprehensive Metabolic Panel; Future  -     Albumin/Creatinine Ratio, Urine; Future       Assessment & Plan  Pre-Syncope  HTN, chronic and stable  - HR 44 bpm, concerning, did come back up into 50s in office  - Atenolol 25 mg may be excessive  - Schedule earlier cardiology appointment; inform about syncopal episodes and low HR  - Recommend home pulse oximeter; maintain pulse rate at 50 or above  - Reduce atenolol dosage by half  - Consider brain MRI if no findings with cardiology    Diabetes mellitus chronic and not controlled  - Non-adherence to medication regimen may explain elevated glucose levels  - Adjust metformin dosage to 500 mg BID  - Repeat labs to monitor improvement; consider discontinuing Jardiance if dehydration persists    Sinus drainage  - Unclear if allergies or viral infection  - Recommend nasal saline throughout the day  - Use Coricidin HBP for cough if condition worsens  - Discuss

## 2025-05-28 ENCOUNTER — RESULTS FOLLOW-UP (OUTPATIENT)
Dept: FAMILY MEDICINE CLINIC | Age: 65
End: 2025-05-28

## 2025-05-28 ENCOUNTER — OFFICE VISIT (OUTPATIENT)
Dept: FAMILY MEDICINE CLINIC | Age: 65
End: 2025-05-28

## 2025-05-28 VITALS
BODY MASS INDEX: 28.19 KG/M2 | TEMPERATURE: 97.4 F | OXYGEN SATURATION: 96 % | RESPIRATION RATE: 17 BRPM | WEIGHT: 186 LBS | SYSTOLIC BLOOD PRESSURE: 134 MMHG | DIASTOLIC BLOOD PRESSURE: 76 MMHG | HEIGHT: 68 IN | HEART RATE: 71 BPM

## 2025-05-28 DIAGNOSIS — R05.2 SUBACUTE COUGH: Primary | ICD-10-CM

## 2025-05-28 NOTE — PROGRESS NOTES
[facility]  [address], [place] [address]  Phone: [phone]        Patient:  Rohan Nunez 65 y.o. male          Chief complaint:   Chief Complaint   Patient presents with    Cough     Pt c/o lingering cough. Pt states chest hurts when he coughs.     Chest Congestion       Assessment and Plan     Subacute cough  -     XR CHEST STANDARD (2 VW); Future       Assessment & Plan  Acute upper respiratory infection  - Previously diagnosed and prescribed steroids, inhaler, Tessalon Perles, doxycycline. No improvement after 3 days, worsening symptoms at home  - No evidence of pneumonia or ear infection upon examination. Dry cough likely due to irritation  - Continue Robitussin DM, drink warm tea with honey, rest, stay hydrated  - Stat chest x-ray ordered today to rule out other conditions  - Complete doxycycline course  - Contact office if symptoms worsen      Please see Patient Instructions for further counseling and information given.        Advised to please be adherent to the treatment plans discussed today, and please call with any questions or concerns, letting the office know of any reasons that the plans may not be followed.  The risks of untreated conditions include worsening illness, injury, disability, and possibly, death. Please call if symptoms change in any way, worsen, or fail to completely resolve, as this could necessitate a change to treatment plans. Patient and/or caregiver expressed understanding.      Indications and proper use of medication(s) reviewed.  Potential side-effects and risks of medication(s) also explained.  Patient and/or caregiver was instructed to call if any new symptoms develop prior to next visit.     Health risk factors discussed and addressed.     Return to Office: No follow-ups on file.    History of Present Illness   The patient is a 65 y.o. male  who presents to the clinic for the following medical concerns:    History of Present

## 2025-05-30 ENCOUNTER — TELEPHONE (OUTPATIENT)
Dept: FAMILY MEDICINE CLINIC | Age: 65
End: 2025-05-30

## 2025-05-30 ENCOUNTER — OFFICE VISIT (OUTPATIENT)
Dept: CARDIOLOGY CLINIC | Age: 65
End: 2025-05-30
Payer: MEDICARE

## 2025-05-30 VITALS
WEIGHT: 188 LBS | BODY MASS INDEX: 29.51 KG/M2 | HEIGHT: 67 IN | DIASTOLIC BLOOD PRESSURE: 86 MMHG | RESPIRATION RATE: 18 BRPM | HEART RATE: 55 BPM | SYSTOLIC BLOOD PRESSURE: 124 MMHG

## 2025-05-30 DIAGNOSIS — I10 ESSENTIAL HYPERTENSION: Primary | ICD-10-CM

## 2025-05-30 PROCEDURE — G8417 CALC BMI ABV UP PARAM F/U: HCPCS | Performed by: INTERNAL MEDICINE

## 2025-05-30 PROCEDURE — 3079F DIAST BP 80-89 MM HG: CPT | Performed by: INTERNAL MEDICINE

## 2025-05-30 PROCEDURE — G8427 DOCREV CUR MEDS BY ELIG CLIN: HCPCS | Performed by: INTERNAL MEDICINE

## 2025-05-30 PROCEDURE — 93000 ELECTROCARDIOGRAM COMPLETE: CPT | Performed by: INTERNAL MEDICINE

## 2025-05-30 PROCEDURE — 1036F TOBACCO NON-USER: CPT | Performed by: INTERNAL MEDICINE

## 2025-05-30 PROCEDURE — 3074F SYST BP LT 130 MM HG: CPT | Performed by: INTERNAL MEDICINE

## 2025-05-30 PROCEDURE — 1123F ACP DISCUSS/DSCN MKR DOCD: CPT | Performed by: INTERNAL MEDICINE

## 2025-05-30 PROCEDURE — 99214 OFFICE O/P EST MOD 30 MIN: CPT | Performed by: INTERNAL MEDICINE

## 2025-05-30 PROCEDURE — 3017F COLORECTAL CA SCREEN DOC REV: CPT | Performed by: INTERNAL MEDICINE

## 2025-05-30 RX ORDER — ATENOLOL 25 MG/1
TABLET ORAL
Qty: 90 TABLET | Refills: 2 | Status: SHIPPED | OUTPATIENT
Start: 2025-05-30

## 2025-05-30 ASSESSMENT — ENCOUNTER SYMPTOMS
CONSTIPATION: 0
WHEEZING: 0
SHORTNESS OF BREATH: 0
VOMITING: 0
NAUSEA: 0
BLOOD IN STOOL: 0
DIARRHEA: 0
COUGH: 0
BACK PAIN: 0
ABDOMINAL PAIN: 0

## 2025-05-30 NOTE — PROGRESS NOTES
OUTPATIENT CARDIOLOGY FOLLOW-UP    HPI:    Name: Rohan Nunez    Age: 65 y.o.    Primary Care Physician: Edilia Reddy MD    Date of Service: 2025    Chief Complaint:   Chief Complaint   Patient presents with    Follow-up     Ov- pt has concerns of unregulated HR.        History of present illness :   64-year-old ex-smoker obese who comes today for follow-up visit.  He was seen in my office on 2024 for evaluation for CIDP.  Patient was followed by Dr. Li.   Patient has history of hypertension, hyperlipidemia, diabetes, coronary heart disease, cervical disc, fatty liver, benign positional vertigo and status post COVID-19 infection.  Patient had left heart catheterization on 2/15/2023 by Dr. Li and was found to have mild coronary artery disease.     Patient was active prior to upper respiratory infection 2 weeks ago.  He joined a gym and was exercising on bike and treadmill with no chest discomfort or dyspnea on exertion.  He was seen at his PCP office and was found to heart rate in the 40s and his atenolol was decreased to 12.5 mg daily.  He feels dizzy and lightheaded while sick.  He admitted to have syncope while vomiting.  He denies lower extremity edema.     his grandfather  by myocardial infarction at the age of 51.    EKG done today revealed sinus bradycardia at 55 bpm, left atrial enlargement and low voltage in frontal leads.      Review of Systems:   Review of Systems   Constitutional:  Negative for chills, fatigue and fever.   HENT:  Negative for nosebleeds.    Respiratory:  Negative for cough, shortness of breath and wheezing.         He snores at night but denies gasping for air.   Gastrointestinal:  Negative for abdominal pain, blood in stool, constipation, diarrhea, nausea and vomiting.   Genitourinary:  Negative for dysuria and hematuria.   Musculoskeletal:  Negative for back pain, joint swelling and myalgias.   Neurological:  Negative for syncope and

## 2025-08-22 DIAGNOSIS — E78.2 MIXED HYPERLIPIDEMIA: ICD-10-CM

## 2025-08-22 DIAGNOSIS — I10 ESSENTIAL HYPERTENSION: ICD-10-CM

## 2025-08-22 RX ORDER — ATORVASTATIN CALCIUM 40 MG/1
40 TABLET, FILM COATED ORAL NIGHTLY
Qty: 90 TABLET | Refills: 0 | Status: SHIPPED | OUTPATIENT
Start: 2025-08-22

## 2025-08-22 RX ORDER — LISINOPRIL 10 MG/1
10 TABLET ORAL DAILY
Qty: 90 TABLET | Refills: 0 | Status: SHIPPED | OUTPATIENT
Start: 2025-08-22

## 2025-08-27 ENCOUNTER — TELEPHONE (OUTPATIENT)
Dept: PHARMACY | Facility: CLINIC | Age: 65
End: 2025-08-27

## 2025-09-02 ENCOUNTER — TELEPHONE (OUTPATIENT)
Dept: FAMILY MEDICINE CLINIC | Age: 65
End: 2025-09-02

## 2025-09-02 DIAGNOSIS — N18.2 TYPE 2 DIABETES MELLITUS WITH STAGE 2 CHRONIC KIDNEY DISEASE, WITHOUT LONG-TERM CURRENT USE OF INSULIN (HCC): ICD-10-CM

## 2025-09-02 DIAGNOSIS — E11.22 TYPE 2 DIABETES MELLITUS WITH STAGE 2 CHRONIC KIDNEY DISEASE, WITHOUT LONG-TERM CURRENT USE OF INSULIN (HCC): ICD-10-CM

## 2025-09-02 RX ORDER — AVOBENZONE, HOMOSALATE, OCTISALATE, OCTOCRYLENE 30; 40; 45; 26 MG/ML; MG/ML; MG/ML; MG/ML
1 CREAM TOPICAL DAILY
Qty: 100 EACH | Refills: 5 | Status: SHIPPED | OUTPATIENT
Start: 2025-09-02

## 2025-09-02 RX ORDER — BLOOD-GLUCOSE METER
1 KIT MISCELLANEOUS DAILY
Qty: 1 KIT | Refills: 0 | Status: SHIPPED | OUTPATIENT
Start: 2025-09-02

## 2025-09-02 RX ORDER — GLUCOSAMINE HCL/CHONDROITIN SU 500-400 MG
CAPSULE ORAL
Qty: 100 STRIP | Refills: 5 | Status: SHIPPED | OUTPATIENT
Start: 2025-09-02

## (undated) DEVICE — CVD CANNULA

## (undated) DEVICE — 6 ML SYRINGE LUER-LOCK TIP: Brand: MONOJECT

## (undated) DEVICE — GLOVE ORANGE PI 7 1/2   MSG9075

## (undated) DEVICE — TOWEL,OR,DSP,ST,BLUE,STD,6/PK,12PK/CS: Brand: MEDLINE

## (undated) DEVICE — GAUZE,SPONGE,4"X4",12PLY,STERILE,LF,2'S: Brand: MEDLINE

## (undated) DEVICE — 3M™ RED DOT™ MONITORING ELECTRODE WITH FOAM TAPE AND STICKY GEL 2560, 50/BAG, 20/CASE, 72/PLT: Brand: RED DOT™

## (undated) DEVICE — Z DISCONTINUED APPLICATOR SURG PREP 0.35OZ 2% CHG 70% ISO ALC W/ HI LT

## (undated) DEVICE — BANDAGE ADH W1XL3IN NAT FAB WVN FLX DURABLE N ADH PD SEAL

## (undated) DEVICE — APPLICATOR MEDICATED 10.5 CC SOLUTION HI LT ORNG CHLORAPREP

## (undated) DEVICE — NEEDLE SPNL 25GA L2IN BLU SHT NEO LUM PUNC DISP

## (undated) DEVICE — NEEDLE SPNL 22GA L5IN BLK HUB S STL W/ QNCKE PNT W/OUT

## (undated) DEVICE — 3 ML SYRINGE LUER-LOCK TIP: Brand: MONOJECT

## (undated) DEVICE — NEEDLE HYPO 25GA L1.5IN BLU POLYPR HUB S STL REG BVL STR

## (undated) DEVICE — NEEDLE HYPO 18GA L1.5IN PNK POLYPR HUB S STL THN WALL FILL

## (undated) DEVICE — NEEDLE SPNL 22GA L3.5IN BLK HUB S STL REG WALL FIT STYL W/

## (undated) DEVICE — 12 ML SYRINGE,LUER-LOCK TIP: Brand: MONOJECT

## (undated) DEVICE — NDL, WHITACRE SPINAL, 22GX3.5": Brand: MEDLINE

## (undated) DEVICE — ELECTRODE SURG MPLR NEUT SELF ADH PT PLT MULTIGEN

## (undated) DEVICE — NEEDLE HYPO 27GA L1.25IN GRY POLYPR HUB S STL REG BVL STR